# Patient Record
Sex: FEMALE | Race: BLACK OR AFRICAN AMERICAN | NOT HISPANIC OR LATINO
[De-identification: names, ages, dates, MRNs, and addresses within clinical notes are randomized per-mention and may not be internally consistent; named-entity substitution may affect disease eponyms.]

---

## 2017-09-12 ENCOUNTER — RESULT REVIEW (OUTPATIENT)
Age: 57
End: 2017-09-12

## 2018-11-18 ENCOUNTER — INPATIENT (INPATIENT)
Facility: HOSPITAL | Age: 58
LOS: 3 days | Discharge: HOME | End: 2018-11-22
Attending: INTERNAL MEDICINE | Admitting: INTERNAL MEDICINE
Payer: COMMERCIAL

## 2018-11-18 VITALS
RESPIRATION RATE: 16 BRPM | DIASTOLIC BLOOD PRESSURE: 80 MMHG | HEART RATE: 70 BPM | OXYGEN SATURATION: 96 % | SYSTOLIC BLOOD PRESSURE: 154 MMHG | TEMPERATURE: 99 F

## 2018-11-18 DIAGNOSIS — I63.9 CEREBRAL INFARCTION, UNSPECIFIED: ICD-10-CM

## 2018-11-18 DIAGNOSIS — R63.8 OTHER SYMPTOMS AND SIGNS CONCERNING FOOD AND FLUID INTAKE: ICD-10-CM

## 2018-11-18 DIAGNOSIS — I10 ESSENTIAL (PRIMARY) HYPERTENSION: ICD-10-CM

## 2018-11-18 DIAGNOSIS — Z29.9 ENCOUNTER FOR PROPHYLACTIC MEASURES, UNSPECIFIED: ICD-10-CM

## 2018-11-18 DIAGNOSIS — I50.9 HEART FAILURE, UNSPECIFIED: ICD-10-CM

## 2018-11-18 DIAGNOSIS — R07.9 CHEST PAIN, UNSPECIFIED: ICD-10-CM

## 2018-11-18 DIAGNOSIS — J45.909 UNSPECIFIED ASTHMA, UNCOMPLICATED: ICD-10-CM

## 2018-11-18 LAB
ALBUMIN SERPL ELPH-MCNC: 4.1 G/DL — SIGNIFICANT CHANGE UP (ref 3.3–5)
ALP SERPL-CCNC: 84 U/L — SIGNIFICANT CHANGE UP (ref 40–120)
ALT FLD-CCNC: 15 U/L — SIGNIFICANT CHANGE UP (ref 4–33)
APTT BLD: 32.5 SEC — SIGNIFICANT CHANGE UP (ref 27.5–36.3)
AST SERPL-CCNC: 23 U/L — SIGNIFICANT CHANGE UP (ref 4–32)
BASOPHILS # BLD AUTO: 0.09 K/UL — SIGNIFICANT CHANGE UP (ref 0–0.2)
BASOPHILS NFR BLD AUTO: 1 % — SIGNIFICANT CHANGE UP (ref 0–2)
BILIRUB SERPL-MCNC: 0.6 MG/DL — SIGNIFICANT CHANGE UP (ref 0.2–1.2)
BUN SERPL-MCNC: 16 MG/DL — SIGNIFICANT CHANGE UP (ref 7–23)
CALCIUM SERPL-MCNC: 9.4 MG/DL — SIGNIFICANT CHANGE UP (ref 8.4–10.5)
CHLORIDE SERPL-SCNC: 105 MMOL/L — SIGNIFICANT CHANGE UP (ref 98–107)
CO2 SERPL-SCNC: 25 MMOL/L — SIGNIFICANT CHANGE UP (ref 22–31)
CREAT SERPL-MCNC: 0.99 MG/DL — SIGNIFICANT CHANGE UP (ref 0.5–1.3)
EOSINOPHIL # BLD AUTO: 0.15 K/UL — SIGNIFICANT CHANGE UP (ref 0–0.5)
EOSINOPHIL NFR BLD AUTO: 1.7 % — SIGNIFICANT CHANGE UP (ref 0–6)
GLUCOSE SERPL-MCNC: 106 MG/DL — HIGH (ref 70–99)
HCT VFR BLD CALC: 39.8 % — SIGNIFICANT CHANGE UP (ref 34.5–45)
HGB BLD-MCNC: 12.7 G/DL — SIGNIFICANT CHANGE UP (ref 11.5–15.5)
IMM GRANULOCYTES # BLD AUTO: 0.03 # — SIGNIFICANT CHANGE UP
IMM GRANULOCYTES NFR BLD AUTO: 0.3 % — SIGNIFICANT CHANGE UP (ref 0–1.5)
INR BLD: 1.11 — SIGNIFICANT CHANGE UP (ref 0.88–1.17)
LYMPHOCYTES # BLD AUTO: 3.83 K/UL — HIGH (ref 1–3.3)
LYMPHOCYTES # BLD AUTO: 44.4 % — HIGH (ref 13–44)
MCHC RBC-ENTMCNC: 28 PG — SIGNIFICANT CHANGE UP (ref 27–34)
MCHC RBC-ENTMCNC: 31.9 % — LOW (ref 32–36)
MCV RBC AUTO: 87.7 FL — SIGNIFICANT CHANGE UP (ref 80–100)
MONOCYTES # BLD AUTO: 0.66 K/UL — SIGNIFICANT CHANGE UP (ref 0–0.9)
MONOCYTES NFR BLD AUTO: 7.7 % — SIGNIFICANT CHANGE UP (ref 2–14)
NEUTROPHILS # BLD AUTO: 3.86 K/UL — SIGNIFICANT CHANGE UP (ref 1.8–7.4)
NEUTROPHILS NFR BLD AUTO: 44.9 % — SIGNIFICANT CHANGE UP (ref 43–77)
NRBC # FLD: 0 — SIGNIFICANT CHANGE UP
PLATELET # BLD AUTO: 291 K/UL — SIGNIFICANT CHANGE UP (ref 150–400)
PMV BLD: 9.3 FL — SIGNIFICANT CHANGE UP (ref 7–13)
POTASSIUM SERPL-MCNC: 4 MMOL/L — SIGNIFICANT CHANGE UP (ref 3.5–5.3)
POTASSIUM SERPL-SCNC: 4 MMOL/L — SIGNIFICANT CHANGE UP (ref 3.5–5.3)
PROT SERPL-MCNC: 7.3 G/DL — SIGNIFICANT CHANGE UP (ref 6–8.3)
PROTHROM AB SERPL-ACNC: 12.4 SEC — SIGNIFICANT CHANGE UP (ref 9.8–13.1)
RBC # BLD: 4.54 M/UL — SIGNIFICANT CHANGE UP (ref 3.8–5.2)
RBC # FLD: 13.2 % — SIGNIFICANT CHANGE UP (ref 10.3–14.5)
SODIUM SERPL-SCNC: 141 MMOL/L — SIGNIFICANT CHANGE UP (ref 135–145)
TROPONIN T, HIGH SENSITIVITY: < 6 NG/L — SIGNIFICANT CHANGE UP (ref ?–14)
WBC # BLD: 8.62 K/UL — SIGNIFICANT CHANGE UP (ref 3.8–10.5)
WBC # FLD AUTO: 8.62 K/UL — SIGNIFICANT CHANGE UP (ref 3.8–10.5)

## 2018-11-18 PROCEDURE — 99223 1ST HOSP IP/OBS HIGH 75: CPT | Mod: GC

## 2018-11-18 PROCEDURE — 70450 CT HEAD/BRAIN W/O DYE: CPT | Mod: 26

## 2018-11-18 PROCEDURE — 71046 X-RAY EXAM CHEST 2 VIEWS: CPT | Mod: 26

## 2018-11-18 RX ORDER — ATORVASTATIN CALCIUM 80 MG/1
80 TABLET, FILM COATED ORAL AT BEDTIME
Qty: 0 | Refills: 0 | Status: DISCONTINUED | OUTPATIENT
Start: 2018-11-18 | End: 2018-11-22

## 2018-11-18 RX ORDER — ENOXAPARIN SODIUM 100 MG/ML
40 INJECTION SUBCUTANEOUS DAILY
Qty: 0 | Refills: 0 | Status: DISCONTINUED | OUTPATIENT
Start: 2018-11-18 | End: 2018-11-22

## 2018-11-18 RX ORDER — ASPIRIN/CALCIUM CARB/MAGNESIUM 324 MG
243 TABLET ORAL DAILY
Qty: 0 | Refills: 0 | Status: DISCONTINUED | OUTPATIENT
Start: 2018-11-18 | End: 2018-11-19

## 2018-11-18 RX ADMIN — Medication 243 MILLIGRAM(S): at 20:54

## 2018-11-18 NOTE — ED PROVIDER NOTE - PROGRESS NOTE DETAILS
pt evaluated by neurology, offered pt and sister declined TPA. Will admit to medicine for CVA and further workup. AK: Admit for stroke workup per neuro. Discussed with hospiltalist. Paged Tele PA. Patient agrees with plan.

## 2018-11-18 NOTE — H&P ADULT - NSHPREVIEWOFSYSTEMS_GEN_ALL_CORE
REVIEW OF SYSTEMS:    CONSTITUTIONAL: No weakness, fevers or chills  EYES/ENT: No visual changes;  No vertigo or throat pain   NECK: No pain or stiffness  RESPIRATORY: No cough, wheezing, hemoptysis; No shortness of breath  CARDIOVASCULAR: No chest pain or palpitations  GASTROINTESTINAL: No abdominal or epigastric pain. No nausea, vomiting, or hematemesis; No diarrhea or constipation. No melena or hematochezia.  GENITOURINARY: No dysuria, frequency or hematuria  NEUROLOGICAL: No numbness or weakness  SKIN: No itching, burning, rashes, or lesions   All other review of systems is negative unless indicated above. REVIEW OF SYSTEMS:    CONSTITUTIONAL: No weakness, fevers or chills  EYES/ENT: No visual changes;  No vertigo or throat pain ; + HA  NECK: No pain or stiffness  RESPIRATORY: No cough, wheezing, hemoptysis; No shortness of breath  CARDIOVASCULAR: No chest pain or palpitations  GASTROINTESTINAL: No abdominal or epigastric pain. No nausea, vomiting, or hematemesis; No diarrhea or constipation. No melena or hematochezia.  GENITOURINARY: No dysuria, frequency or hematuria  NEUROLOGICAL: No numbness or weakness; + expressive aphasia  SKIN: No itching, burning, rashes, or lesions   All other review of systems is negative unless indicated above. REVIEW OF SYSTEMS:    CONSTITUTIONAL: No weakness, fevers or chills  EYES/ENT: No visual changes;  No vertigo or throat pain ; + HA  NECK: No pain or stiffness  RESPIRATORY: No cough, wheezing, hemoptysis; No shortness of breath  CARDIOVASCULAR:  (+) chest pain, no palpitations  GASTROINTESTINAL: No abdominal or epigastric pain. No nausea, vomiting, or hematemesis; No diarrhea or constipation. No melena or hematochezia.  GENITOURINARY: No dysuria, frequency or hematuria  NEUROLOGICAL: No numbness or weakness; stumbling over her words  SKIN: No itching, burning, rashes, or lesions   All other review of systems is negative unless indicated above.

## 2018-11-18 NOTE — ED ADULT TRIAGE NOTE - CHIEF COMPLAINT QUOTE
Pt. c/o headaches, dizziness and CP starting today. CP lasting approx. 5 min. Also states she was stumbling over her words a few times. All symptoms now resolved. PMhx: ELISA, MI

## 2018-11-18 NOTE — H&P ADULT - ASSESSMENT
58yo F hx of MI, CHF, recent pericardial effusion(observation only, f/u every 6months), known LBBB p/w CP and lightheadedness, found to have Broca's aphasia 2/2 CVA 56yo Female hx of MI, CHF, Asthma, recent pericardial effusion (observation only, f/u every 6months), known LBBB a/w CP and lightheadedness, found to have new Broca's aphasia, CVA;

## 2018-11-18 NOTE — CONSULT NOTE ADULT - SUBJECTIVE AND OBJECTIVE BOX
Neurology Consult    Reason for consult: Aphasia    HPI:  58yo F hx of MI, CHF, recent pericardial effusion(observation only, f/u 6months), known LBBB presenting with chest pain and lightheadedness starting at 12:30pm on day of admission. Neurology consulted for speech difficulty. LKW 1600. Patient was face timing with sister when her sister noticed her speech was broken. Her other sister visited her and decided to call EMS. Stroke code called, patient with loss of speech fluency and repetition deficits, intact understanding. Denies numbness/weakness/headache/dizziness.     REVIEW OF SYSTEMS:  As above    MEDICATIONS    PMH: Asthma    PSH: No significant past surgical history    FAMILY HISTORY:  No history of dementia, strokes, or seizures     SOCIAL HISTORY:  No history of tobacco or alcohol use     Allergies  No Known Allergies    Intolerances    Vital Signs Last 24 Hrs  T(C): 37.3 (18 Nov 2018 18:06), Max: 37.3 (18 Nov 2018 18:06)  T(F): 99.2 (18 Nov 2018 18:06), Max: 99.2 (18 Nov 2018 18:06)  HR: 74 (18 Nov 2018 19:51) (70 - 74)  BP: 152/66 (18 Nov 2018 19:51) (152/66 - 154/80)  BP(mean): --  RR: 16 (18 Nov 2018 19:51) (16 - 16)  SpO2: 99% (18 Nov 2018 19:51) (96% - 99%)    Neurological Examination:    Mental Status: Patient is alert, awake, oriented X3. Intact understanding, loss of fluency, loss of repetition. Follows commands well and able to answer questions appropriately. Mood and affect normal.    Cranial Nerves: PERRL, EOMI, visual field intact, V1-V3 intact, no gross facial asymmetry, tongue/uvula midline    Motor Exam: No drift  Right upper extremity: 5/5  Left upper extremity: 5/5  Right lower extremity: 5/5  Left lower extremity: 5/5    Normal bulk/tone    Sensory: Intact to light touch and pinprick bilaterally. No extinction    Coordination: Finger to nose intact bilaterally     Gait: Normal      Reflexes: Bilateral 2+ Biceps, Brachial, Patellar, Ankle  Plantar: Down bilateral    GENERAL: No acute distress  HEENT:  Normocephalic, atraumatic  EXTREMITIES: No edema, clubbing, cyanosis  MUSCULOSKELETAL: Normal range of motion  SKIN: No rashes    LABS:  CBC Full  -  ( 18 Nov 2018 19:17 )  WBC Count : 8.62 K/uL  Hemoglobin : 12.7 g/dL  Hematocrit : 39.8 %  Platelet Count - Automated : 291 K/uL

## 2018-11-18 NOTE — ED PROVIDER NOTE - ATTENDING CONTRIBUTION TO CARE
Attending Statement: I have personally seen and examined this patient. I have fully participated in the care of this patient. I have reviewed all pertinent clinical information, including history physical exam, plan and the Resident's note and agree except as noted  57yoF hx of CAD, HTN, asthma, CHF, pw chest pain and difficulty speaking/finding words. States that earlier today she went to [employer] had 5 minute non exertional, non radiating midsternal chest pain. No associated sob/nausea or diaphoresis. Was alone, facetime sister at approximately 415pm-430pm noted that she was slurring her words and was having difficulty speaking. Her sister then contacted her family and they came to the hospital. Pt endorsing difficulty  "getting words out and speaking" Sister states "speech is off, slurred off.  No focal weakness no numbness, "little headache" no change in vision. no chest pain now. no sob. no trauma.   Vital signs noted. tearful, upset, anxious with sister at bedside. EOMI. PERRLA. no facial asymmetry. normal S1-S2 No resp distress. able to speak in full and clear sentences. no wheeze, rales or stridor. soft nontender abdomen. no  rebound. no guarding. no sign of trauma. no CVAT AOx3, no focal deficits. CN 2-12 grossly intact. nl sensation face/arms . no meningeal signs.   plan code stroke called, ekg, cxr, tele monitoring, neurology and admit

## 2018-11-18 NOTE — H&P ADULT - PROBLEM/PLAN-7
[phone] (home)       Called Josue to set her up for a [employer] account. Left message for pt to return call.       Franny Jara      Patient Care Team:  Felipe Mcfarland MD as PCP - General (Family Medicine)  Felipe Mcfarland MD (Family Medicine)
DISPLAY PLAN FREE TEXT

## 2018-11-18 NOTE — H&P ADULT - HISTORY OF PRESENT ILLNESS
58yo F hx of MI, CHF, recent pericardial effusion(observation only, f/u every 6months), known LBBB presenting with chest pain and lightheadedness starting at 12:30pm on day of admission.     In the ED, Neurology was consulted for sudden change in speech. LKW 1600. Patient was face timing with sister when her sister noticed her speech was broken. Her other sister visited her and decided to call EMS.     In the ED, Code Stroke code, patient with loss of speech fluency and repetition deficits, yet retained understanding.     Denies numbness/weakness/headache/dizziness.     In the ED:  Vital Signs Last 24 Hrs  T(C): 37.3 (11-18-18 @ 18:06), Max: 37.3 (11-18-18 @ 18:06)  T(F): 99.2 (11-18-18 @ 18:06), Max: 99.2 (11-18-18 @ 18:06)  HR: 87 (11-18-18 @ 20:54) (70 - 87)  BP: 135/75 (11-18-18 @ 20:54) (135/75 - 154/80)  RR: 16 (11-18-18 @ 20:54) (16 - 16)  SpO2: 100% (11-18-18 @ 20:54) (96% - 100%)    Received: Aspirin 234 58yo F hx of MI, CHF, recent pericardial effusion(observation only, f/u every 6months), known LBBB presenting with chest pain and lightheadedness starting at 12:30pm on day of admission. During discussion with pt, no complaints of CP/SOB; pt also denying recent weight gain, LE edema, or other cardiac-related sx.    Per pt, stated that she was in her [place] when she began to have some chest tightness around 12:30, for which she took a baby aspirin and felt belt.  She was in [employer] today and when she came home, she had a headache around 4 PM, which she also had during the interview; HA is in L occipito-parietal region, 6-7/10 w/o radiation, mild light sensitivity but denies n/v/phonophobia; pt does not have a hx of HA.  Pt was then face-timing with her sister when she began having difficulty getting words out; her sister was very concerned given her cardiac hx and suggested she come into the ED.      In the ED, Neurology was consulted for sudden change in speech. Last known well 1600. . In the ED, Code Stroke code, patient with loss of speech fluency and repetition deficits, yet retained understanding. Pt refused tPA after risk/benefit discussion.      Per discussion with pt, she has no toxic habits and only drinks socially; no cigarettes ever smoked or any hx of illicit drugs.  Pt denies any pertinent family hx of diabetes, stroke, or cardiac disease.  Pt is quite socially active attending concerts, plays and other social functions and works full-time as a manager in a senior citizens facility.      Denies numbness/weakness/headache/dizziness; no complaints of f/c/n/v/CP/SOB/LOC/abd pain/dysuria.      In the ED:  Vital Signs Last 24 Hrs  T(C): 37.3 (11-18-18 @ 18:06), Max: 37.3 (11-18-18 @ 18:06)  T(F): 99.2 (11-18-18 @ 18:06), Max: 99.2 (11-18-18 @ 18:06)  HR: 87 (11-18-18 @ 20:54) (70 - 87)  BP: 135/75 (11-18-18 @ 20:54) (135/75 - 154/80)  RR: 16 (11-18-18 @ 20:54) (16 - 16)  SpO2: 100% (11-18-18 @ 20:54) (96% - 100%)    Received: Aspirin 243 56yo F hx of MI, CHF, recent pericardial effusion(observation only, f/u every 6months), known LBBB presenting with chest pain and lightheadedness starting at 12:30pm on day of admission who later developed expressive aphasia. During discussion with pt, no complaints of CP/SOB; pt also denying recent weight gain, LE edema, or other cardiac-related sx.    Per pt, stated that she was in her [place] when she began to have some chest tightness around 12:30, for which she took a baby aspirin and felt belt.  She was in [employer] today and when she came home, she had a headache around 4 PM, which she also had during the interview; HA is in L occipito-parietal region, 6-7/10 w/o radiation, mild light sensitivity but denies n/v/phonophobia; pt does not have a hx of HA.  Pt was then face-timing with her sister when she began having difficulty getting words out; her sister was very concerned given her cardiac hx and suggested she come into the ED.      In the ED, Neurology was consulted for sudden change in speech. Last known well 1600. . In the ED, Code Stroke code, patient with loss of speech fluency and repetition deficits, yet retained understanding. Pt refused tPA after risk/benefit discussion.      Per discussion with pt, she has no toxic habits and only drinks socially; no cigarettes ever smoked or any hx of illicit drugs.  Pt denies any pertinent family hx of diabetes, stroke, or cardiac disease.  Pt is quite socially active attending concerts, plays and other social functions and works full-time as a manager in a senior citizens facility.      Denies numbness/weakness/headache/dizziness; no complaints of f/c/n/v/CP/SOB/LOC/abd pain/dysuria.      In the ED:  Vital Signs Last 24 Hrs  T(C): 37.3 (11-18-18 @ 18:06), Max: 37.3 (11-18-18 @ 18:06)  T(F): 99.2 (11-18-18 @ 18:06), Max: 99.2 (11-18-18 @ 18:06)  HR: 87 (11-18-18 @ 20:54) (70 - 87)  BP: 135/75 (11-18-18 @ 20:54) (135/75 - 154/80)  RR: 16 (11-18-18 @ 20:54) (16 - 16)  SpO2: 100% (11-18-18 @ 20:54) (96% - 100%)    Received: Aspirin 243 56yo Female hx of MI, CHF, Asthma, recent pericardial effusion (observation only, f/u every 6months), known LBBB presenting with chest pain and lightheadedness starting at 12:30pm on day of admission who later developed expressive aphasia. During discussion with pt, no complaints of CP/SOB; pt also denying recent weight gain, LE edema, or other cardiac-related sx. Per pt, stated that she was in her [employer] when she began to have some chest tightness around 12:30, for which she took a baby aspirin and felt belt.  She was in [employer] today and when she came home, she had a headache around 4 PM, which she also had during the interview; HA is in L occipito-parietal region, 6-7/10 w/o radiation, mild light sensitivity but denies n/v/phonophobia; pt does not have a hx of HA.  Pt was then face-timing with her sister when she began having difficulty getting words out; her sister was very concerned given her cardiac hx and suggested she come into the ED.  In the ED, Neurology was consulted for sudden change in speech. Last known well 1600. . In the ED, Code Stroke code, patient with loss of speech fluency and repetition deficits, yet retained understanding. Pt refused tPA after risk/benefit discussion.    Per discussion with pt, she has no toxic habits and only drinks socially; no cigarettes ever smoked or any hx of illicit drugs. Pt denies any pertinent family hx of diabetes, stroke, or cardiac disease or CAD.  Pt is quite socially active attending concerts, plays and other social functions and works full-time as a manager in a senior citizens facility.  Denies numbness/weakness/headache/dizziness; no complaints of f/c/n/v/CP/SOB/LOC/abd pain/dysuria.  Of note, sates that takes Aspirin occasionally only.      In the ED:  Vital Signs Last 24 Hrs  T(C): 37.3 (11-18-18 @ 18:06), Max: 37.3 (11-18-18 @ 18:06)  T(F): 99.2 (11-18-18 @ 18:06), Max: 99.2 (11-18-18 @ 18:06)  HR: 87 (11-18-18 @ 20:54) (70 - 87)  BP: 135/75 (11-18-18 @ 20:54) (135/75 - 154/80)  RR: 16 (11-18-18 @ 20:54) (16 - 16)  SpO2: 100% (11-18-18 @ 20:54) (96% - 100%)    Received: Aspirin 243mg x 1 dose

## 2018-11-18 NOTE — H&P ADULT - NSHPPHYSICALEXAM_GEN_ALL_CORE
Vital Signs Last 24 Hrs  T(C): 37.3 (11-18-18 @ 18:06), Max: 37.3 (11-18-18 @ 18:06)  T(F): 99.2 (11-18-18 @ 18:06), Max: 99.2 (11-18-18 @ 18:06)  HR: 87 (11-18-18 @ 20:54) (70 - 87)  BP: 135/75 (11-18-18 @ 20:54) (135/75 - 154/80)  BP(mean): --  RR: 16 (11-18-18 @ 20:54) (16 - 16)  SpO2: 100% (11-18-18 @ 20:54) (96% - 100%)    PHYSICAL EXAM:  GENERAL: NAD, well-groomed, well-developed  HEAD:  Atraumatic, Normocephalic  EYES: EOMI, PERRLA, conjunctiva and sclera clear  ENMT: No tonsillar erythema, exudates, or enlargement; Moist mucous membranes, Good dentition, No lesions  NECK: Supple, No JVD, Normal thyroid  CHEST/LUNG: Clear to percussion bilaterally; No rales, rhonchi, wheezing, or rubs  HEART: Regular rate and rhythm; No murmurs, rubs, or gallops  ABDOMEN: Soft, Nontender, Nondistended; Bowel sounds present  EXTREMITIES:  2+ Peripheral Pulses, No clubbing, cyanosis, or edema  LYMPH: No lymphadenopathy noted  SKIN: No rashes or lesions  NERVOUS SYSTEM:  Alert & Oriented X3, Good concentration; Motor Strength 5/5 B/L upper and lower extremities; DTRs 2+ intact and symmetric Vital Signs Last 24 Hrs  T(C): 37.3 (11-18-18 @ 18:06), Max: 37.3 (11-18-18 @ 18:06)  T(F): 99.2 (11-18-18 @ 18:06), Max: 99.2 (11-18-18 @ 18:06)  HR: 87 (11-18-18 @ 20:54) (70 - 87)  BP: 135/75 (11-18-18 @ 20:54) (135/75 - 154/80)  RR: 16 (11-18-18 @ 20:54) (16 - 16)  SpO2: 100% (11-18-18 @ 20:54) (96% - 100%)    PHYSICAL EXAM:  GENERAL: NAD, conversant, pleasant  HEAD:  Atraumatic, Normocephalic  EYES: EOMI, PERRLA, conjunctiva and sclera clear  ENMT: mmm, tongue midline w/o fasciculation  NECK: Supple, No JVD, Normal thyroid  CHEST/LUNG: Clear to percussion bilaterally; No rales, rhonchi, wheezing, or rubs  HEART: Regular rate and rhythm; No murmurs, rubs, or gallops  ABDOMEN: Soft, Nontender, Nondistended; Bowel sounds present  EXTREMITIES:  2+ Peripheral Pulses, No clubbing, cyanosis, or edema  LYMPH: No lymphadenopathy noted  SKIN: No rashes or lesions  NERVOUS SYSTEM:  Alert & Oriented X3, Good concentration; Motor Strength 5/5 B/L upper and lower extremities; DTRs 2+ intact and symmetric; no dysmetria, sensation in tact throughout , expressive aphasia Vital Signs Last 24 Hrs  T(C): 37.3 (11-18-18 @ 18:06), Max: 37.3 (11-18-18 @ 18:06)  T(F): 99.2 (11-18-18 @ 18:06), Max: 99.2 (11-18-18 @ 18:06)  HR: 87 (11-18-18 @ 20:54) (70 - 87)  BP: 135/75 (11-18-18 @ 20:54) (135/75 - 154/80)  RR: 16 (11-18-18 @ 20:54) (16 - 16)  SpO2: 100% (11-18-18 @ 20:54) (96% - 100%)    PHYSICAL EXAM:  GENERAL: NAD, conversant, pleasant  HEAD:  Atraumatic, Normocephalic  EYES: EOMI, PERRLA, conjunctiva and sclera clear  ENMT: mmm, tongue midline w/o fasciculation  NECK: Supple, No JVD, Normal thyroid  CHEST/LUNG: Clear to percussion bilaterally; No rales, rhonchi, wheezing, or rubs  HEART: Regular rate and rhythm; No murmurs, rubs, or gallops  ABDOMEN: Soft, Nontender, Nondistended; Bowel sounds present  EXTREMITIES:  2+ Peripheral Pulses, No clubbing, cyanosis, or edema  LYMPH: No lymphadenopathy noted  SKIN: No rashes or lesions  NERVOUS SYSTEM:  Alert & Oriented X3, Good concentration; Motor Strength 5/5 B/L upper and lower extremities; DTRs 2+ intact and symmetric; no dysmetria, sensation in tact throughout , periodic expressive aphasia, cognition and comprehension in tact Vital Signs Last 24 Hrs  T(C): 37.3 (11-18-18 @ 18:06), Max: 37.3 (11-18-18 @ 18:06)  T(F): 99.2 (11-18-18 @ 18:06), Max: 99.2 (11-18-18 @ 18:06)  HR: 87 (11-18-18 @ 20:54) (70 - 87)  BP: 135/75 (11-18-18 @ 20:54) (135/75 - 154/80)  RR: 16 (11-18-18 @ 20:54) (16 - 16)  SpO2: 100% (11-18-18 @ 20:54) (96% - 100%)    PHYSICAL EXAM:  GENERAL: NAD, conversant, pleasant  EYES: EOMI, PERRLA, conjunctiva and sclera clear  ENMT: mmm, tongue midline w/o fasciculation  NECK: Supple, No JVD, Normal thyroid  CHEST/LUNG: Clear to percussion bilaterally; No rales, rhonchi, wheezing, or rubs  HEART: Regular rate and rhythm; No murmurs, rubs, or gallops  ABDOMEN: Soft, Nontender, Nondistended; Bowel sounds present  EXTREMITIES:  2+ Peripheral Pulses, No clubbing, cyanosis, or edema  LYMPH: No lymphadenopathy noted  SKIN: No rashes or lesions  NERVOUS SYSTEM:  Alert & Oriented X3, Good concentration; Motor Strength 5/5 B/L upper and lower extremities; DTRs 2+ intact and symmetric; no dysmetria, sensation in tact throughout , notable expressive aphasia, cognition and comprehension in tact

## 2018-11-18 NOTE — H&P ADULT - PROBLEM SELECTOR PLAN 4
- DVT:  - Dispo: tele  - Consult: neurology, PT/OT/PMR, speech/swallow  - Code: full Pt does not endorse a hx of HTN but pressures are elevated  - monitor BPs; permissive HTN w/ SBP up to 220  - will give IV Labetalol if pressure approaches 200  - q4 VS + neurocheck

## 2018-11-18 NOTE — H&P ADULT - PROBLEM SELECTOR PLAN 1
Pt has significant cardiac hx w/ MI, CHF, and LBBB w/ pericardial effusion that is followed  - f/u MRI brain  - f/u MRA head neck  - f/u carotid duplex  - f/u TTE w/ Bubble study  - monitor on telemetry  - PT/OT/PMR c/s; speech and swallow evaluation and speech language eval given Broca's aphasia  - Aspirin 234   - Atorvastatin 80  - f/u A1c and lipid profile in the AM Pt has expressive aphasia likely 2/2 ischemic stroke as no hemorrhage on CTH; Pt has significant cardiac hx w/ MI, CHF, and LBBB w/ pericardial effusion that is followed; no family hx of disease and no irregular HR on exam  - f/u MRI brain  - f/u MRA head neck  - f/u carotid duplex  - f/u TTE w/ Bubble study  - permissive HTN for 24 hours w/ SBP up to 220  - f/u neuro recs  - vitals q4 + q4 neurochecks  - monitor on telemetry  - PT/OT/PMR c/s; speech and swallow evaluation and speech language eval given Broca's aphasia  - c/w ASA 81   - Atorvastatin 80  - f/u A1c and lipid profile in the AM

## 2018-11-18 NOTE — H&P ADULT - PROBLEM SELECTOR PLAN 7
- DVT:  - Dispo: tele  - Consult: neurology, PT/OT/PMR, speech/swallow  - Code: full - DVT: Lovenox  - Dispo: tele  - Consult: neurology, PT/OT/PMR, speech/swallow  - Code: full

## 2018-11-18 NOTE — H&P ADULT - NSHPSOCIALHISTORY_GEN_ALL_CORE
Social History:    Marital Status:  (   )    (   ) Single    (   )    (  )   Occupation:   Lives with: (  ) alone  (  ) children   (  ) spouse   (  ) parents  (  ) other    Substance Use (street drugs): (  ) never used  (  ) other:  Tobacco Usage:  (   ) never smoked   (   ) former smoker   (   ) current smoker  (     ) pack year  (        ) last cigarette date  Alcohol Usage:  Sexual History: Occupation: manager  Substance Use (street drugs): ( x ) never used  (  ) other:  Tobacco Usage:  ( x  ) never smoked   (   ) former smoker   (   ) current smoker  (     ) pack year  (        ) last cigarette date  Alcohol Usage: socially Occupation:   Substance Use (street drugs): ( x ) never used  (  ) other:  Tobacco Usage:  ( x  ) never smoked   (   ) former smoker   (   ) current smoker  (     ) pack year  (        ) last cigarette date  Alcohol Usage: socially

## 2018-11-18 NOTE — ED PROVIDER NOTE - OBJECTIVE STATEMENT
58yo F hx of MI, CHF, recent pericardial effusion(observation only, f/u 6months), known LBBB presenting 58yo F hx of MI, CHF, recent pericardial effusion(observation only, f/u 6months), known LBBB presenting with chest pain and lightheadedness starting at 12:30pm today. Chest pain lasted 5 minutes, resolved, no pain since. Lightheadedness slowly improving over the course of the day. At 4pm/4:30pm patient was face-timing her sister and she was told she was slurring her words and repeating some words. Her other sister came over, they called EMS and EMS also noted word slurring and repetition. Denies any numbness/weakness.   She took 81mg asa earlier today.

## 2018-11-18 NOTE — H&P ADULT - PROBLEM SELECTOR PLAN 2
- monitor O2 sats  - can give PRN Duoneb as needed Pt earlier complained of chest tightness and lightheadedness, which resolved on exam; no relation to exertion, no SLG NTG used; does not meet criteria for typical or atypical CP  - monitor on telemetry  - ASA 81   - f/u TTE w/ Bubble for EF and structural abnormalities  - no need for urgent cardiology c/s at this time Pt is high risk, earlier complained of chest tightness and lightheadedness, which resolved on exam; no relation to exertion, no SLG NTG used; does not meet criteria for typical  CP;  - monitor on telemetry  - ASA 81   - f/u TTE w/ Bubble for EF and structural abnormalities  - Cardiology c/s in am

## 2018-11-18 NOTE — ED PROVIDER NOTE - MEDICAL DECISION MAKING DETAILS
58yo F hx of MI, CHF, recent pericardial effusion(observation only, f/u 6months), known LBBB presenting with chest pain and lightheadedness starting at 12:30pm today and noted by family member to have slurred speech at 4 or 4:30pm. ACS workup. Code stroke called after patient noted word slurring. Neuro in the room.

## 2018-11-18 NOTE — H&P ADULT - NSHPLABSRESULTS_GEN_ALL_CORE
11-18    141  |  105  |  16  ----------------------------<  106<H>  4.0   |  25  |  0.99    Ca    9.4      18 Nov 2018 19:17    TPro  7.3  /  Alb  4.1  /  TBili  0.6  /  DBili  x   /  AST  23  /  ALT  15  /  AlkPhos  84  11-18      PT/INR - ( 18 Nov 2018 19:17 )   PT: 12.4 SEC;   INR: 1.11          PTT - ( 18 Nov 2018 19:17 )  PTT:32.5 SEC                                        12.7   8.62  )-----------( 291      ( 18 Nov 2018 19:17 )             39.8     CAPILLARY BLOOD GLUCOSE EKG, 11/18, 71bpm, qtc 423, LBBB, no acute Tw or ST changes - my reading         CT BRAIN STROKE PROTOCOL   PROCEDURE DATE: Nov 18 2018   FINDINGS:   There is no acute intracranial hemorrhage, mass effect, midline shift,   extra-axial collection, hydrocephalus, or evidence of acute vascular   territorial infarction.   The visualized paranasal sinuses and mastoid air cells are clear. Visualized   osseous structures are intact.   IMPRESSION:   No acute intracranial hemorrhage, mass effect, or evidence of acute vascular   territorial infarction.    CXR - clear lungs, no pleural effusions - my reading

## 2018-11-18 NOTE — H&P ADULT - PROBLEM SELECTOR PLAN 6
- NPO until formal speech/swallow evaluation  - will give very gentle IV hydration @ 50 cc/hr w/ D5 since pt is NPO except meds; pt should be on at least a DASH-TLC diet;

## 2018-11-18 NOTE — H&P ADULT - PROBLEM SELECTOR PLAN 3
- NPO until formal speech/swallow evaluation Unclear if systolic or diastolic; likely diastolic  - daily weight, strict I/O  - will hold home lisinopril and metoprolol succinate at this time Unclear if systolic or diastolic; likely diastolic  - daily weight, strict I/O  - will hold home lisinopril and metoprolol succinate at this time  - f/u TTE

## 2018-11-18 NOTE — CONSULT NOTE ADULT - ATTENDING COMMENTS
I have seen and examined this patient with the stroke neurology team.     History was reviewed with the patient and/or available family members.   ROS: All negative except documented in the HPI.   Neurological exam was performed and agree with exam as documented above.   Laboratory results and imaging studies were reviewed by me.   I agree with the neurology resident note as documented above.    57 years old woman with multiple vascular risk factors including age, CAD and CHF is evaluated at [facility] for language disturbance described as expressive aphasia, noticed on 11/18; prompting her presentation to the hospital. Today she reports complete resolution of her neurological symptoms and reports being back to normal. Neurological examination today is grossly nonfocal. MRI brain did not show any evidence of acute infarct. MRA head is grossly unremarkable. MRA neck was severely limited by motion artifact.    Impression:  Cerebral embolism without cerebral infarction. Transient ischemic attack involving left MCA distribution presenting as transient left hemispheric dysfunction - likely etiology being embolism from a proximal source like cardiac/paradoxical source of embolism, but possibility of large vessel disease (extracranial) needs to be ruled out     Plan:  - Aspirin and clopidogrel for aggressive secondary stroke prevention for 3 weeks followed by aspirin 81 mg once a day (clopidogrel to be started once able to pass swallowing evaluation.)  - Agree to continue with pharmacological DVT prophylaxis   - Continue with home statin medication if applicable considering likely non-atheroembolic etiology of her TIA or to be considered based on medical indications/ASCVD risk profile  - HbA1C 5.4 and LDL 57, continue with aggressive vascular risk factors modifications   - Permissive HTN up to 220/110 mmHg for first 48-72 hours from symptom onset followed by gradual normotension  - CTA head and neck to better characterize extracranial cerebral vasculature and MRA was severely limited by motion artifact  - MARIVEL with bubble study and continue with telemetry to screen for possible cardiac source of embolism  - Prolonged cardiac monitoring with ICM to screen for occult cardiac arrhythmia like atrial fibrillation being the cause of possible cardiac source of embolism to be considered based on results of above mentioned cardiac tests    - PT/OT/Speech and swallow/safety eval  - Stroke education    Above mentioned plan was discussed with patient and available family members at bedside in detail. All the questions were answered and concerns were addressed.

## 2018-11-18 NOTE — H&P ADULT - PMH
Asthma Asthma    CHF (congestive heart failure)    LBBB (left bundle branch block)    Myocardial infarction, unspecified MI type, unspecified artery

## 2018-11-19 DIAGNOSIS — I50.9 HEART FAILURE, UNSPECIFIED: ICD-10-CM

## 2018-11-19 DIAGNOSIS — J45.40 MODERATE PERSISTENT ASTHMA, UNCOMPLICATED: ICD-10-CM

## 2018-11-19 DIAGNOSIS — I63.9 CEREBRAL INFARCTION, UNSPECIFIED: ICD-10-CM

## 2018-11-19 LAB
ALBUMIN SERPL ELPH-MCNC: 3.7 G/DL — SIGNIFICANT CHANGE UP (ref 3.3–5)
ALP SERPL-CCNC: 74 U/L — SIGNIFICANT CHANGE UP (ref 40–120)
ALT FLD-CCNC: 13 U/L — SIGNIFICANT CHANGE UP (ref 4–33)
APTT BLD: 28.8 SEC — SIGNIFICANT CHANGE UP (ref 27.5–36.3)
AST SERPL-CCNC: 20 U/L — SIGNIFICANT CHANGE UP (ref 4–32)
BASOPHILS # BLD AUTO: 0.08 K/UL — SIGNIFICANT CHANGE UP (ref 0–0.2)
BASOPHILS NFR BLD AUTO: 1.3 % — SIGNIFICANT CHANGE UP (ref 0–2)
BILIRUB SERPL-MCNC: 0.8 MG/DL — SIGNIFICANT CHANGE UP (ref 0.2–1.2)
BUN SERPL-MCNC: 10 MG/DL — SIGNIFICANT CHANGE UP (ref 7–23)
CALCIUM SERPL-MCNC: 8.8 MG/DL — SIGNIFICANT CHANGE UP (ref 8.4–10.5)
CHLORIDE SERPL-SCNC: 107 MMOL/L — SIGNIFICANT CHANGE UP (ref 98–107)
CHOLEST SERPL-MCNC: 133 MG/DL — SIGNIFICANT CHANGE UP (ref 120–199)
CK SERPL-CCNC: 223 U/L — HIGH (ref 25–170)
CK SERPL-CCNC: 247 U/L — HIGH (ref 25–170)
CO2 SERPL-SCNC: 21 MMOL/L — LOW (ref 22–31)
CREAT SERPL-MCNC: 0.76 MG/DL — SIGNIFICANT CHANGE UP (ref 0.5–1.3)
EOSINOPHIL # BLD AUTO: 0.13 K/UL — SIGNIFICANT CHANGE UP (ref 0–0.5)
EOSINOPHIL NFR BLD AUTO: 2.1 % — SIGNIFICANT CHANGE UP (ref 0–6)
GLUCOSE SERPL-MCNC: 113 MG/DL — HIGH (ref 70–99)
HBA1C BLD-MCNC: 5.4 % — SIGNIFICANT CHANGE UP (ref 4–5.6)
HCT VFR BLD CALC: 38 % — SIGNIFICANT CHANGE UP (ref 34.5–45)
HDLC SERPL-MCNC: 75 MG/DL — HIGH (ref 45–65)
HGB BLD-MCNC: 12.5 G/DL — SIGNIFICANT CHANGE UP (ref 11.5–15.5)
IMM GRANULOCYTES # BLD AUTO: 0.01 # — SIGNIFICANT CHANGE UP
IMM GRANULOCYTES NFR BLD AUTO: 0.2 % — SIGNIFICANT CHANGE UP (ref 0–1.5)
INR BLD: 1.12 — SIGNIFICANT CHANGE UP (ref 0.88–1.17)
LIPID PNL WITH DIRECT LDL SERPL: 57 MG/DL — SIGNIFICANT CHANGE UP
LYMPHOCYTES # BLD AUTO: 2.94 K/UL — SIGNIFICANT CHANGE UP (ref 1–3.3)
LYMPHOCYTES # BLD AUTO: 47.3 % — HIGH (ref 13–44)
MAGNESIUM SERPL-MCNC: 2.1 MG/DL — SIGNIFICANT CHANGE UP (ref 1.6–2.6)
MCHC RBC-ENTMCNC: 28.7 PG — SIGNIFICANT CHANGE UP (ref 27–34)
MCHC RBC-ENTMCNC: 32.9 % — SIGNIFICANT CHANGE UP (ref 32–36)
MCV RBC AUTO: 87.4 FL — SIGNIFICANT CHANGE UP (ref 80–100)
MONOCYTES # BLD AUTO: 0.51 K/UL — SIGNIFICANT CHANGE UP (ref 0–0.9)
MONOCYTES NFR BLD AUTO: 8.2 % — SIGNIFICANT CHANGE UP (ref 2–14)
NEUTROPHILS # BLD AUTO: 2.55 K/UL — SIGNIFICANT CHANGE UP (ref 1.8–7.4)
NEUTROPHILS NFR BLD AUTO: 40.9 % — LOW (ref 43–77)
NRBC # FLD: 0 — SIGNIFICANT CHANGE UP
PHOSPHATE SERPL-MCNC: 3.1 MG/DL — SIGNIFICANT CHANGE UP (ref 2.5–4.5)
PLATELET # BLD AUTO: 261 K/UL — SIGNIFICANT CHANGE UP (ref 150–400)
PMV BLD: 9.2 FL — SIGNIFICANT CHANGE UP (ref 7–13)
POTASSIUM SERPL-MCNC: 3.6 MMOL/L — SIGNIFICANT CHANGE UP (ref 3.5–5.3)
POTASSIUM SERPL-SCNC: 3.6 MMOL/L — SIGNIFICANT CHANGE UP (ref 3.5–5.3)
PROT SERPL-MCNC: 6.3 G/DL — SIGNIFICANT CHANGE UP (ref 6–8.3)
PROTHROM AB SERPL-ACNC: 12.8 SEC — SIGNIFICANT CHANGE UP (ref 9.8–13.1)
RBC # BLD: 4.35 M/UL — SIGNIFICANT CHANGE UP (ref 3.8–5.2)
RBC # FLD: 13.2 % — SIGNIFICANT CHANGE UP (ref 10.3–14.5)
SODIUM SERPL-SCNC: 140 MMOL/L — SIGNIFICANT CHANGE UP (ref 135–145)
TRIGL SERPL-MCNC: 39 MG/DL — SIGNIFICANT CHANGE UP (ref 10–149)
TROPONIN T, HIGH SENSITIVITY: < 6 NG/L — SIGNIFICANT CHANGE UP (ref ?–14)
TROPONIN T, HIGH SENSITIVITY: < 6 NG/L — SIGNIFICANT CHANGE UP (ref ?–14)
TSH SERPL-MCNC: 1.61 UIU/ML — SIGNIFICANT CHANGE UP (ref 0.27–4.2)
WBC # BLD: 6.22 K/UL — SIGNIFICANT CHANGE UP (ref 3.8–10.5)
WBC # FLD AUTO: 6.22 K/UL — SIGNIFICANT CHANGE UP (ref 3.8–10.5)

## 2018-11-19 PROCEDURE — 70547 MR ANGIOGRAPHY NECK W/O DYE: CPT | Mod: 26

## 2018-11-19 PROCEDURE — 99233 SBSQ HOSP IP/OBS HIGH 50: CPT

## 2018-11-19 PROCEDURE — 99223 1ST HOSP IP/OBS HIGH 75: CPT | Mod: GC

## 2018-11-19 PROCEDURE — 70551 MRI BRAIN STEM W/O DYE: CPT | Mod: 26

## 2018-11-19 RX ORDER — ACETAMINOPHEN 500 MG
650 TABLET ORAL EVERY 6 HOURS
Qty: 0 | Refills: 0 | Status: DISCONTINUED | OUTPATIENT
Start: 2018-11-19 | End: 2018-11-22

## 2018-11-19 RX ORDER — BUDESONIDE AND FORMOTEROL FUMARATE DIHYDRATE 160; 4.5 UG/1; UG/1
2 AEROSOL RESPIRATORY (INHALATION)
Qty: 0 | Refills: 0 | Status: DISCONTINUED | OUTPATIENT
Start: 2018-11-19 | End: 2018-11-22

## 2018-11-19 RX ORDER — ASPIRIN/CALCIUM CARB/MAGNESIUM 324 MG
81 TABLET ORAL DAILY
Qty: 0 | Refills: 0 | Status: DISCONTINUED | OUTPATIENT
Start: 2018-11-19 | End: 2018-11-22

## 2018-11-19 RX ORDER — ALBUTEROL 90 UG/1
1 AEROSOL, METERED ORAL EVERY 6 HOURS
Qty: 0 | Refills: 0 | Status: DISCONTINUED | OUTPATIENT
Start: 2018-11-19 | End: 2018-11-22

## 2018-11-19 RX ORDER — SODIUM CHLORIDE 9 MG/ML
1000 INJECTION, SOLUTION INTRAVENOUS
Qty: 0 | Refills: 0 | Status: DISCONTINUED | OUTPATIENT
Start: 2018-11-19 | End: 2018-11-22

## 2018-11-19 RX ADMIN — Medication 81 MILLIGRAM(S): at 14:33

## 2018-11-19 RX ADMIN — ENOXAPARIN SODIUM 40 MILLIGRAM(S): 100 INJECTION SUBCUTANEOUS at 14:34

## 2018-11-19 RX ADMIN — BUDESONIDE AND FORMOTEROL FUMARATE DIHYDRATE 2 PUFF(S): 160; 4.5 AEROSOL RESPIRATORY (INHALATION) at 21:36

## 2018-11-19 RX ADMIN — ATORVASTATIN CALCIUM 80 MILLIGRAM(S): 80 TABLET, FILM COATED ORAL at 21:36

## 2018-11-19 RX ADMIN — Medication 650 MILLIGRAM(S): at 22:13

## 2018-11-19 RX ADMIN — SODIUM CHLORIDE 50 MILLILITER(S): 9 INJECTION, SOLUTION INTRAVENOUS at 02:19

## 2018-11-19 RX ADMIN — Medication 650 MILLIGRAM(S): at 21:31

## 2018-11-19 RX ADMIN — BUDESONIDE AND FORMOTEROL FUMARATE DIHYDRATE 2 PUFF(S): 160; 4.5 AEROSOL RESPIRATORY (INHALATION) at 14:25

## 2018-11-19 NOTE — SWALLOW BEDSIDE ASSESSMENT ADULT - SWALLOW EVAL: RECOMMENDED FEEDING/EATING TECHNIQUES
maintain upright posture during/after eating for 30 mins/allow for swallow between intakes/no straws/position upright (90 degrees)/small sips/bites

## 2018-11-19 NOTE — PROGRESS NOTE ADULT - SUBJECTIVE AND OBJECTIVE BOX
Patient is a 57y old  Female who presents with a chief complaint of chest pain (18 Nov 2018 22:22)      SUBJECTIVE / OVERNIGHT EVENTS:    No acute event o/n. this am reports some residual occipital headache and mild word finding difficulty. Denies chest pain, sob, palpitation. Says she is "80% back to normal self"     Review of Systems:   Neuro: +headache, word finding difficulty  Heart: no chest pain, palpitation      MEDICATIONS  (STANDING):  aspirin  chewable 81 milliGRAM(s) Oral daily  atorvastatin 80 milliGRAM(s) Oral at bedtime  buDESOnide  80 MICROgram(s)/formoterol 4.5 MICROgram(s) Inhaler 2 Puff(s) Inhalation two times a day  dextrose 5% + lactated ringers. 1000 milliLiter(s) (50 mL/Hr) IV Continuous <Continuous>  enoxaparin Injectable 40 milliGRAM(s) SubCutaneous daily    MEDICATIONS  (PRN):  ALBUTerol    90 MICROgram(s) HFA Inhaler 1 Puff(s) Inhalation every 6 hours PRN Shortness of Breath and/or Wheezing      PHYSICAL EXAM:  T(C): 36.9 (11-19-18 @ 04:50), Max: 37.3 (11-18-18 @ 18:06)  HR: 68 (11-19-18 @ 10:15) (64 - 87)  BP: 116/70 (11-19-18 @ 10:15) (116/70 - 154/80)  RR: 16 (11-19-18 @ 10:15) (15 - 16)  SpO2: 100% (11-19-18 @ 10:15) (96% - 100%)    I&O's Summary    GENERAL: NAD, well-developed  HEAD:  Atraumatic, Normocephalic  EYES: EOMI, PERRLA, conjunctiva and sclera clear  NECK: Supple, No elevated JVD  CHEST/LUNG: Clear to auscultation bilaterally; No wheeze  HEART: Regular rate and rhythm; No murmurs, rubs, or gallops  ABDOMEN: Soft, Nontender, Nondistended; Bowel sounds present  EXTREMITIES:  2+ Peripheral Pulses, No clubbing, cyanosis, or edema  PSYCH: AAOx3  NEUROLOGY: + mild expressive dysphagia CN II-XII grossly intact, moving all extremities  SKIN: No rashes or lesions    LABS:  CAPILLARY BLOOD GLUCOSE                              12.5   6.22  )-----------( 261      ( 19 Nov 2018 05:00 )             38.0     11-19    140  |  107  |  10  ----------------------------<  113<H>  3.6   |  21<L>  |  0.76    Ca    8.8      19 Nov 2018 05:00  Phos  3.1     11-19  Mg     2.1     11-19    TPro  6.3  /  Alb  3.7  /  TBili  0.8  /  DBili  x   /  AST  20  /  ALT  13  /  AlkPhos  74  11-19    PT/INR - ( 19 Nov 2018 05:00 )   PT: 12.8 SEC;   INR: 1.12          PTT - ( 19 Nov 2018 05:00 )  PTT:28.8 SEC  CARDIAC MARKERS ( 19 Nov 2018 05:00 )  x     / x     / 223 u/L / x     / x      CARDIAC MARKERS ( 19 Nov 2018 01:40 )  x     / x     / 247 u/L / x     / x              RADIOLOGY & ADDITIONAL TESTS:    MR brain: no acute CVA  MRA head/neck: normal

## 2018-11-19 NOTE — PROGRESS NOTE ADULT - ASSESSMENT
56yo Female hx of MI, CHF, Asthma, recent pericardial effusion (observation only, f/u every 6months), known LBBB a/w CP and lightheadedness, found to have new expressive dysphagia, Initial stroke w/u did not reveal acute infarct. Symptoms now resolving- possibly TIA

## 2018-11-19 NOTE — PATIENT PROFILE ADULT - NSPROGENDIFFINTUB_GEN_A_NUR
Problem: Altered Mood, Depressive Behavior  Goal: LTG-Able to verbalize acceptance of life and situations over which he or she has no control  Outcome: Ongoing                                                                      Group Therapy Note    Date: 1/26/2018  Start Time: 1500  End Time:  1530  Number of Participants: 2    Type of Group: Psychotherapy        Patient's Goal:  Process emotions and actions focusing on how to heal and improve relationships with others. Notes:  CSW invited and encouraged pt to attend group, pt refused/declined. CSW gently reminded pt that group is a part of treatment, pt continued to decline, and CSW informed nursing staff of non-compliance.       Discipline Responsible: /Counselor      Signature:  Love Burgos 23 never intubated

## 2018-11-19 NOTE — PROGRESS NOTE ADULT - PROBLEM SELECTOR PLAN 2
no e/o overt fluid overload   -obtain TTE to eval LVEF  -holding b-blocker and arb as per CVA protocol (permissive hypertension)

## 2018-11-19 NOTE — SWALLOW BEDSIDE ASSESSMENT ADULT - SWALLOW EVAL: DIAGNOSIS
Patient demonstrated functional oral and pharyngeal stages of swallow function characterized by functional bolus collection, manipulation and transfer, timely pharyngeal trigger and adequate hyolaryngeal elevation upon digital palpation. No overt, clinical s/s of laryngeal penetration/aspiration noted across trials.

## 2018-11-19 NOTE — PROGRESS NOTE ADULT - PROBLEM SELECTOR PLAN 1
-MRI/MRA brain no acute infarct- ? TIA  -will obtain neurology f/u  -cont asa, statin for now  -f/u TTE with bubble study  -passed swallow eval. will start solid food with thin liquids as recommended  -cont tele monitoring

## 2018-11-19 NOTE — SWALLOW BEDSIDE ASSESSMENT ADULT - COMMENTS
Patient was received awake, alert and cooperative. Mild word finding deficits noted upon informal assessment during conversational discourse. As per RN, patient passed swallow screen earlier today. Recommendations discussed with patient, family and RN.    Of note, orders also received for a speech-language evaluation. Given highly distractible environment (patient presently on stretcher in hallway in [facility] with family members present), comprehensive speech-language evaluation deferred until patient is moved to medical floor or more private area. SLP to follow up as schedule permits.

## 2018-11-20 DIAGNOSIS — G45.9 TRANSIENT CEREBRAL ISCHEMIC ATTACK, UNSPECIFIED: ICD-10-CM

## 2018-11-20 LAB
BUN SERPL-MCNC: 10 MG/DL — SIGNIFICANT CHANGE UP (ref 7–23)
CALCIUM SERPL-MCNC: 9.2 MG/DL — SIGNIFICANT CHANGE UP (ref 8.4–10.5)
CHLORIDE SERPL-SCNC: 106 MMOL/L — SIGNIFICANT CHANGE UP (ref 98–107)
CO2 SERPL-SCNC: 27 MMOL/L — SIGNIFICANT CHANGE UP (ref 22–31)
CREAT SERPL-MCNC: 0.85 MG/DL — SIGNIFICANT CHANGE UP (ref 0.5–1.3)
GLUCOSE SERPL-MCNC: 89 MG/DL — SIGNIFICANT CHANGE UP (ref 70–99)
HCT VFR BLD CALC: 39.7 % — SIGNIFICANT CHANGE UP (ref 34.5–45)
HGB BLD-MCNC: 12.4 G/DL — SIGNIFICANT CHANGE UP (ref 11.5–15.5)
MCHC RBC-ENTMCNC: 27.6 PG — SIGNIFICANT CHANGE UP (ref 27–34)
MCHC RBC-ENTMCNC: 31.2 % — LOW (ref 32–36)
MCV RBC AUTO: 88.2 FL — SIGNIFICANT CHANGE UP (ref 80–100)
NRBC # FLD: 0 — SIGNIFICANT CHANGE UP
PLATELET # BLD AUTO: 291 K/UL — SIGNIFICANT CHANGE UP (ref 150–400)
PMV BLD: 9.2 FL — SIGNIFICANT CHANGE UP (ref 7–13)
POTASSIUM SERPL-MCNC: 4.1 MMOL/L — SIGNIFICANT CHANGE UP (ref 3.5–5.3)
POTASSIUM SERPL-SCNC: 4.1 MMOL/L — SIGNIFICANT CHANGE UP (ref 3.5–5.3)
RBC # BLD: 4.5 M/UL — SIGNIFICANT CHANGE UP (ref 3.8–5.2)
RBC # FLD: 13.2 % — SIGNIFICANT CHANGE UP (ref 10.3–14.5)
SODIUM SERPL-SCNC: 142 MMOL/L — SIGNIFICANT CHANGE UP (ref 135–145)
WBC # BLD: 6.83 K/UL — SIGNIFICANT CHANGE UP (ref 3.8–10.5)
WBC # FLD AUTO: 6.83 K/UL — SIGNIFICANT CHANGE UP (ref 3.8–10.5)

## 2018-11-20 PROCEDURE — 93306 TTE W/DOPPLER COMPLETE: CPT | Mod: 26

## 2018-11-20 PROCEDURE — 99233 SBSQ HOSP IP/OBS HIGH 50: CPT

## 2018-11-20 PROCEDURE — 70498 CT ANGIOGRAPHY NECK: CPT | Mod: 26

## 2018-11-20 PROCEDURE — 70496 CT ANGIOGRAPHY HEAD: CPT | Mod: 26

## 2018-11-20 PROCEDURE — 93880 EXTRACRANIAL BILAT STUDY: CPT | Mod: 26

## 2018-11-20 RX ORDER — CLOPIDOGREL BISULFATE 75 MG/1
75 TABLET, FILM COATED ORAL DAILY
Qty: 0 | Refills: 0 | Status: DISCONTINUED | OUTPATIENT
Start: 2018-11-20 | End: 2018-11-22

## 2018-11-20 RX ADMIN — SODIUM CHLORIDE 50 MILLILITER(S): 9 INJECTION, SOLUTION INTRAVENOUS at 21:33

## 2018-11-20 RX ADMIN — ENOXAPARIN SODIUM 40 MILLIGRAM(S): 100 INJECTION SUBCUTANEOUS at 12:05

## 2018-11-20 RX ADMIN — BUDESONIDE AND FORMOTEROL FUMARATE DIHYDRATE 2 PUFF(S): 160; 4.5 AEROSOL RESPIRATORY (INHALATION) at 12:05

## 2018-11-20 RX ADMIN — Medication 81 MILLIGRAM(S): at 12:05

## 2018-11-20 RX ADMIN — SODIUM CHLORIDE 50 MILLILITER(S): 9 INJECTION, SOLUTION INTRAVENOUS at 05:05

## 2018-11-20 RX ADMIN — ATORVASTATIN CALCIUM 80 MILLIGRAM(S): 80 TABLET, FILM COATED ORAL at 21:33

## 2018-11-20 RX ADMIN — BUDESONIDE AND FORMOTEROL FUMARATE DIHYDRATE 2 PUFF(S): 160; 4.5 AEROSOL RESPIRATORY (INHALATION) at 21:33

## 2018-11-20 NOTE — OCCUPATIONAL THERAPY INITIAL EVALUATION ADULT - PHYSICAL ASSIST/NONPHYSICAL ASSIST: SIT/SUPINE, REHAB EVAL
Have Your Spot(S) Been Treated In The Past?: has not been treated
Hpi Title: Evaluation of Skin Lesions
Family Member: Mother and cousin
Location: Right superior lateral chest, left abdomen, right antecubital fossa, and forehead
Year Removed: 2016, 2015, 2013, 2008
supervision/verbal cues

## 2018-11-20 NOTE — PHYSICAL THERAPY INITIAL EVALUATION ADULT - ADDITIONAL COMMENTS
Patient reports she lives with her son in a private house, 6 steps to enter. Patient reports she was previously independent in all ADLs and did not use an assistive device for ambulation.     Patient was left semi-supine in bed as found, all lines/tubes intact and call mcguire within reach, JEFRY mcgarry

## 2018-11-20 NOTE — SPEECH LANGUAGE PATHOLOGY EVALUATION - COMMENTS
Patient is a 57 year old female with PMHx of CAD MI LBBB and ?? CHF not fully optimised on MEDS only on ACE abd BB but no ASA and no Statins and hx of Asthma non comlant with symbicort presented with C/P, while in ER noted with Acute aphasia Code Stroke was called But Pt refused TPA admitted to TELE. MRI/MRA showed no acute infart. Suspect TIA.    Patient is known to this service from initial clinical evaluation of swallow function on 11/19/18 (see chart for full report) as well as initial attempt at speech-language evaluation this AM at which time patient was off unit. Patient was received awake, alert and cooperative this PM. Patient would benefit from skilled speech-language therapy following discharge to appropriate setting (i.e. rehab vs home care vs outpatient services). [facility] can be reached at [phone]; patient provided with contact information.

## 2018-11-20 NOTE — PHYSICAL THERAPY INITIAL EVALUATION ADULT - GAIT DISTANCE, PT EVAL
Patient with 2 episodes of loss of balance secondary to unilateral LE buckling. Patient denied lightheadedness/dizziness, BP WNL. JEFRY MOROCHO present & aware./150 feet 150 feet/Patient with 2 episodes of loss of balance requiring therapist assistance for balance recovery towards end of gait assessment. Patient denied lightheadedness/dizziness, BP WNL. JEFRY MOROCHO present & aware. 150 feet/Patient with 2 episodes of loss of balance towards end of gait assessment, requiring therapist assistance for balance recovery. Patient denied lightheadedness/dizziness, BP WNL. JEFRY MOROCHO present & aware.

## 2018-11-20 NOTE — OCCUPATIONAL THERAPY INITIAL EVALUATION ADULT - PLANNED THERAPY INTERVENTIONS, OT EVAL
ADL retraining/balance training/transfer training/bed mobility training/strengthening/neuromuscular re-education

## 2018-11-20 NOTE — PHYSICAL THERAPY INITIAL EVALUATION ADULT - PERTINENT HX OF CURRENT PROBLEM, REHAB EVAL
Patient is a 57 year old female admitted to [facility] on 11/18 with chest pain and lightheadedness, with later developed expressive aphasia. PMH includes: MI, CHF, Asthma, recent pericardial effusion. MR Head: No evidence of acute cerebral ischemia.

## 2018-11-20 NOTE — SPEECH LANGUAGE PATHOLOGY EVALUATION - SLP DIAGNOSIS
1) Expressive Language skills found to be WFLs and characterized by adequate automatic sequence production, confrontational naming, responsive naming, picture description and verbal expression of basic wants/needs; 2) Receptive Language skills found to be WFLs and characterized by adequate auditory comprehension of multistep directives, basic wh- questions, simple paragraphs and simple conversational discourse; 3) Mild-Moderate Motor Speech Impairment marked by inconsistent dysfluency on the initial phoneme of words (i.e. b-b-b-ball, c-c-c-clock) during structured tasks (i.e. naming, repetition) and spontaneous conversational discourse, suggesting acquired dysfluency with possible apraxia component; 4) Simple cognitive-linguistic abilities including orientation, short-term memory, verbal reasoning and problem solving found to be WFLs.

## 2018-11-20 NOTE — PROGRESS NOTE ADULT - SUBJECTIVE AND OBJECTIVE BOX
Patient is a 57y old  Female who presents with a chief complaint of chest pain (19 Nov 2018 15:33)      SUBJECTIVE / OVERNIGHT EVENTS:    No acute event o/n. states headache has improved. denies chest pain and palpitations     Review of Systems:   Neuro: +diffuculty finding words, improved  Heart: no chest pain  Lungs: no SOB    MEDICATIONS  (STANDING):  aspirin  chewable 81 milliGRAM(s) Oral daily  atorvastatin 80 milliGRAM(s) Oral at bedtime  buDESOnide  80 MICROgram(s)/formoterol 4.5 MICROgram(s) Inhaler 2 Puff(s) Inhalation two times a day  clopidogrel Tablet 75 milliGRAM(s) Oral daily  dextrose 5% + lactated ringers. 1000 milliLiter(s) (50 mL/Hr) IV Continuous <Continuous>  enoxaparin Injectable 40 milliGRAM(s) SubCutaneous daily    MEDICATIONS  (PRN):  acetaminophen   Tablet .. 650 milliGRAM(s) Oral every 6 hours PRN Mild Pain (1 - 3), Moderate Pain (4 - 6), Severe Pain (7 - 10)  ALBUTerol    90 MICROgram(s) HFA Inhaler 1 Puff(s) Inhalation every 6 hours PRN Shortness of Breath and/or Wheezing      PHYSICAL EXAM:  T(C): 36.9 (11-20-18 @ 12:36), Max: 36.9 (11-20-18 @ 12:36)  HR: 83 (11-20-18 @ 12:36) (61 - 83)  BP: 112/67 (11-20-18 @ 12:36) (101/60 - 135/64)  RR: 18 (11-20-18 @ 12:36) (16 - 18)  SpO2: 96% (11-20-18 @ 12:36) (96% - 100%)  I&O's Summary    20 Nov 2018 07:01  -  20 Nov 2018 13:56  --------------------------------------------------------  IN: 440 mL / OUT: 0 mL / NET: 440 mL      GENERAL: NAD  HEAD:  Atraumatic, Normocephalic  EYES: EOMI, PERRLA, conjunctiva and sclera clear  NECK: Supple, No elevated JVD  CHEST/LUNG: Clear to auscultation bilaterally; No wheeze  HEART: Regular rate and rhythm; No murmurs, rubs, or gallops  ABDOMEN: Soft, Nontender, Nondistended; Bowel sounds present  EXTREMITIES:  2+ Peripheral Pulses, No clubbing, cyanosis, or edema  PSYCH: AAOx3  NEUROLOGY: + expressive aphasia, improved. CN II-XII grossly intact, moving all extremities  SKIN: No rashes or lesions    LABS:  CAPILLARY BLOOD GLUCOSE                              12.4   6.83  )-----------( 291      ( 20 Nov 2018 05:00 )             39.7     11-20    142  |  106  |  10  ----------------------------<  89  4.1   |  27  |  0.85    Ca    9.2      20 Nov 2018 05:00  Phos  3.1     11-19  Mg     2.1     11-19    TPro  6.3  /  Alb  3.7  /  TBili  0.8  /  DBili  x   /  AST  20  /  ALT  13  /  AlkPhos  74  11-19    PT/INR - ( 19 Nov 2018 05:00 )   PT: 12.8 SEC;   INR: 1.12          PTT - ( 19 Nov 2018 05:00 )  PTT:28.8 SEC  CARDIAC MARKERS ( 19 Nov 2018 05:00 )  x     / x     / 223 u/L / x     / x      CARDIAC MARKERS ( 19 Nov 2018 01:40 )  x     / x     / 247 u/L / x     / x              RADIOLOGY & ADDITIONAL TESTS:    Imaging Personally Reviewed:    Carotid Duplex: no significant stenosis     Consultant(s) Notes Reviewed:  neurology

## 2018-11-20 NOTE — PROGRESS NOTE ADULT - ASSESSMENT
58yo Female hx of MI, CHF, Asthma, recent pericardial effusion (observation only, f/u every 6months), known LBBB a/w CP and lightheadedness, found to have new expressive dysphagia, Initial stroke w/u did not reveal acute infarct. Symptoms now resolving- possibly TIA

## 2018-11-20 NOTE — SPEECH LANGUAGE PATHOLOGY EVALUATION - COMMENTS
SLP f/u for comprehensive speech-language evaluation as per plan. Patient currently off unit for testing. SLP to follow up as schedule permits. Primary RN (Betzaida) aware.

## 2018-11-20 NOTE — PHYSICAL THERAPY INITIAL EVALUATION ADULT - PATIENT PROFILE REVIEW, REHAB EVAL
PT orders received-->no formal activity order. Consult with JEFRY MOROCHO-->pt OK to participate in PT evaluation and ambulate with PT./yes

## 2018-11-20 NOTE — OCCUPATIONAL THERAPY INITIAL EVALUATION ADULT - PERTINENT HX OF CURRENT PROBLEM, REHAB EVAL
57 y.o. F hx of MI, CHF, Asthma, recent pericardial effusion (observation only, f/u every 6months), known LBBB presented to [facility] with chest pain and lightheadedness starting. Pt later developed expressive aphasia. MRI Head: Normal noncontrast MRI of the brain. No evidence of acute cerebral ischemia.

## 2018-11-20 NOTE — PHYSICAL THERAPY INITIAL EVALUATION ADULT - LEVEL OF INDEPENDENCE: STAIR NEGOTIATION, REHAB EVAL
unable to assess at this time secondary to pt with 2 episodes of near loss of balance. Will assess when safe and appropriate

## 2018-11-20 NOTE — OCCUPATIONAL THERAPY INITIAL EVALUATION ADULT - STANDING BALANCE: DYNAMIC, REHAB EVAL
During standing balance assessment, pt noted to have two episodes of loss of balance requiring therapist assistance for balance recovery. Vitals stable. Pt denied dizziness./fair balance

## 2018-11-20 NOTE — PROGRESS NOTE ADULT - PROBLEM SELECTOR PLAN 1
neurologically improving, now close to baseline   -MRI/MRA brain no acute infarct  -tele reviewed, no sig arrythmia in the past 24hr, will cont to monitor   -awaiting CT head/neck and TTE with bubble study to further characterize vascular/ embolic risk   -cont asa, plavix, statin   -permissive /110  -passed swallow eval. cont solid food with thin liquids as recommended neurologically improving, but not yet back to baseline   -MRI/MRA brain no acute infarct  -tele reviewed, no sig arrythmia in the past 24hr, will cont to monitor   -awaiting CT head/neck and TTE with bubble study to further characterize vascular/ embolic risk   -cont asa, plavix, statin   -permissive /110  -cont close neuro monitoring  -f/u neurology consult   -passed swallow eval. cont solid food with thin liquids as recommended

## 2018-11-21 LAB
BUN SERPL-MCNC: 8 MG/DL — SIGNIFICANT CHANGE UP (ref 7–23)
CALCIUM SERPL-MCNC: 8.8 MG/DL — SIGNIFICANT CHANGE UP (ref 8.4–10.5)
CHLORIDE SERPL-SCNC: 106 MMOL/L — SIGNIFICANT CHANGE UP (ref 98–107)
CO2 SERPL-SCNC: 26 MMOL/L — SIGNIFICANT CHANGE UP (ref 22–31)
CREAT SERPL-MCNC: 0.78 MG/DL — SIGNIFICANT CHANGE UP (ref 0.5–1.3)
GLUCOSE SERPL-MCNC: 105 MG/DL — HIGH (ref 70–99)
HCT VFR BLD CALC: 39.3 % — SIGNIFICANT CHANGE UP (ref 34.5–45)
HGB BLD-MCNC: 12.7 G/DL — SIGNIFICANT CHANGE UP (ref 11.5–15.5)
MAGNESIUM SERPL-MCNC: 1.9 MG/DL — SIGNIFICANT CHANGE UP (ref 1.6–2.6)
MCHC RBC-ENTMCNC: 28.3 PG — SIGNIFICANT CHANGE UP (ref 27–34)
MCHC RBC-ENTMCNC: 32.3 % — SIGNIFICANT CHANGE UP (ref 32–36)
MCV RBC AUTO: 87.7 FL — SIGNIFICANT CHANGE UP (ref 80–100)
NRBC # FLD: 0 — SIGNIFICANT CHANGE UP
PLATELET # BLD AUTO: 268 K/UL — SIGNIFICANT CHANGE UP (ref 150–400)
PMV BLD: 9 FL — SIGNIFICANT CHANGE UP (ref 7–13)
POTASSIUM SERPL-MCNC: 3.7 MMOL/L — SIGNIFICANT CHANGE UP (ref 3.5–5.3)
POTASSIUM SERPL-SCNC: 3.7 MMOL/L — SIGNIFICANT CHANGE UP (ref 3.5–5.3)
RBC # BLD: 4.48 M/UL — SIGNIFICANT CHANGE UP (ref 3.8–5.2)
RBC # FLD: 13 % — SIGNIFICANT CHANGE UP (ref 10.3–14.5)
SODIUM SERPL-SCNC: 141 MMOL/L — SIGNIFICANT CHANGE UP (ref 135–145)
WBC # BLD: 6.39 K/UL — SIGNIFICANT CHANGE UP (ref 3.8–10.5)
WBC # FLD AUTO: 6.39 K/UL — SIGNIFICANT CHANGE UP (ref 3.8–10.5)

## 2018-11-21 PROCEDURE — 33282: CPT

## 2018-11-21 PROCEDURE — 99223 1ST HOSP IP/OBS HIGH 75: CPT | Mod: 57

## 2018-11-21 PROCEDURE — 99233 SBSQ HOSP IP/OBS HIGH 50: CPT

## 2018-11-21 PROCEDURE — 99222 1ST HOSP IP/OBS MODERATE 55: CPT | Mod: GC

## 2018-11-21 RX ADMIN — Medication 81 MILLIGRAM(S): at 11:46

## 2018-11-21 RX ADMIN — SODIUM CHLORIDE 50 MILLILITER(S): 9 INJECTION, SOLUTION INTRAVENOUS at 10:07

## 2018-11-21 RX ADMIN — BUDESONIDE AND FORMOTEROL FUMARATE DIHYDRATE 2 PUFF(S): 160; 4.5 AEROSOL RESPIRATORY (INHALATION) at 22:24

## 2018-11-21 RX ADMIN — SODIUM CHLORIDE 50 MILLILITER(S): 9 INJECTION, SOLUTION INTRAVENOUS at 22:26

## 2018-11-21 RX ADMIN — CLOPIDOGREL BISULFATE 75 MILLIGRAM(S): 75 TABLET, FILM COATED ORAL at 11:46

## 2018-11-21 RX ADMIN — ATORVASTATIN CALCIUM 80 MILLIGRAM(S): 80 TABLET, FILM COATED ORAL at 22:24

## 2018-11-21 RX ADMIN — BUDESONIDE AND FORMOTEROL FUMARATE DIHYDRATE 2 PUFF(S): 160; 4.5 AEROSOL RESPIRATORY (INHALATION) at 10:07

## 2018-11-21 RX ADMIN — ENOXAPARIN SODIUM 40 MILLIGRAM(S): 100 INJECTION SUBCUTANEOUS at 11:45

## 2018-11-21 NOTE — CONSULT NOTE ADULT - SUBJECTIVE AND OBJECTIVE BOX
HPI:  58yo Female hx of MI, CHF, Asthma, recent pericardial effusion (observation only, f/u every 6months), known LBBB presenting with chest pain and lightheadedness starting at 12:30pm on day of admission who later developed expressive aphasia. During discussion with pt, no complaints of CP/SOB; pt also denying recent weight gain, LE edema, or other cardiac-related sx. Per pt, stated that she was in her [employer] when she began to have some chest tightness around 12:30, for which she took a baby aspirin and felt belt.  She was in [employer] today and when she came home, she had a headache around 4 PM, which she also had during the interview; HA is in L occipito-parietal region, 6-7/10 w/o radiation, mild light sensitivity but denies n/v/phonophobia; pt does not have a hx of HA.  Pt was then face-timing with her sister when she began having difficulty getting words out; her sister was very concerned given her cardiac hx and suggested she come into the ED.  In the ED, Neurology was consulted for sudden change in speech. Last known well 1600. . In the ED, Code Stroke code, patient with loss of speech fluency and repetition deficits, yet retained understanding. Pt refused tPA after risk/benefit discussion.      MRI/MRA brain no acute infarct  CT head/neck and TTE with bubble study-, no significant findings   -case discussed with stroke team- recommended loop recorder placement        REVIEW OF SYSTEMS: No chest pain, shortness of breath, nausea, vomiting or diarhea.      PAST MEDICAL & SURGICAL HISTORY  Myocardial infarction, unspecified MI type, unspecified artery  CHF (congestive heart failure)  LBBB (left bundle branch block)  Asthma  No significant past surgical history      SOCIAL HISTORY  Smoking - Denied, EtOH - Denied, Drugs - Denied    FUNCTIONAL HISTORY:   Lives with family 6 steps  Independent PTA    CURRENT FUNCTIONAL STATUS: independent      FAMILY HISTORY   No pertinent family history in first degree relatives      RECENT LABS/IMAGING  CBC Full  -  ( 21 Nov 2018 05:10 )  WBC Count : 6.39 K/uL  Hemoglobin : 12.7 g/dL  Hematocrit : 39.3 %  Platelet Count - Automated : 268 K/uL  Mean Cell Volume : 87.7 fL  Mean Cell Hemoglobin : 28.3 pg  Mean Cell Hemoglobin Concentration : 32.3 %  Auto Neutrophil # : x  Auto Lymphocyte # : x  Auto Monocyte # : x  Auto Eosinophil # : x  Auto Basophil # : x  Auto Neutrophil % : x  Auto Lymphocyte % : x  Auto Monocyte % : x  Auto Eosinophil % : x  Auto Basophil % : x    11-21    141  |  106  |  8   ----------------------------<  105<H>  3.7   |  26  |  0.78    Ca    8.8      21 Nov 2018 05:10  Mg     1.9     11-21          VITALS  T(C): 36.6 (11-21-18 @ 12:34), Max: 37 (11-21-18 @ 09:30)  HR: 61 (11-21-18 @ 12:34) (61 - 72)  BP: 110/69 (11-21-18 @ 12:34) (105/63 - 126/70)  RR: 18 (11-21-18 @ 12:34) (17 - 18)  SpO2: 99% (11-21-18 @ 12:34) (98% - 100%)  Wt(kg): --    ALLERGIES  No Known Allergies      MEDICATIONS   acetaminophen   Tablet .. 650 milliGRAM(s) Oral every 6 hours PRN  ALBUTerol    90 MICROgram(s) HFA Inhaler 1 Puff(s) Inhalation every 6 hours PRN  aspirin  chewable 81 milliGRAM(s) Oral daily  atorvastatin 80 milliGRAM(s) Oral at bedtime  buDESOnide  80 MICROgram(s)/formoterol 4.5 MICROgram(s) Inhaler 2 Puff(s) Inhalation two times a day  clopidogrel Tablet 75 milliGRAM(s) Oral daily  dextrose 5% + lactated ringers. 1000 milliLiter(s) IV Continuous <Continuous>  enoxaparin Injectable 40 milliGRAM(s) SubCutaneous daily      ----------------------------------------------------------------------------------------  PHYSICAL EXAM  Constitutional - NAD, Comfortable  HEENT - NCAT, EOMI  Neck - Supple, No limited ROM  Chest - CTA bilaterally, No wheeze, No rhonchi, No crackles  Cardiovascular - RRR, S1S2, No murmurs  Abdomen - BS+, Soft, NTND  Extremities - No C/C/E, No calf tenderness   Neurologic Exam -                    Cognitive - Awake, Alert, AAO to self, place, date, year, situation     Communication - Fluent, No dysarthria, no aphasia     Cranial Nerves - CN 2-12 intact     Motor - No focal deficits                       Sensory - Intact to LT     Reflexes - DTR Intact, No primitive reflexive     Balance - WNL Static  Psychiatric - Mood stable, Affect WNL

## 2018-11-21 NOTE — PROGRESS NOTE ADULT - PROBLEM SELECTOR PLAN 2
no e/o overt fluid overload   -TTE reviewed, LVEF wnl   -holding b-blocker and arb as per CVA protocol (permissive hypertension) chronic diastolic CHF, no e/o overt fluid overload   -TTE reviewed, LVEF wnl   -holding b-blocker and arb as per CVA protocol (permissive hypertension)

## 2018-11-21 NOTE — CONSULT NOTE ADULT - ASSESSMENT
56yo F hx of MI, CHF, recent pericardial effusion(observation only, f/u 6months), known LBBB presenting with chest pain and lightheadedness starting at 12:30pm on day of admission. Neurology consulted for speech difficulty. NIHSS 0, MRS 0. CT head negative. Patient with isolated Broca's aphasia. Patient within TPA window, risks and benefits of TPA discussed with patient at family at bedside; patient and family demonstrating understanding that TPA could lead to improvement of deficits as well as risk of bleeding, disability, death. At this time, patient/family refusing TPA administration.     Impression: Broca's aphasia 2/2 unknown mechanism.      Recs.   Would get MRI brain without contrast to look at the extent and distribution of the stroke and MRA H/N to look at the cerebral vasculature.   Aspirin for secondary stroke prevention at this time. Atorvastatin 80, titrate the dose according to LDL.   TTE with bubble study and telemetry to look for a cardiac source of embolism.   DVT prophylaxis, Neurochecks  Permissive HTN up to 220/120 mmHg for first 24 hours after the symptom onset followed by gradual normotension.   HbA1C and LDL.   Speech and swallow/safety eval.  Will defer decision to start CHANCE/POINT protocol to attending in AM.
This a 58 yo female with past medical history  of CAD s/p MI 2010, congestive heart failure, recent pericardial effusion, known LBBB and asthma who presented with chest discomfort, headache, lightheadedness, right hand tingling, right facial droop and expressive aphasia. Head CT and MRI brain negaitve for acute CVA. Symptoms completely resolved within 24 hours. No EKG or telemetry evidence of atrial fibrillation/PAF.  She would benefit from prolonged monitoring for detection of PAF/AFib as cause of cardioembolic source of her TIA.  After discussing the risks, benefits and alternatives to the device and how it applies to her, she has agreed and consented and is scheduled for this afternoon.  She will be called by our  on Friday to schedule an outpatient follow-up appointment.
aphasia, ? TIA  Symptoms appear to have resolved, may benefot from outpatient language eval at [facility]  No PT/OT needs

## 2018-11-21 NOTE — PROGRESS NOTE ADULT - PROBLEM SELECTOR PLAN 1
neurologically improving, but not yet back to baseline   -MRI/MRA brain no acute infarct  -tele reviewed, no sig arrythmia in the past 48hr  -CT head/neck and TTE with bubble study reviewed, no significant findings   -case discussed with stroke team- recommended loop recorder placement given concerns of cardiac embolic event need to r/o pAfib  -EP consulted for loop recorder- will f/u recs  -cont asa, plavix, statin   -permissive /110  -cont close neuro monitoring  -passed swallow eval. cont solid food with thin liquids as recommended  -cont PT/OT

## 2018-11-21 NOTE — PROGRESS NOTE ADULT - SUBJECTIVE AND OBJECTIVE BOX
SUBJECTIVE / OVERNIGHT EVENTS:    Reports mild occipital headache persistent since yesterday. No dizziness. No N/V.  No weakness/loss sensation Ambulating without assistance. Expressive aphagia improved      Review of Systems:    CONSTITUTIONAL: No fever, weight loss, or fatigue  EYES: No eye pain, visual disturbances, or discharge  ENMT:  No difficulty hearing, tinnitus, vertigo; No sinus or throat pain  NECK: No pain or stiffness  RESPIRATORY: No cough, wheezing, chills or hemoptysis; No shortness of breath  CARDIOVASCULAR: No chest pain, palpitations, dizziness, or leg swelling  GASTROINTESTINAL: No abdominal or epigastric pain. No nausea, vomiting, or hematemesis; No diarrhea or constipation. No melena or hematochezia.  GENITOURINARY: No dysuria, frequency, hematuria, or incontinence  SKIN: No itching, burning, rashes, or lesions   MUSCULOSKELETAL: No joint pain or swelling; No muscle, back, or extremity pain  PSYCHIATRIC: No depression, anxiety, mood swings, or difficulty sleeping      MEDICATIONS  (STANDING):  aspirin  chewable 81 milliGRAM(s) Oral daily  atorvastatin 80 milliGRAM(s) Oral at bedtime  buDESOnide  80 MICROgram(s)/formoterol 4.5 MICROgram(s) Inhaler 2 Puff(s) Inhalation two times a day  clopidogrel Tablet 75 milliGRAM(s) Oral daily  dextrose 5% + lactated ringers. 1000 milliLiter(s) (50 mL/Hr) IV Continuous <Continuous>  enoxaparin Injectable 40 milliGRAM(s) SubCutaneous daily    MEDICATIONS  (PRN):  acetaminophen   Tablet .. 650 milliGRAM(s) Oral every 6 hours PRN Mild Pain (1 - 3), Moderate Pain (4 - 6), Severe Pain (7 - 10)  ALBUTerol    90 MICROgram(s) HFA Inhaler 1 Puff(s) Inhalation every 6 hours PRN Shortness of Breath and/or Wheezing      PHYSICAL EXAM:  T(C): 36.6 (11-21-18 @ 12:34), Max: 37 (11-21-18 @ 09:30)  HR: 61 (11-21-18 @ 12:34) (61 - 72)  BP: 110/69 (11-21-18 @ 12:34) (105/63 - 126/70)  RR: 18 (11-21-18 @ 12:34) (17 - 18)  SpO2: 99% (11-21-18 @ 12:34) (98% - 100%)  I&O's Summary    20 Nov 2018 07:01  -  21 Nov 2018 07:00  --------------------------------------------------------  IN: 520 mL / OUT: 0 mL / NET: 520 mL      GENERAL: appearing  female lying in bed in NAD   MENTAL STATUS/PSYCH:  AAO x3   HEAD:  Atraumatic, Normocephalic  EYES: EOMI, PERRLA, conjunctiva and sclera clear  NECK: Supple, No elevated JVD  CHEST/LUNG: Clear to auscultation bilaterally; No wheeze  HEART: Regular rate and rhythm; No murmurs, rubs, or gallops  ABDOMEN: Soft, Nontender, Nondistended; Bowel sounds present  EXTREMITIES:  2+ Peripheral Pulses, No clubbing, cyanosis, or edema  NEUROLOGY: CN II-XII grossly intact, moving all extremities  SKIN: No rashes or lesions    LABS:  CAPILLARY BLOOD GLUCOSE                              12.7   6.39  )-----------( 268      ( 21 Nov 2018 05:10 )             39.3     11-21    141  |  106  |  8   ----------------------------<  105<H>  3.7   |  26  |  0.78    Ca    8.8      21 Nov 2018 05:10  Mg     1.9     11-21        RADIOLOGY & ADDITIONAL TESTS:    Imaging Personally Reviewed:    CTA head/neck- no significant stenosis     Consultant(s) Notes Reviewed:  neurology     Care Discussed with Consultants/Other Providers: neurology

## 2018-11-21 NOTE — CONSULT NOTE ADULT - SUBJECTIVE AND OBJECTIVE BOX
This a 58 yo female with past medical hisotry of CAD s/p MI 2010, congestive heart failure, recent pericardial effusion, known LBBB and asthma who presented with chest discomfort, headache, lightheadedness, right hand tingling, right facial droop and expressive aphasia.  Her symptoms have completely resolved and she is back to baseline.   Head CT negative. MRI brain did not show any evidence of acute infarct. Neurology impression is a cerebral embolism without cerebral infarction involving left MCA distribution presenting as transient left hemispheric dysfunction with the likely etiology being embolism from a proximal source, possibly cardiac.  There is no telemetry or EKG evidence of atrial fibrillation.  She denies history of palpitations or dizziness.  No shortness of breath. We are asked to evaluate her for an ILR.         PAST MEDICAL & SURGICAL HISTORY:  Myocardial infarction, unspecified MI type, unspecified artery  CHF (congestive heart failure)  LBBB (left bundle branch block)  Asthma  No significant past surgical history    MEDICATIONS  (STANDING):  aspirin  chewable 81 milliGRAM(s) Oral daily  atorvastatin 80 milliGRAM(s) Oral at bedtime  buDESOnide  80 MICROgram(s)/formoterol 4.5 MICROgram(s) Inhaler 2 Puff(s) Inhalation two times a day  clopidogrel Tablet 75 milliGRAM(s) Oral daily  dextrose 5% + lactated ringers. 1000 milliLiter(s) (50 mL/Hr) IV Continuous <Continuous>  enoxaparin Injectable 40 milliGRAM(s) SubCutaneous daily    MEDICATIONS  (PRN):  acetaminophen   Tablet .. 650 milliGRAM(s) Oral every 6 hours PRN Mild Pain (1 - 3), Moderate Pain (4 - 6), Severe Pain (7 - 10)  ALBUTerol    90 MICROgram(s) HFA Inhaler 1 Puff(s) Inhalation every 6 hours PRN Shortness of Breath and/or Wheezing      FAMILY HISTORY:  No pertinent family history in first degree relatives      SOCIAL HISTORY:    CIGARETTES: no  DRUGS:  no  ALCOHOL:  occasional    REVIEW OF SYSTEMS:    CONSTITUTIONAL: No fever, weight loss, chills, shakes, or fatigue  EYES: No eye pain, visual disturbances, or discharge  ENMT:  No difficulty hearing, tinnitus, vertigo; No sinus or throat pain  NECK: No pain or stiffness  BREASTS: No pain, masses, or nipple discharge  RESPIRATORY: No cough, wheezing, hemoptysis, or shortness of breath  CARDIOVASCULAR:+ chest pain without dyspnea, palpitations, dizziness, syncope, paroxysmal nocturnal dyspnea, orthopnea, or arm or leg swelling  GASTROINTESTINAL: No abdominal  or epigastric pain, nausea, vomiting, hematemesis, diarrhea, constipation, melena or bright red blood.  GENITOURINARY: No dysuria, nocturia, hematuria, or urinary incontinence  NEUROLOGICAL: + headaches with slurred speech, right facial droop, expressive aphasia  Numbness involving right fingers.  SKIN: No itching, burning, rashes, or lesions   LYMPH NODES: No enlarged glands  ENDOCRINE: No heat or cold intolerance, or hair loss  MUSCULOSKELETAL: No joint pain or swelling, muscle, back, or extremity pain  PSYCHIATRIC: No depression, anxiety, or difficulty sleeping  HEME/LYMPH: No easy bruising or bleeding gums  ALLERGY AND IMMUNOLOGIC: No hives or rash.      Vital Signs Last 24 Hrs  T(C): 36.6 (21 Nov 2018 12:34), Max: 37 (21 Nov 2018 09:30)  T(F): 97.9 (21 Nov 2018 12:34), Max: 98.6 (21 Nov 2018 09:30)  HR: 61 (21 Nov 2018 12:34) (61 - 72)  BP: 110/69 (21 Nov 2018 12:34) (105/63 - 126/70)  BP(mean): --  RR: 18 (21 Nov 2018 12:34) (17 - 18)  SpO2: 99% (21 Nov 2018 12:34) (98% - 100%)    PHYSICAL EXAM:    GENERAL: In no apparent distress, well nourished, and hydrated.  HEAD:  Atraumatic, Normocephalic  EYES: EOMI, PERRLA, conjunctiva and sclera clear  ENMT: No tonsillar erythema, exudates, or enlargement; Moist mucous membranes, Good dentition, No lesions  NECK: Supple and normal thyroid.  No JVD or carotid bruit.  Carotid pulse is 2+ bilaterally.  HEART: Regular rate and rhythm; No murmurs, rubs, or gallops.  PULMONARY: Clear to auscultation and perfusion.  No rales, wheezing, or rhonchi bilaterally.  ABDOMEN: Soft, Nontender, Nondistended; Bowel sounds present  EXTREMITIES:  2+ Peripheral Pulses, No clubbing, cyanosis, or edema  LYMPH: No lymphadenopathy noted  NEUROLOGICAL: Grossly nonfocal          INTERPRETATION OF TELEMETRY:  NSR  NO atrial arrhythmias    ECG: Normal sinus rhythm 71 bpm.  LBBB (old)        20 Nov 2018 07:01  -  21 Nov 2018 07:00  LABS:                        12.7   6.39  )-----------( 268      ( 21 Nov 2018 05:10 )             39.3     11-21    141  |  106  |  8   ----------------------------<  105<H>  3.7   |  26  |  0.78    Ca    8.8      21 Nov 2018 05:10  Mg     1.9     11-21      RADIOLOGY & ADDITIONAL STUDIES:  PREVIOUS DIAGNOSTIC TESTING:      ECHO  FINDINGS:  < from: Transthoracic Echocardiogram (11.20.18 @ 22:16) >  Ejection Fraction (Modified Wright Rule): 51 %  ------------------------------------------------------------------------  OBSERVATIONS:  Mitral Valve: Normal mitral valve. Minimal mitral  regurgitation.  Aortic Root: Normal aortic root.  Aortic Valve: Normal trileaflet aortic valve.  Left Atrium: Normal left atrium.  LA volume index = 30  cc/m2.  Left Ventricle: Low normal left ventricular systolic  function. No segmental wall motion abnormalities. Septal  motion is consistent with LBBB. Moderate concentric left  ventricular hypertrophy.  Right Heart: Normal right atrium. Normal right ventricular  size and function. Normal tricuspid valve. Normal pulmonic  valve.  Pericardium/PleuraNormal pericardium with trace pericardial  effusion.  Hemodynamic: Agitated saline injection demonstrates no  obcious evidence of a patent foramen ovale.  ------------------------------------------------------------------------  CONCLUSIONS:  1. Normaltrileaflet aortic valve.  2. Moderate concentric left ventricular hypertrophy.  3. Low normal left ventricular systolic function. No  segmental wall motion abnormalities. Septal motion is  consistent with LBBB.  4. Normal right ventricular size and function.  5. Agitated saline injection demonstrates no obcious  < from: Transthoracic Echocardiogram (11.20.18 @ 22:16) >  evidence of a patent foramen ovale.  6. Normal pericardium with trace pericardial effusion.  ------------------------------------------------------------------------  Confirmed on  11/20/2018 - 14:31:27 by Rhianna Fuentes M.D. [facility]PV[facility]  ------------------------------------------------------------------------

## 2018-11-21 NOTE — PROGRESS NOTE ADULT - ASSESSMENT
58yo Female hx of MI, CHF, Asthma, recent pericardial effusion (observation only, f/u every 6months), known LBBB p/w CP and lightheadedness, found to have new expressive dysphagia likely d/t  TIA

## 2018-11-22 ENCOUNTER — TRANSCRIPTION ENCOUNTER (OUTPATIENT)
Age: 58
End: 2018-11-22

## 2018-11-22 VITALS — WEIGHT: 176.37 LBS

## 2018-11-22 LAB
BUN SERPL-MCNC: 9 MG/DL — SIGNIFICANT CHANGE UP (ref 7–23)
CALCIUM SERPL-MCNC: 9 MG/DL — SIGNIFICANT CHANGE UP (ref 8.4–10.5)
CHLORIDE SERPL-SCNC: 107 MMOL/L — SIGNIFICANT CHANGE UP (ref 98–107)
CO2 SERPL-SCNC: 25 MMOL/L — SIGNIFICANT CHANGE UP (ref 22–31)
CREAT SERPL-MCNC: 0.83 MG/DL — SIGNIFICANT CHANGE UP (ref 0.5–1.3)
GLUCOSE SERPL-MCNC: 98 MG/DL — SIGNIFICANT CHANGE UP (ref 70–99)
HCT VFR BLD CALC: 38.1 % — SIGNIFICANT CHANGE UP (ref 34.5–45)
HGB BLD-MCNC: 12.1 G/DL — SIGNIFICANT CHANGE UP (ref 11.5–15.5)
MAGNESIUM SERPL-MCNC: 1.9 MG/DL — SIGNIFICANT CHANGE UP (ref 1.6–2.6)
MCHC RBC-ENTMCNC: 27.6 PG — SIGNIFICANT CHANGE UP (ref 27–34)
MCHC RBC-ENTMCNC: 31.8 % — LOW (ref 32–36)
MCV RBC AUTO: 86.8 FL — SIGNIFICANT CHANGE UP (ref 80–100)
NRBC # FLD: 0 — SIGNIFICANT CHANGE UP
PLATELET # BLD AUTO: 291 K/UL — SIGNIFICANT CHANGE UP (ref 150–400)
PMV BLD: 9.3 FL — SIGNIFICANT CHANGE UP (ref 7–13)
POTASSIUM SERPL-MCNC: 3.9 MMOL/L — SIGNIFICANT CHANGE UP (ref 3.5–5.3)
POTASSIUM SERPL-SCNC: 3.9 MMOL/L — SIGNIFICANT CHANGE UP (ref 3.5–5.3)
RBC # BLD: 4.39 M/UL — SIGNIFICANT CHANGE UP (ref 3.8–5.2)
RBC # FLD: 13 % — SIGNIFICANT CHANGE UP (ref 10.3–14.5)
SODIUM SERPL-SCNC: 142 MMOL/L — SIGNIFICANT CHANGE UP (ref 135–145)
WBC # BLD: 6.69 K/UL — SIGNIFICANT CHANGE UP (ref 3.8–10.5)
WBC # FLD AUTO: 6.69 K/UL — SIGNIFICANT CHANGE UP (ref 3.8–10.5)

## 2018-11-22 PROCEDURE — 99239 HOSP IP/OBS DSCHRG MGMT >30: CPT

## 2018-11-22 PROCEDURE — 99232 SBSQ HOSP IP/OBS MODERATE 35: CPT

## 2018-11-22 PROCEDURE — 70547 MR ANGIOGRAPHY NECK W/O DYE: CPT | Mod: 26

## 2018-11-22 RX ORDER — ASPIRIN/CALCIUM CARB/MAGNESIUM 324 MG
1 TABLET ORAL
Qty: 0 | Refills: 0 | COMMUNITY

## 2018-11-22 RX ORDER — ALBUTEROL 90 UG/1
1 AEROSOL, METERED ORAL
Qty: 0 | Refills: 0 | DISCHARGE
Start: 2018-11-22

## 2018-11-22 RX ORDER — CLOPIDOGREL BISULFATE 75 MG/1
1 TABLET, FILM COATED ORAL
Qty: 30 | Refills: 0
Start: 2018-11-22

## 2018-11-22 RX ORDER — BUDESONIDE AND FORMOTEROL FUMARATE DIHYDRATE 160; 4.5 UG/1; UG/1
2 AEROSOL RESPIRATORY (INHALATION)
Qty: 0 | Refills: 0 | COMMUNITY

## 2018-11-22 RX ORDER — BUDESONIDE AND FORMOTEROL FUMARATE DIHYDRATE 160; 4.5 UG/1; UG/1
2 AEROSOL RESPIRATORY (INHALATION)
Qty: 1 | Refills: 0
Start: 2018-11-22

## 2018-11-22 RX ORDER — ASPIRIN/CALCIUM CARB/MAGNESIUM 324 MG
1 TABLET ORAL
Qty: 30 | Refills: 0
Start: 2018-11-22

## 2018-11-22 RX ORDER — ALBUTEROL 90 UG/1
2 AEROSOL, METERED ORAL
Qty: 0 | Refills: 0 | COMMUNITY

## 2018-11-22 RX ORDER — ATORVASTATIN CALCIUM 80 MG/1
1 TABLET, FILM COATED ORAL
Qty: 30 | Refills: 0
Start: 2018-11-22

## 2018-11-22 RX ORDER — LISINOPRIL 2.5 MG/1
1 TABLET ORAL
Qty: 0 | Refills: 0 | COMMUNITY

## 2018-11-22 RX ADMIN — Medication 81 MILLIGRAM(S): at 12:42

## 2018-11-22 RX ADMIN — CLOPIDOGREL BISULFATE 75 MILLIGRAM(S): 75 TABLET, FILM COATED ORAL at 12:42

## 2018-11-22 RX ADMIN — ENOXAPARIN SODIUM 40 MILLIGRAM(S): 100 INJECTION SUBCUTANEOUS at 12:41

## 2018-11-22 NOTE — PROGRESS NOTE ADULT - ASSESSMENT
This a 58 yo female with past medical history  of CAD s/p MI 2010, congestive heart failure, recent pericardial effusion, known LBBB and asthma who presented with chest discomfort, headache, lightheadedness, right hand tingling, right facial droop and expressive aphasia. Head CT and MRI brain negaitve for acute CVA. Symptoms completely resolved within 24 hours. No EKG or telemetry evidence of atrial fibrillation/PAF.  She would benefit from prolonged monitoring for detection of PAF/AFib as cause of cardioembolic source of her TIA. She is now s/p ILR implantation and doing well.  She will be called by our  on Friday to schedule an outpatient follow-up appointment.    John Barfield  Cardiology Fellow

## 2018-11-22 NOTE — DISCHARGE NOTE ADULT - CARE PLAN
Principal Discharge DX:	TIA (transient ischemic attack)  Goal:	stroke free  Assessment and plan of treatment:	take meds as prescribed  monitor Blood pressure  follow up with PCP / cardiology

## 2018-11-22 NOTE — DISCHARGE NOTE ADULT - PATIENT PORTAL LINK FT
You can access the [facility] Patient Portal, offered by [facility], by registering with the following website: [email or web address]

## 2018-11-22 NOTE — PROGRESS NOTE ADULT - SUBJECTIVE AND OBJECTIVE BOX
Interval Events:    S/P ILR implantation. Feels well, minor discomfort o/n but resolved at current. No swelling at procedure site.    ROS: Denies HA/dizziness/CP/SOB/palpitations/LE edema/abd pain    MEDICATIONS:  aspirin  chewable 81 milliGRAM(s) Oral daily  clopidogrel Tablet 75 milliGRAM(s) Oral daily  enoxaparin Injectable 40 milliGRAM(s) SubCutaneous daily  ALBUTerol    90 MICROgram(s) HFA Inhaler 1 Puff(s) Inhalation every 6 hours PRN  buDESOnide  80 MICROgram(s)/formoterol 4.5 MICROgram(s) Inhaler 2 Puff(s) Inhalation two times a day  acetaminophen   Tablet .. 650 milliGRAM(s) Oral every 6 hours PRN  atorvastatin 80 milliGRAM(s) Oral at bedtime  dextrose 5% + lactated ringers. 1000 milliLiter(s) IV Continuous <Continuous>      PHYSICAL EXAM:  T(C): 37.1 (11-22-18 @ 04:30), Max: 37.1 (11-22-18 @ 04:30)  HR: 65 (11-22-18 @ 04:30) (61 - 69)  BP: 113/64 (11-22-18 @ 04:30) (105/63 - 132/81)  RR: 18 (11-22-18 @ 04:30) (17 - 18)  SpO2: 99% (11-22-18 @ 04:30) (99% - 100%)  Wt(kg): --  I&O's Summary    21 Nov 2018 07:01  -  22 Nov 2018 07:00  --------------------------------------------------------  IN: 650 mL / OUT: 500 mL / NET: 150 mL        Appearance: No Acute Distress  Cardiovascular: Normal S1 S2, RRR, chest wall dressing C/D/I, no tenderness  Respiratory: Clear to auscultation  Gastrointestinal: Soft, Non-tender	  Neurologic: Non-focal  Extremities: No edema  Psychiatry: A & O x 3, Mood & affect appropriate    LABS:	 	    CBC Full  -  ( 22 Nov 2018 05:52 )  WBC Count : 6.69 K/uL  Hemoglobin : 12.1 g/dL  Hematocrit : 38.1 %  Platelet Count - Automated : 291 K/uL  Mean Cell Volume : 86.8 fL  Mean Cell Hemoglobin : 27.6 pg  Mean Cell Hemoglobin Concentration : 31.8 %  Auto Neutrophil # : x  Auto Lymphocyte # : x  Auto Monocyte # : x  Auto Eosinophil # : x  Auto Basophil # : x  Auto Neutrophil % : x  Auto Lymphocyte % : x  Auto Monocyte % : x  Auto Eosinophil % : x  Auto Basophil % : x    11-22    142  |  107  |  9   ----------------------------<  98  3.9   |  25  |  0.83  11-21    141  |  106  |  8   ----------------------------<  105<H>  3.7   |  26  |  0.78    Ca    9.0      22 Nov 2018 05:52  Ca    8.8      21 Nov 2018 05:10  Mg     1.9     11-22  Mg     1.9     11-21        TELEMETRY: NSR

## 2018-11-22 NOTE — PROGRESS NOTE ADULT - PROBLEM SELECTOR PLAN 2
chronic diastolic CHF, no e/o overt fluid overload   -TTE reviewed, LVEF wnl   -holding b-blocker and arb as per CVA protocol (permissive hypertension)

## 2018-11-22 NOTE — PROGRESS NOTE ADULT - ASSESSMENT
56yo Female hx of MI, CHF, Asthma, recent pericardial effusion (observation only, f/u every 6months), known LBBB p/w CP and lightheadedness, found to have new expressive dysphagia likely d/t  TIA

## 2018-11-22 NOTE — DISCHARGE NOTE ADULT - MEDICATION SUMMARY - MEDICATIONS TO CHANGE
I will SWITCH the dose or number of times a day I take the medications listed below when I get home from the hospital:    lisinopril 10 mg oral tablet  -- 1 tab(s) by mouth 2 times a day

## 2018-11-22 NOTE — DISCHARGE NOTE ADULT - HOSPITAL COURSE
56yo Female hx of MI, CHF, Asthma, recent pericardial effusion (observation only, f/u every 6months), known LBBB presenting with chest pain and lightheadedness starting at 12:30pm on day of admission who later developed expressive aphasia. During discussion with pt, no complaints of CP/SOB; pt also denying recent weight gain, LE edema, or other cardiac-related sx. Per pt, stated that she was in her [employer] when she began to have some chest tightness around 12:30, for which she took a baby aspirin and felt belt.  She was in [employer] today and when she came home, she had a headache around 4 PM, which she also had during the interview; HA is in L occipito-parietal region, 6-7/10 w/o radiation, mild light sensitivity but denies n/v/phonophobia; pt does not have a hx of HA.  Pt was then face-timing with her sister when she began having difficulty getting words out; her sister was very concerned given her cardiac hx and suggested she come into the ED.  In the ED, Neurology was consulted for sudden change in speech. Last known well 1600. . In the ED, Code Stroke code, patient with loss of speech fluency and repetition deficits, yet retained understanding. Pt refused tPA after risk/benefit discussion.    Per discussion with pt, she has no toxic habits and only drinks socially; no cigarettes ever smoked or any hx of illicit drugs. Pt denies any pertinent family hx of diabetes, stroke, or cardiac disease or CAD.  Pt is quite socially active attending concerts, plays and other social functions and works full-time as a manager in a senior citizens facility.  Denies numbness/weakness/headache/dizziness; no complaints of f/c/n/v/CP/SOB/LOC/abd pain/dysuria.  Of note, sates that takes Aspirin occasionally only.      In the ED:  Vital Signs Last 24 Hrs  T(C): 37.3 (11-18-18 @ 18:06), Max: 37.3 (11-18-18 @ 18:06)  T(F): 99.2 (11-18-18 @ 18:06), Max: 99.2 (11-18-18 @ 18:06)  HR: 87 (11-18-18 @ 20:54) (70 - 87)  BP: 135/75 (11-18-18 @ 20:54) (135/75 - 154/80)  RR: 16 (11-18-18 @ 20:54) (16 - 16)  SpO2: 100% (11-18-18 @ 20:54) (96% - 100%)    Received: Aspirin  x 1 dose     CE x 3 NEG ECG SINUS with LBBB OLD  CT HEAD no acute disease   11/19: MRA Neck w/o con: Study is degraded by lack of contrast and motion artifact. No evidence of significant carotid or vertebral stenosis or vascular occlusion.  MRA Brain: normal  MRI Brain: Normal noncontrast MRI of the brain. No evidence of acute cerebral ischemia.  CD 11/20 Summary/Impressions:  Mild heterogenous plaque noted within the proximal right  and left internal carotid arteries, consistent with 16-49%  stenoses in both proximal vessels.  No hemodynamically  significant stenoses noted.  11/20 EF 51% Echo CONCLUSIONS: 1. Normal trileaflet aortic valve. 2. Moderate concentric left ventricular hypertrophy. 3. Low normal left ventricular systolic function. No  segmental wall motion abnormalities. Septal motion is consistent with LBBB. 4. Normal right ventricular size and function. 5. Agitated saline injection demonstrates no obcious  evidence of a patent foramen ovale. 6. Normal pericardium with trace pericardial effusion.  11/20 Neck CTA: The visualized aorta and great vessels are unremarkable. The common carotid arteries are normal in appearance. The internal carotid arteries and external carotid arteries are patent. The vertebral arteries are patent.  Brain CTA: The distal internal carotid arteries are patent. The Lummi of Matias is normal in appearance. The visualized cerebral arteries are unremarkable. The vertebrobasilar junction and basilar artery are normal.  11/21 EP: s/p Loop implant  PT eval done, no PT needs  Pt was dc home per PMD  follow up with PCP Dr Denia Sethi  follow up with cardiology Dr Alexei Galloway  follow up with EP team s/p Loop recorder

## 2018-11-22 NOTE — DISCHARGE NOTE ADULT - NS AS DC STROKE ED MATERIALS
Need for Followup After Discharge/Stroke Education Booklet/Prescribed Medications/Stroke Warning Signs and Symptoms/Call 911 for Stroke/Risk Factors for Stroke

## 2018-11-22 NOTE — PROGRESS NOTE ADULT - SUBJECTIVE AND OBJECTIVE BOX
patient seen and examined at bed side   no acute issue   MR neck negative       MEDICATIONS  (STANDING):  aspirin  chewable 81 milliGRAM(s) Oral daily  atorvastatin 80 milliGRAM(s) Oral at bedtime  buDESOnide  80 MICROgram(s)/formoterol 4.5 MICROgram(s) Inhaler 2 Puff(s) Inhalation two times a day  clopidogrel Tablet 75 milliGRAM(s) Oral daily  dextrose 5% + lactated ringers. 1000 milliLiter(s) (50 mL/Hr) IV Continuous <Continuous>  enoxaparin Injectable 40 milliGRAM(s) SubCutaneous daily    MEDICATIONS  (PRN):  acetaminophen   Tablet .. 650 milliGRAM(s) Oral every 6 hours PRN Mild Pain (1 - 3), Moderate Pain (4 - 6), Severe Pain (7 - 10)  ALBUTerol    90 MICROgram(s) HFA Inhaler 1 Puff(s) Inhalation every 6 hours PRN Shortness of Breath and/or Wheezing      Vital Signs Last 24 Hrs  T(C): 36.8 (22 Nov 2018 12:55), Max: 37.1 (22 Nov 2018 04:30)  T(F): 98.2 (22 Nov 2018 12:55), Max: 98.7 (22 Nov 2018 04:30)  HR: 64 (22 Nov 2018 12:55) (63 - 69)  BP: 125/66 (22 Nov 2018 12:55) (113/64 - 132/81)  BP(mean): --  RR: 18 (22 Nov 2018 12:55) (17 - 18)  SpO2: 100% (22 Nov 2018 12:55) (99% - 100%)        GENERAL: appearing  female lying in bed in NAD   MENTAL STATUS/PSYCH:  AAO x3   HEAD:  Atraumatic, Normocephalic  EYES: EOMI, PERRLA, conjunctiva and sclera clear  NECK: Supple, No elevated JVD  CHEST/LUNG: Clear to auscultation bilaterally; No wheeze  HEART: Regular rate and rhythm; No murmurs, rubs, or gallops  ABDOMEN: Soft, Nontender, Nondistended; Bowel sounds present  EXTREMITIES:  2+ Peripheral Pulses, No clubbing, cyanosis, or edema  NEUROLOGY: CN II-XII grossly intact, moving all extremities  SKIN: No rashes or lesions    LABS:  CAPILLARY BLOOD GLUCOSE                                           12.1   6.69  )-----------( 291      ( 22 Nov 2018 05:52 )             38.1       11-22    142  |  107  |  9   ----------------------------<  98  3.9   |  25  |  0.83    Ca    9.0      22 Nov 2018 05:52  Mg     1.9     11-22          RADIOLOGY & ADDITIONAL TESTS:    Imaging Personally Reviewed:    CTA head/neck- no significant stenosis     Consultant(s) Notes Reviewed:  neurology     Care Discussed with Consultants/Other Providers: neurology

## 2018-11-22 NOTE — DISCHARGE NOTE ADULT - MEDICATION SUMMARY - MEDICATIONS TO TAKE
I will START or STAY ON the medications listed below when I get home from the hospital:    Aspirin Enteric Coated 81 mg oral delayed release tablet  -- 1 tab(s) by mouth once a day  -- Indication: For Heart / brain    lisinopril 10 mg oral tablet  -- 1 tab(s) by mouth once a day  -- Indication: For blood pressure     atorvastatin 80 mg oral tablet  -- 1 tab(s) by mouth once a day (at bedtime)  -- Indication: For Heart / brain    clopidogrel 75 mg oral tablet  -- 1 tab(s) by mouth once a day  -- Indication: For Heart    Metoprolol Succinate ER  -- 25 milligram(s) by mouth once a day  -- Indication: For HTN    albuterol 90 mcg/inh inhalation aerosol  -- 1 puff(s) inhaled every 6 hours, As needed, Shortness of Breath and/or Wheezing  -- Indication: For inhaler for lungs    Symbicort 80 mcg-4.5 mcg/inh inhalation aerosol  -- 2 puff(s) inhaled 2 times a day  -- Indication: For Lungs

## 2018-11-22 NOTE — DISCHARGE NOTE ADULT - CARE PROVIDER_API CALL
Alexei Galloway), Cardiovascular Disease; Internal Medicine  [address] [place] [address]  Phone: [phone]  Fax: [phone]    Denia Sethi), Internal Medicine  [address] [place] [address]  Phone: [phone]  Fax: [phone]

## 2018-11-22 NOTE — DISCHARGE NOTE ADULT - ADDITIONAL INSTRUCTIONS
follow up with PCP Dr Denia Sethi  follow up with cardiology Dr Alexei Galloway  follow up with EP team s/p Loop recorder

## 2018-11-22 NOTE — DISCHARGE NOTE ADULT - CARE PROVIDERS DIRECT ADDRESSES
[email or web address]

## 2018-11-26 PROBLEM — Z00.00 ENCOUNTER FOR PREVENTIVE HEALTH EXAMINATION: Status: ACTIVE | Noted: 2018-11-26

## 2018-11-26 PROBLEM — I44.7 LEFT BUNDLE-BRANCH BLOCK, UNSPECIFIED: Chronic | Status: ACTIVE | Noted: 2018-11-19

## 2018-12-07 ENCOUNTER — APPOINTMENT (OUTPATIENT)
Dept: ELECTROPHYSIOLOGY | Facility: CLINIC | Age: 58
End: 2018-12-07
Payer: COMMERCIAL

## 2018-12-07 VITALS — SYSTOLIC BLOOD PRESSURE: 107 MMHG | DIASTOLIC BLOOD PRESSURE: 71 MMHG | HEART RATE: 74 BPM | RESPIRATION RATE: 14 BRPM

## 2018-12-07 DIAGNOSIS — I50.9 HEART FAILURE, UNSPECIFIED: ICD-10-CM

## 2018-12-07 DIAGNOSIS — J45.909 UNSPECIFIED ASTHMA, UNCOMPLICATED: ICD-10-CM

## 2018-12-07 DIAGNOSIS — I25.2 OLD MYOCARDIAL INFARCTION: ICD-10-CM

## 2018-12-07 DIAGNOSIS — I44.7 LEFT BUNDLE-BRANCH BLOCK, UNSPECIFIED: ICD-10-CM

## 2018-12-07 PROCEDURE — 99024 POSTOP FOLLOW-UP VISIT: CPT

## 2018-12-07 RX ORDER — BUDESONIDE AND FORMOTEROL FUMARATE DIHYDRATE 80; 4.5 UG/1; UG/1
80-4.5 AEROSOL RESPIRATORY (INHALATION) TWICE DAILY
Refills: 0 | Status: ACTIVE | COMMUNITY

## 2018-12-07 RX ORDER — ALBUTEROL 90 MCG
90 AEROSOL (GRAM) INHALATION
Refills: 0 | Status: ACTIVE | COMMUNITY

## 2018-12-11 ENCOUNTER — APPOINTMENT (OUTPATIENT)
Dept: NEUROLOGY | Facility: CLINIC | Age: 58
End: 2018-12-11
Payer: COMMERCIAL

## 2018-12-11 VITALS
DIASTOLIC BLOOD PRESSURE: 71 MMHG | HEART RATE: 70 BPM | SYSTOLIC BLOOD PRESSURE: 118 MMHG | BODY MASS INDEX: 25.27 KG/M2 | HEIGHT: 67 IN | WEIGHT: 161 LBS

## 2018-12-11 PROCEDURE — 99215 OFFICE O/P EST HI 40 MIN: CPT

## 2018-12-14 ENCOUNTER — MESSAGE (OUTPATIENT)
Age: 58
End: 2018-12-14

## 2019-01-08 ENCOUNTER — APPOINTMENT (OUTPATIENT)
Dept: ELECTROPHYSIOLOGY | Facility: CLINIC | Age: 59
End: 2019-01-08
Payer: COMMERCIAL

## 2019-01-08 PROCEDURE — 93299: CPT

## 2019-01-08 PROCEDURE — 93298 REM INTERROG DEV EVAL SCRMS: CPT

## 2019-01-11 ENCOUNTER — MESSAGE (OUTPATIENT)
Age: 59
End: 2019-01-11

## 2019-02-20 ENCOUNTER — APPOINTMENT (OUTPATIENT)
Dept: NEUROLOGY | Facility: CLINIC | Age: 59
End: 2019-02-20

## 2019-02-20 ENCOUNTER — APPOINTMENT (OUTPATIENT)
Dept: ELECTROPHYSIOLOGY | Facility: CLINIC | Age: 59
End: 2019-02-20
Payer: COMMERCIAL

## 2019-02-20 PROCEDURE — 93299: CPT

## 2019-02-20 PROCEDURE — 93298 REM INTERROG DEV EVAL SCRMS: CPT

## 2019-03-19 ENCOUNTER — NON-APPOINTMENT (OUTPATIENT)
Age: 59
End: 2019-03-19

## 2019-03-19 ENCOUNTER — APPOINTMENT (OUTPATIENT)
Dept: ELECTROPHYSIOLOGY | Facility: CLINIC | Age: 59
End: 2019-03-19
Payer: COMMERCIAL

## 2019-03-19 VITALS
HEART RATE: 65 BPM | DIASTOLIC BLOOD PRESSURE: 74 MMHG | HEIGHT: 67 IN | SYSTOLIC BLOOD PRESSURE: 119 MMHG | OXYGEN SATURATION: 99 % | WEIGHT: 168 LBS | BODY MASS INDEX: 26.37 KG/M2

## 2019-03-19 DIAGNOSIS — R00.0 TACHYCARDIA, UNSPECIFIED: ICD-10-CM

## 2019-03-19 PROCEDURE — 93000 ELECTROCARDIOGRAM COMPLETE: CPT

## 2019-03-19 PROCEDURE — 99213 OFFICE O/P EST LOW 20 MIN: CPT

## 2019-03-19 RX ORDER — CLOPIDOGREL 75 MG/1
75 TABLET, FILM COATED ORAL
Refills: 0 | Status: DISCONTINUED | COMMUNITY
End: 2019-03-19

## 2019-03-19 RX ORDER — LISINOPRIL 10 MG/1
10 TABLET ORAL
Qty: 90 | Refills: 1 | Status: ACTIVE | COMMUNITY

## 2019-03-19 RX ORDER — METOPROLOL SUCCINATE 50 MG/1
50 TABLET, EXTENDED RELEASE ORAL DAILY
Refills: 0 | Status: ACTIVE | COMMUNITY

## 2019-03-19 NOTE — HISTORY OF PRESENT ILLNESS
[FreeTextEntry1] : Alexei Galloway MD\par \par I saw Coco Mendoza on March 19, 2019. As you know, she is a 59y/o woman with Hx of HTN, CAD s/p MI (2010), asthma, and TIA s/p ILR placement (2018) who presents today for initial evaluation. Admits episode of feeling dizzy last week and sent remote of ILR which revealed no evidence of tachy or bradyarrhythmias associated with symptoms. Also notes occasional episodes of feeling fluttering. Denies any sustained symptoms. Denies chest pain, palpitations, SOB, syncope or near syncope. \par

## 2019-03-19 NOTE — PHYSICAL EXAM
[General Appearance - Well Developed] : well developed [Normal Appearance] : normal appearance [Well Groomed] : well groomed [General Appearance - Well Nourished] : well nourished [No Deformities] : no deformities [General Appearance - In No Acute Distress] : no acute distress [Normal Conjunctiva] : the conjunctiva exhibited no abnormalities [Eyelids - No Xanthelasma] : the eyelids demonstrated no xanthelasmas [Normal Oral Mucosa] : normal oral mucosa [No Oral Pallor] : no oral pallor [No Oral Cyanosis] : no oral cyanosis [Normal Jugular Venous A Waves Present] : normal jugular venous A waves present [Normal Jugular Venous V Waves Present] : normal jugular venous V waves present [No Jugular Venous Schuler A Waves] : no jugular venous schuler A waves [Heart Rate And Rhythm] : heart rate and rhythm were normal [Heart Sounds] : normal S1 and S2 [Murmurs] : no murmurs present [Respiration, Rhythm And Depth] : normal respiratory rhythm and effort [Exaggerated Use Of Accessory Muscles For Inspiration] : no accessory muscle use [Auscultation Breath Sounds / Voice Sounds] : lungs were clear to auscultation bilaterally [Abdomen Soft] : soft [Abdomen Tenderness] : non-tender [Abdomen Mass (___ Cm)] : no abdominal mass palpated [Abnormal Walk] : normal gait [Gait - Sufficient For Exercise Testing] : the gait was sufficient for exercise testing [Nail Clubbing] : no clubbing of the fingernails [Cyanosis, Localized] : no localized cyanosis [Petechial Hemorrhages (___cm)] : no petechial hemorrhages [Skin Color & Pigmentation] : normal skin color and pigmentation [] : no rash [No Venous Stasis] : no venous stasis [Skin Lesions] : no skin lesions [No Skin Ulcers] : no skin ulcer [No Xanthoma] : no  xanthoma was observed [Oriented To Time, Place, And Person] : oriented to person, place, and time [Affect] : the affect was normal [Mood] : the mood was normal [No Anxiety] : not feeling anxious

## 2019-03-19 NOTE — DISCUSSION/SUMMARY
[FreeTextEntry1] : In summary,  Coco Mendoza is a 59y/o woman with Hx of HTN, CAD s/p MI (2010), asthma, and TIA s/p ILR placement (2018) who presents today for initial evaluation. Admits episode of feeling dizzy last week and sent remote of ILR which revealed no evidence of tachy or bradyarrhythmias associated with symptoms. Also notes occasional episodes of feeling fluttering. Denies any sustained symptoms. Denies chest pain, palpitations, SOB, syncope or near syncope. EKG today NSR. Reviewing all ILR interrogations, brief 2-6 min episodes of "atrial fibrillation" are very regular and appear to be sinus tachycardia to possible AT. Was exercising on a bike at that time which she has not done recently. No recent episodes of tachycardia noted. Could consider increasing metoprolol to 100mg daily but concerned about side effects such as fatigue and asthma. Will resume 75mg daily at this time and continue routine monitoring of ILR. No evidence of afib and no need for AC at this time. \par \par Sincerely,\par \par Elias Johnson MD

## 2019-03-20 NOTE — SWALLOW BEDSIDE ASSESSMENT ADULT - ASR SWALLOW ASPIRATION MONITOR
change of breathing pattern/cough/position upright (90Y)/throat clearing/oral hygiene/fever/pneumonia/gurgly voice/upper respiratory infection no dyspnea/no cough/no wheezing

## 2019-03-25 ENCOUNTER — APPOINTMENT (OUTPATIENT)
Dept: ELECTROPHYSIOLOGY | Facility: CLINIC | Age: 59
End: 2019-03-25
Payer: COMMERCIAL

## 2019-03-25 PROCEDURE — 93298 REM INTERROG DEV EVAL SCRMS: CPT

## 2019-03-25 PROCEDURE — 93299: CPT

## 2019-04-25 ENCOUNTER — APPOINTMENT (OUTPATIENT)
Dept: ELECTROPHYSIOLOGY | Facility: CLINIC | Age: 59
End: 2019-04-25
Payer: COMMERCIAL

## 2019-04-25 PROCEDURE — 93299: CPT

## 2019-04-25 PROCEDURE — 93298 REM INTERROG DEV EVAL SCRMS: CPT

## 2019-04-28 ENCOUNTER — TRANSCRIPTION ENCOUNTER (OUTPATIENT)
Age: 59
End: 2019-04-28

## 2019-04-28 ENCOUNTER — EMERGENCY (EMERGENCY)
Facility: HOSPITAL | Age: 59
LOS: 1 days | Discharge: HOME | End: 2019-04-28
Attending: EMERGENCY MEDICINE | Admitting: EMERGENCY MEDICINE
Payer: COMMERCIAL

## 2019-04-28 VITALS
HEART RATE: 65 BPM | OXYGEN SATURATION: 100 % | TEMPERATURE: 99 F | DIASTOLIC BLOOD PRESSURE: 65 MMHG | SYSTOLIC BLOOD PRESSURE: 142 MMHG | RESPIRATION RATE: 18 BRPM

## 2019-04-28 LAB
ALBUMIN SERPL ELPH-MCNC: 4 G/DL — SIGNIFICANT CHANGE UP (ref 3.3–5)
ALP SERPL-CCNC: 69 U/L — SIGNIFICANT CHANGE UP (ref 40–120)
ALT FLD-CCNC: 24 U/L — SIGNIFICANT CHANGE UP (ref 4–33)
ANION GAP SERPL CALC-SCNC: 12 MMO/L — SIGNIFICANT CHANGE UP (ref 7–14)
AST SERPL-CCNC: 23 U/L — SIGNIFICANT CHANGE UP (ref 4–32)
BASOPHILS # BLD AUTO: 0.07 K/UL — SIGNIFICANT CHANGE UP (ref 0–0.2)
BASOPHILS NFR BLD AUTO: 1.1 % — SIGNIFICANT CHANGE UP (ref 0–2)
BILIRUB SERPL-MCNC: 1.1 MG/DL — SIGNIFICANT CHANGE UP (ref 0.2–1.2)
BUN SERPL-MCNC: 16 MG/DL — SIGNIFICANT CHANGE UP (ref 7–23)
CALCIUM SERPL-MCNC: 9.4 MG/DL — SIGNIFICANT CHANGE UP (ref 8.4–10.5)
CHLORIDE SERPL-SCNC: 107 MMOL/L — SIGNIFICANT CHANGE UP (ref 98–107)
CO2 SERPL-SCNC: 22 MMOL/L — SIGNIFICANT CHANGE UP (ref 22–31)
CREAT SERPL-MCNC: 0.89 MG/DL — SIGNIFICANT CHANGE UP (ref 0.5–1.3)
EOSINOPHIL # BLD AUTO: 0.05 K/UL — SIGNIFICANT CHANGE UP (ref 0–0.5)
EOSINOPHIL NFR BLD AUTO: 0.8 % — SIGNIFICANT CHANGE UP (ref 0–6)
GLUCOSE SERPL-MCNC: 106 MG/DL — HIGH (ref 70–99)
HCT VFR BLD CALC: 40.7 % — SIGNIFICANT CHANGE UP (ref 34.5–45)
HGB BLD-MCNC: 12.8 G/DL — SIGNIFICANT CHANGE UP (ref 11.5–15.5)
IMM GRANULOCYTES NFR BLD AUTO: 0.2 % — SIGNIFICANT CHANGE UP (ref 0–1.5)
LYMPHOCYTES # BLD AUTO: 2.82 K/UL — SIGNIFICANT CHANGE UP (ref 1–3.3)
LYMPHOCYTES # BLD AUTO: 45 % — HIGH (ref 13–44)
MCHC RBC-ENTMCNC: 28.5 PG — SIGNIFICANT CHANGE UP (ref 27–34)
MCHC RBC-ENTMCNC: 31.4 % — LOW (ref 32–36)
MCV RBC AUTO: 90.6 FL — SIGNIFICANT CHANGE UP (ref 80–100)
MONOCYTES # BLD AUTO: 0.44 K/UL — SIGNIFICANT CHANGE UP (ref 0–0.9)
MONOCYTES NFR BLD AUTO: 7 % — SIGNIFICANT CHANGE UP (ref 2–14)
NEUTROPHILS # BLD AUTO: 2.88 K/UL — SIGNIFICANT CHANGE UP (ref 1.8–7.4)
NEUTROPHILS NFR BLD AUTO: 45.9 % — SIGNIFICANT CHANGE UP (ref 43–77)
NRBC # FLD: 0 K/UL — SIGNIFICANT CHANGE UP (ref 0–0)
PLATELET # BLD AUTO: 243 K/UL — SIGNIFICANT CHANGE UP (ref 150–400)
PMV BLD: 9.3 FL — SIGNIFICANT CHANGE UP (ref 7–13)
POTASSIUM SERPL-MCNC: 4.1 MMOL/L — SIGNIFICANT CHANGE UP (ref 3.5–5.3)
POTASSIUM SERPL-SCNC: 4.1 MMOL/L — SIGNIFICANT CHANGE UP (ref 3.5–5.3)
PROT SERPL-MCNC: 6.9 G/DL — SIGNIFICANT CHANGE UP (ref 6–8.3)
RBC # BLD: 4.49 M/UL — SIGNIFICANT CHANGE UP (ref 3.8–5.2)
RBC # FLD: 13.2 % — SIGNIFICANT CHANGE UP (ref 10.3–14.5)
SODIUM SERPL-SCNC: 141 MMOL/L — SIGNIFICANT CHANGE UP (ref 135–145)
TROPONIN T, HIGH SENSITIVITY: < 6 NG/L — SIGNIFICANT CHANGE UP (ref ?–14)
WBC # BLD: 6.27 K/UL — SIGNIFICANT CHANGE UP (ref 3.8–10.5)
WBC # FLD AUTO: 6.27 K/UL — SIGNIFICANT CHANGE UP (ref 3.8–10.5)

## 2019-04-28 PROCEDURE — 70450 CT HEAD/BRAIN W/O DYE: CPT | Mod: 26

## 2019-04-28 PROCEDURE — 99284 EMERGENCY DEPT VISIT MOD MDM: CPT

## 2019-04-28 RX ORDER — SODIUM CHLORIDE 9 MG/ML
1000 INJECTION INTRAMUSCULAR; INTRAVENOUS; SUBCUTANEOUS ONCE
Qty: 0 | Refills: 0 | Status: COMPLETED | OUTPATIENT
Start: 2019-04-28 | End: 2019-04-28

## 2019-04-28 NOTE — ED PROVIDER NOTE - OBJECTIVE STATEMENT
57 y/o female with a PMHx of CHF, LBBB, CHF unknown EF, TIA, Presents to the ED complaining of having a episode of dizziness along with headache earlier today. patient states that the headache and dizziness occurred earlier today around 10:15am. States that she noticed it when she stood up at [employer]. Pt states that she felt the lightheadedness which lasted a few minutes and then resolved. Pt states that however her headache persisted. Pt states that she follows up with her cardiologist whom did blood work and was normal. Pt denies having any nausea, vomiting, diarrhea, CP, SOB, CARDENAS, leg swelling, abdominal pain.

## 2019-04-28 NOTE — ED PROVIDER NOTE - PMH
Asthma    CHF (congestive heart failure)    LBBB (left bundle branch block)    Myocardial infarction, unspecified MI type, unspecified artery

## 2019-04-28 NOTE — ED PROVIDER NOTE - NSFOLLOWUPINSTRUCTIONS_ED_ALL_ED_FT
Rest, drink plenty of fluids.  Advance activity as tolerated.  Continue all previously prescribed medications as directed.  Follow up with your primary care physician in 48-72 hours- bring copies of your results.  Return to the ER for worsening or persistent symptoms, and/or ANY NEW OR CONCERNING SYMPTOMS. If you have issues obtaining follow up, please call: [phone] to obtain a doctor or specialist who takes your insurance in your area.  You may call [phone] to make an appointment with the internal medicine clinic.

## 2019-04-28 NOTE — ED PROVIDER NOTE - CRANIAL NERVE AND PUPILLARY EXAM
central vision intact/peripheral vision intact/cranial nerves 2-12 intact/central and peripheral vision intact/extra-ocular movements intact/gag reflex intact/tongue is midline

## 2019-04-28 NOTE — ED PROVIDER NOTE - PROGRESS NOTE DETAILS
Spoke with cardiology whom looked at cardiac monitor and did not see any cardiac changes at the time of lightheadedness. Patient currently states that she is feeling well, and is going to follow up with cardiologist on thursday.

## 2019-04-28 NOTE — ED PROVIDER NOTE - ATTENDING CONTRIBUTION TO CARE
DR. CONNELL, ATTENDING MD-  I performed a face to face bedside interview with patient regarding history of present illness, review of symptoms and past medical history. I completed an independent physical exam.  I have discussed patient's plan of care with PA.   Documentation as above in the note.    57 y/o female h/o chf, asthma, tia, known lbbb, mi here with episode of lightheadedness at [employer] while going up the stairs.  Has implanted holter.  Discussed with pt's cardiologist who states nsr during episode at 11am today.  Denies f/c, ha, neck stiffness, cp, sob, cough, abd pain, n/v/d, dysuria, rash.  Afebrile, vs wnl, nad, dry mm, ctabil, s1s2 rrr no m/r/g, abd soft non ttp no r/g, no cva tenderness b/l, no leg swelling b/l, no rash.  Dehydration vs anemia vs atypical acs vs less likely tia.  Obtain cbc, bmp, trop, ct h, ekg, give ivf bolus, reassess.  Offered obs for echo, but prefers to f/u with cards for previously scheduled echo for Thursday, spoke with cards, he agrees with this plan if negative ed w/u.

## 2019-04-28 NOTE — ED ADULT TRIAGE NOTE - CHIEF COMPLAINT QUOTE
PT C/O Dizziness and headache starting this morning increased with standing. Denies N/V, vision changes. on 81 mg ASA daily. PMH: CHF, Asthma, TIA. Pt appears comfortable and in NAD.

## 2019-05-28 ENCOUNTER — APPOINTMENT (OUTPATIENT)
Dept: ELECTROPHYSIOLOGY | Facility: CLINIC | Age: 59
End: 2019-05-28
Payer: COMMERCIAL

## 2019-05-28 PROCEDURE — 93299: CPT

## 2019-05-28 PROCEDURE — 93298 REM INTERROG DEV EVAL SCRMS: CPT

## 2019-06-07 ENCOUNTER — APPOINTMENT (OUTPATIENT)
Dept: ELECTROPHYSIOLOGY | Facility: CLINIC | Age: 59
End: 2019-06-07
Payer: COMMERCIAL

## 2019-06-07 PROCEDURE — 93285 PRGRMG DEV EVAL SCRMS IP: CPT

## 2019-06-07 RX ORDER — ATORVASTATIN CALCIUM 20 MG/1
20 TABLET, FILM COATED ORAL
Refills: 0 | Status: ACTIVE | COMMUNITY

## 2019-07-11 ENCOUNTER — APPOINTMENT (OUTPATIENT)
Dept: ELECTROPHYSIOLOGY | Facility: CLINIC | Age: 59
End: 2019-07-11
Payer: COMMERCIAL

## 2019-07-11 PROCEDURE — 93298 REM INTERROG DEV EVAL SCRMS: CPT

## 2019-07-11 PROCEDURE — 93299: CPT

## 2019-08-13 ENCOUNTER — APPOINTMENT (OUTPATIENT)
Dept: ELECTROPHYSIOLOGY | Facility: CLINIC | Age: 59
End: 2019-08-13
Payer: COMMERCIAL

## 2019-08-13 PROCEDURE — 93298 REM INTERROG DEV EVAL SCRMS: CPT

## 2019-08-13 PROCEDURE — 93299: CPT

## 2019-09-18 ENCOUNTER — APPOINTMENT (OUTPATIENT)
Dept: ELECTROPHYSIOLOGY | Facility: CLINIC | Age: 59
End: 2019-09-18
Payer: COMMERCIAL

## 2019-09-18 PROCEDURE — 93298 REM INTERROG DEV EVAL SCRMS: CPT

## 2019-09-18 PROCEDURE — 93299: CPT

## 2019-10-21 ENCOUNTER — APPOINTMENT (OUTPATIENT)
Dept: ELECTROPHYSIOLOGY | Facility: CLINIC | Age: 59
End: 2019-10-21
Payer: COMMERCIAL

## 2019-10-21 PROCEDURE — 93299: CPT

## 2019-10-21 PROCEDURE — 93298 REM INTERROG DEV EVAL SCRMS: CPT

## 2019-11-21 ENCOUNTER — APPOINTMENT (OUTPATIENT)
Dept: ELECTROPHYSIOLOGY | Facility: CLINIC | Age: 59
End: 2019-11-21
Payer: COMMERCIAL

## 2019-11-21 PROCEDURE — 93299: CPT

## 2019-11-21 PROCEDURE — 93298 REM INTERROG DEV EVAL SCRMS: CPT

## 2019-12-10 ENCOUNTER — APPOINTMENT (OUTPATIENT)
Dept: ELECTROPHYSIOLOGY | Facility: CLINIC | Age: 59
End: 2019-12-10
Payer: COMMERCIAL

## 2019-12-10 DIAGNOSIS — G45.9 TRANSIENT CEREBRAL ISCHEMIC ATTACK, UNSPECIFIED: ICD-10-CM

## 2019-12-10 PROCEDURE — 93285 PRGRMG DEV EVAL SCRMS IP: CPT

## 2020-01-10 ENCOUNTER — APPOINTMENT (OUTPATIENT)
Dept: ELECTROPHYSIOLOGY | Facility: CLINIC | Age: 60
End: 2020-01-10
Payer: COMMERCIAL

## 2020-01-10 PROCEDURE — 93298 REM INTERROG DEV EVAL SCRMS: CPT

## 2020-01-10 PROCEDURE — G2066: CPT

## 2020-02-11 ENCOUNTER — APPOINTMENT (OUTPATIENT)
Dept: ELECTROPHYSIOLOGY | Facility: CLINIC | Age: 60
End: 2020-02-11
Payer: COMMERCIAL

## 2020-02-11 PROCEDURE — G2066: CPT

## 2020-02-11 PROCEDURE — 93298 REM INTERROG DEV EVAL SCRMS: CPT

## 2020-03-11 ENCOUNTER — RESULT REVIEW (OUTPATIENT)
Age: 60
End: 2020-03-11

## 2020-03-13 ENCOUNTER — APPOINTMENT (OUTPATIENT)
Dept: ELECTROPHYSIOLOGY | Facility: CLINIC | Age: 60
End: 2020-03-13
Payer: COMMERCIAL

## 2020-03-13 PROCEDURE — 93298 REM INTERROG DEV EVAL SCRMS: CPT

## 2020-03-13 PROCEDURE — G2066: CPT

## 2020-04-13 ENCOUNTER — APPOINTMENT (OUTPATIENT)
Dept: ELECTROPHYSIOLOGY | Facility: CLINIC | Age: 60
End: 2020-04-13
Payer: COMMERCIAL

## 2020-04-13 PROCEDURE — G2066: CPT

## 2020-04-13 PROCEDURE — 93298 REM INTERROG DEV EVAL SCRMS: CPT

## 2020-05-14 ENCOUNTER — APPOINTMENT (OUTPATIENT)
Dept: ELECTROPHYSIOLOGY | Facility: CLINIC | Age: 60
End: 2020-05-14
Payer: COMMERCIAL

## 2020-05-14 PROCEDURE — G2066: CPT

## 2020-05-14 PROCEDURE — 93298 REM INTERROG DEV EVAL SCRMS: CPT

## 2020-06-09 ENCOUNTER — APPOINTMENT (OUTPATIENT)
Dept: ELECTROPHYSIOLOGY | Facility: CLINIC | Age: 60
End: 2020-06-09

## 2020-06-17 ENCOUNTER — APPOINTMENT (OUTPATIENT)
Dept: ELECTROPHYSIOLOGY | Facility: CLINIC | Age: 60
End: 2020-06-17
Payer: COMMERCIAL

## 2020-06-17 PROCEDURE — 93298 REM INTERROG DEV EVAL SCRMS: CPT

## 2020-06-17 PROCEDURE — G2066: CPT

## 2020-07-22 ENCOUNTER — APPOINTMENT (OUTPATIENT)
Dept: ELECTROPHYSIOLOGY | Facility: CLINIC | Age: 60
End: 2020-07-22
Payer: COMMERCIAL

## 2020-07-22 PROCEDURE — G2066: CPT

## 2020-07-22 PROCEDURE — 93298 REM INTERROG DEV EVAL SCRMS: CPT

## 2020-08-26 ENCOUNTER — APPOINTMENT (OUTPATIENT)
Dept: ELECTROPHYSIOLOGY | Facility: CLINIC | Age: 60
End: 2020-08-26
Payer: COMMERCIAL

## 2020-08-26 PROCEDURE — 93298 REM INTERROG DEV EVAL SCRMS: CPT

## 2020-08-26 PROCEDURE — G2066: CPT

## 2020-09-30 ENCOUNTER — APPOINTMENT (OUTPATIENT)
Dept: ELECTROPHYSIOLOGY | Facility: CLINIC | Age: 60
End: 2020-09-30
Payer: COMMERCIAL

## 2020-09-30 PROCEDURE — 93298 REM INTERROG DEV EVAL SCRMS: CPT

## 2020-09-30 PROCEDURE — G2066: CPT

## 2020-11-04 ENCOUNTER — APPOINTMENT (OUTPATIENT)
Dept: ELECTROPHYSIOLOGY | Facility: CLINIC | Age: 60
End: 2020-11-04
Payer: COMMERCIAL

## 2020-11-04 PROCEDURE — 93298 REM INTERROG DEV EVAL SCRMS: CPT

## 2020-11-04 PROCEDURE — G2066: CPT

## 2020-12-09 ENCOUNTER — APPOINTMENT (OUTPATIENT)
Dept: ELECTROPHYSIOLOGY | Facility: CLINIC | Age: 60
End: 2020-12-09
Payer: SELF-PAY

## 2020-12-09 PROCEDURE — 93298 REM INTERROG DEV EVAL SCRMS: CPT

## 2020-12-09 PROCEDURE — G2066: CPT

## 2021-01-13 ENCOUNTER — APPOINTMENT (OUTPATIENT)
Dept: ELECTROPHYSIOLOGY | Facility: CLINIC | Age: 61
End: 2021-01-13
Payer: COMMERCIAL

## 2021-01-13 PROCEDURE — G2066: CPT

## 2021-01-13 PROCEDURE — 93298 REM INTERROG DEV EVAL SCRMS: CPT

## 2021-02-17 ENCOUNTER — NON-APPOINTMENT (OUTPATIENT)
Age: 61
End: 2021-02-17

## 2021-02-17 ENCOUNTER — APPOINTMENT (OUTPATIENT)
Dept: ELECTROPHYSIOLOGY | Facility: CLINIC | Age: 61
End: 2021-02-17
Payer: COMMERCIAL

## 2021-02-17 PROCEDURE — 93298 REM INTERROG DEV EVAL SCRMS: CPT

## 2021-02-17 PROCEDURE — G2066: CPT

## 2021-03-24 ENCOUNTER — APPOINTMENT (OUTPATIENT)
Dept: ELECTROPHYSIOLOGY | Facility: CLINIC | Age: 61
End: 2021-03-24
Payer: COMMERCIAL

## 2021-03-24 ENCOUNTER — NON-APPOINTMENT (OUTPATIENT)
Age: 61
End: 2021-03-24

## 2021-03-24 PROCEDURE — G2066: CPT

## 2021-03-24 PROCEDURE — 93298 REM INTERROG DEV EVAL SCRMS: CPT

## 2021-04-28 ENCOUNTER — APPOINTMENT (OUTPATIENT)
Dept: ELECTROPHYSIOLOGY | Facility: CLINIC | Age: 61
End: 2021-04-28
Payer: COMMERCIAL

## 2021-04-28 ENCOUNTER — NON-APPOINTMENT (OUTPATIENT)
Age: 61
End: 2021-04-28

## 2021-04-28 PROCEDURE — 93298 REM INTERROG DEV EVAL SCRMS: CPT

## 2021-04-28 PROCEDURE — G2066: CPT

## 2021-05-03 ENCOUNTER — APPOINTMENT (OUTPATIENT)
Dept: SURGERY | Facility: CLINIC | Age: 61
End: 2021-05-03
Payer: COMMERCIAL

## 2021-05-03 VITALS
DIASTOLIC BLOOD PRESSURE: 80 MMHG | HEIGHT: 67 IN | WEIGHT: 163.5 LBS | BODY MASS INDEX: 25.66 KG/M2 | SYSTOLIC BLOOD PRESSURE: 135 MMHG | HEART RATE: 61 BPM

## 2021-05-03 DIAGNOSIS — I10 ESSENTIAL (PRIMARY) HYPERTENSION: ICD-10-CM

## 2021-05-03 DIAGNOSIS — I50.9 HEART FAILURE, UNSPECIFIED: ICD-10-CM

## 2021-05-03 DIAGNOSIS — Z78.9 OTHER SPECIFIED HEALTH STATUS: ICD-10-CM

## 2021-05-03 DIAGNOSIS — I21.9 ACUTE MYOCARDIAL INFARCTION, UNSPECIFIED: ICD-10-CM

## 2021-05-03 DIAGNOSIS — N64.4 MASTODYNIA: ICD-10-CM

## 2021-05-03 DIAGNOSIS — I63.9 CEREBRAL INFARCTION, UNSPECIFIED: ICD-10-CM

## 2021-05-03 PROCEDURE — 99072 ADDL SUPL MATRL&STAF TM PHE: CPT

## 2021-05-03 PROCEDURE — 99204 OFFICE O/P NEW MOD 45 MIN: CPT

## 2021-05-03 NOTE — DATA REVIEWED
[FreeTextEntry1] : Date of Exam: 2021\par  \par EXAM: DIGITAL BILATERAL SCREENING MAMMOGRAM WITH CAD AND TOMOSYNTHESIS AND BREAST ULTRASOUND\par \par HISTORY: The patient is 60 years old and is seen for a screening mammogram. Family history of breast cancer: None. \par \par CLINICAL BREAST EXAMINATION: The patient reports that her last clinical breast exam was within the past year. \par \par COMPARISON: The present examination has been compared to prior breast imaging studies dating back to 2012\par \par MAMMOGRAM:\par \par TECHNIQUE: The following views were obtained digitally: bilateral craniocaudal, bilateral mediolateral oblique. Low-dose full-field digital breast tomosynthesis examination was performed with tomosynthesis acquisitions and synthesized 2D reconstructed mammogram. Computer-aided detection (CAD) was utilized.  \par \par FINDINGS: \par BREAST COMPOSITION: The breasts are heterogeneously dense, which may obscure small masses.\par \par A pacemaker is seen in the left medial superior mid breast which obscures evaluation of this area. No suspicious masses, architectural distortion, or significant calcifications are detected.\par \par BREAST ULTRASOUND:\par \par HISTORY: Supplemental screening evaluation of dense breasts.\par \par TECHNIQUE: A bilateral breast ultrasound was performed with complete evaluation of the four quadrants, retroareolar region, and axilla. \par \par FINDINGS: \par The right breast was unremarkable. The left 7 o'clock position, 2 cm the nipple, demonstrated 2 x 2 x 2 mm intraductal mass. \par \par There is no lymphadenopathy in the axillae.\par \par IMPRESSION: Left 7 o'clock position 2 mm intraductal mass for which an ultrasound guided core biopsy is recommended. Results were relayed to the critical results team who will notify the referring physician.\par FOLLOW-UP: Ultrasound guided biopsy.\par ASSESSMENT: BI-RADS Category 4:  Suspicious. \par \par \par \par \par Date of Exam: 2021\par  \par Addendum 2021\par  \par BREAST SURGICAL PATHOLOGY ADDENDUM:\par \par Left breast 7 o'clock 2 cm from the nipple: Intraductal papilloma.\par \par Pathology results indicate that the specimen is high risk .\par \par The pathology results are concordant with the imaging. \par \par Surgical consultation and appropriate measurement recommended.\par \par \par Original Report\par EXAM: ULTRASOUND-GUIDED CORE BIOPSY 1 SITE\par \par HISTORY: The patient is 60 years old and is referred for an ultrasound-guided needle biopsy of hypoechoic nodule with internal solid mass possibility intraductal at the left 7 o'clock axis.\par \par COMPARISON: Prior breast imaging studies dated 3/12/2021. \par \par PROCEDURE: Targeted ultrasound performed prior to the procedure reconfirms the suspicious findin.1 cm hypoechoic nodule at 0.2 cm intraductal solid nodule noted at the left 7 o'clock location, 2 cm from the nipple. \par \par The risks and benefits of the procedure were conveyed to the patient, and the patient consented to the procedure. Universal timeout was performed.\par \par Using sterile technique, 5 mL of 1% lidocaine was administered. A skin incision was made prior to insertion of the biopsy needle. Under sonographic visualization, a 14-gauge Novatek Maxcore spring-loaded core biopsy device was used to sample the target. 3 samples were obtained. A S shaped biopsy clip was deployed at the biopsy site. A sonographically visualized residual lesion was not present post core biopsy. \par \par A postprocedure mammogram demonstrates appropriate placement of the clip. \par \par The patient tolerated the procedure well without complications. The patient was given postbiopsy care instructions. The specimen was subsequently sent to the pathology lab. \par \par IMPRESSION: 1 site ultrasound-guided core biopsy was performed

## 2021-05-03 NOTE — PHYSICAL EXAM
[Normal Breath Sounds] : Normal breath sounds [Normal Rate and Rhythm] : normal rate and rhythm [No Rash or Lesion] : No rash or lesion [Alert] : alert [Oriented to Person] : oriented to person [Oriented to Place] : oriented to place [Oriented to Time] : oriented to time [Calm] : calm [JVD] : no jugular venous distention  [de-identified] : A/Ox3; NAD. appears comfortable [de-identified] : EOMI; sclera anicteric. Nasal mucosa pink, septum midline. Oral mucosa pink. Tongue midline, Pharynx without exudates. [de-identified] : No chest deformity, No asymmetry, Normal contours,No nodules, No masses, No axillary adenopathy, No nipple discharge,No tenderness; has a keloid to the left side of chest \par  [de-identified] : abd is soft, NT/ND\par  [de-identified] : +ROM, no joint swelling; ambulates with cane

## 2021-05-03 NOTE — HISTORY OF PRESENT ILLNESS
[de-identified] : MARLEN MCFARLAND is a 60 year old F who is referred to the office for consultation visit, she recently had US guided core biopsy of suspicious finding of left breast, found to have Intraductal Papilloma. Patient w PMHX, TIA/Stroke, Hx of Neuropathy, Asthma, HTN, MI, with a cardiac loop recorder. \par Patient denies any personal or family history of breast CA. Denies nipple discharge. No masses or lumps felt to either breast. She has not had previous breast biopsies in the past.

## 2021-05-03 NOTE — PLAN
[FreeTextEntry1] : Ms. MARLEN MCFARLAND was informed of significance of findings. All the options, risks and benefits were explained. Informed consent for excision of Intraductal Papilloma of L. breast, with pre op spot localization and potential risks, benefits and alternatives (surgical options were discussed including non-surgical options or the option of no surgery) to the planned surgery were discussed in depth. All surgical options were discussed including non-surgical treatments. She wishes to proceed with surgery. We will plan for surgery on at the next available date, pending any required insurance pre-certification or pre-approval. She agrees to obtain any necessary pre-operative evaluations and testing prior to surgery.\par Patient advised to seek immediate medical attention with any acute change in symptoms or with the development of any new or worsening symptoms. Patient's questions and concerns addressed to patient's satisfaction, and patient verbalized an understanding of the information discussed.\par \par \par \par

## 2021-05-03 NOTE — CONSULT LETTER
[Dear  ___] : Dear  [unfilled], [Consult Letter:] : I had the pleasure of evaluating your patient, [unfilled]. [Consult Closing:] : Thank you very much for allowing me to participate in the care of this patient.  If you have any questions, please do not hesitate to contact me. [Sincerely,] : Sincerely, [FreeTextEntry3] : Duke Saavedra MD\par

## 2021-05-03 NOTE — ASSESSMENT
[FreeTextEntry1] : Patient is a 60 y.o F, found to have Intraductal Papilloma of the L. breast at 7:00.

## 2021-05-03 NOTE — REVIEW OF SYSTEMS
[Arthralgias] : arthralgias [Fever] : no fever [Chills] : no chills [Feeling Poorly] : not feeling poorly [Chest Pain] : no chest pain [Shortness Of Breath] : no shortness of breath [Abdominal Pain] : no abdominal pain [Pelvic Pain] : no pelvic pain [Skin Lesions] : no skin lesions [Skin Wound] : no skin wound [Breast Pain] : no breast pain [Dizziness] : no dizziness [Anxiety] : no anxiety [Muscle Weakness] : no muscle weakness [Swollen Glands] : no swollen glands [FreeTextEntry5] : hx of MI; has a loop recorder  [FreeTextEntry6] : hx asthma  [FreeTextEntry9] : neuropathic pains  [de-identified] : S/P L. breast biopsy

## 2021-06-02 ENCOUNTER — OUTPATIENT (OUTPATIENT)
Dept: OUTPATIENT SERVICES | Facility: HOSPITAL | Age: 61
LOS: 1 days | End: 2021-06-02
Payer: COMMERCIAL

## 2021-06-02 ENCOUNTER — APPOINTMENT (OUTPATIENT)
Dept: ELECTROPHYSIOLOGY | Facility: CLINIC | Age: 61
End: 2021-06-02
Payer: COMMERCIAL

## 2021-06-02 ENCOUNTER — NON-APPOINTMENT (OUTPATIENT)
Age: 61
End: 2021-06-02

## 2021-06-02 VITALS
TEMPERATURE: 98 F | SYSTOLIC BLOOD PRESSURE: 136 MMHG | HEART RATE: 65 BPM | RESPIRATION RATE: 17 BRPM | OXYGEN SATURATION: 98 % | WEIGHT: 162.04 LBS | HEIGHT: 67 IN | DIASTOLIC BLOOD PRESSURE: 80 MMHG

## 2021-06-02 DIAGNOSIS — Z01.818 ENCOUNTER FOR OTHER PREPROCEDURAL EXAMINATION: ICD-10-CM

## 2021-06-02 DIAGNOSIS — D24.2 BENIGN NEOPLASM OF LEFT BREAST: ICD-10-CM

## 2021-06-02 DIAGNOSIS — Z98.51 TUBAL LIGATION STATUS: Chronic | ICD-10-CM

## 2021-06-02 DIAGNOSIS — Z98.891 HISTORY OF UTERINE SCAR FROM PREVIOUS SURGERY: Chronic | ICD-10-CM

## 2021-06-02 DIAGNOSIS — I10 ESSENTIAL (PRIMARY) HYPERTENSION: ICD-10-CM

## 2021-06-02 PROCEDURE — G0463: CPT

## 2021-06-02 PROCEDURE — 93298 REM INTERROG DEV EVAL SCRMS: CPT

## 2021-06-02 PROCEDURE — G2066: CPT

## 2021-06-02 RX ORDER — SODIUM CHLORIDE 9 MG/ML
3 INJECTION INTRAMUSCULAR; INTRAVENOUS; SUBCUTANEOUS EVERY 8 HOURS
Refills: 0 | Status: DISCONTINUED | OUTPATIENT
Start: 2021-06-09 | End: 2021-06-16

## 2021-06-02 RX ORDER — METOPROLOL TARTRATE 50 MG
25 TABLET ORAL
Qty: 0 | Refills: 0 | DISCHARGE

## 2021-06-02 NOTE — H&P PST ADULT - HISTORY OF PRESENT ILLNESS
60year old female with pmhx of asthma, hypertension, hyperlipidemia, CHF, MI, Idiopathic neuropathy, seasonal allergies, TIA, headaches and multiparity presents with c/o abnormal mammogram revealing benign intraductal papilloma of left breast confirmed by biopsy. Patient is here today for presurgical testing for scheduled excision of left breast intraductal papilloma with preoperative needle localization on 6/9/2021

## 2021-06-02 NOTE — H&P PST ADULT - NSICDXFAMILYHX_GEN_ALL_CORE_FT
FAMILY HISTORY:  Father  Still living? Unknown  Family history of diabetes mellitus, Age at diagnosis: Age Unknown    Mother  Still living? Unknown  Family history of hypertension, Age at diagnosis: Age Unknown

## 2021-06-02 NOTE — H&P PST ADULT - NSICDXPROBLEM_GEN_ALL_CORE_FT
PROBLEM DIAGNOSES  Problem: Benign neoplasm of left breast  Assessment and Plan: Patient is scheduled for excision of left breast, intraductal papilloma with preoperative needle localizatiom on 6/9/2021. Written and oral preoperative instructions given to patient with understanding verbalized. Instructions given to include using 4% chlorhexidine wash as directed day of surgery, maintaining NPO status post midnight day before surgery and expecting a phone call day before surgery to give arrival time for surgery.     Problem: Hypertension  Assessment and Plan: Patient instructed with understanding verbalized to take lisinopril and metoprolol with a sip of water the morning of surgery.

## 2021-06-02 NOTE — H&P PST ADULT - NSICDXPASTMEDICALHX_GEN_ALL_CORE_FT
PAST MEDICAL HISTORY:  Asthma     CHF (congestive heart failure)     Hyperlipidemia     Hypertension     Idiopathic neuropathy     LBBB (left bundle branch block)     Migraines     Myocardial infarction, unspecified MI type, unspecified artery

## 2021-06-05 DIAGNOSIS — Z01.818 ENCOUNTER FOR OTHER PREPROCEDURAL EXAMINATION: ICD-10-CM

## 2021-06-06 ENCOUNTER — APPOINTMENT (OUTPATIENT)
Dept: DISASTER EMERGENCY | Facility: CLINIC | Age: 61
End: 2021-06-06

## 2021-06-08 ENCOUNTER — TRANSCRIPTION ENCOUNTER (OUTPATIENT)
Age: 61
End: 2021-06-08

## 2021-06-09 ENCOUNTER — RESULT REVIEW (OUTPATIENT)
Age: 61
End: 2021-06-09

## 2021-06-09 ENCOUNTER — APPOINTMENT (OUTPATIENT)
Dept: SURGERY | Facility: HOSPITAL | Age: 61
End: 2021-06-09

## 2021-06-09 ENCOUNTER — OUTPATIENT (OUTPATIENT)
Dept: OUTPATIENT SERVICES | Facility: HOSPITAL | Age: 61
LOS: 1 days | End: 2021-06-09
Payer: COMMERCIAL

## 2021-06-09 VITALS
DIASTOLIC BLOOD PRESSURE: 62 MMHG | OXYGEN SATURATION: 100 % | HEART RATE: 75 BPM | RESPIRATION RATE: 16 BRPM | TEMPERATURE: 98 F | SYSTOLIC BLOOD PRESSURE: 113 MMHG | HEIGHT: 67 IN | WEIGHT: 162.04 LBS

## 2021-06-09 VITALS
HEART RATE: 58 BPM | RESPIRATION RATE: 14 BRPM | DIASTOLIC BLOOD PRESSURE: 61 MMHG | OXYGEN SATURATION: 100 % | SYSTOLIC BLOOD PRESSURE: 111 MMHG | TEMPERATURE: 98 F

## 2021-06-09 DIAGNOSIS — Z98.891 HISTORY OF UTERINE SCAR FROM PREVIOUS SURGERY: Chronic | ICD-10-CM

## 2021-06-09 DIAGNOSIS — D24.2 BENIGN NEOPLASM OF LEFT BREAST: ICD-10-CM

## 2021-06-09 DIAGNOSIS — Z98.51 TUBAL LIGATION STATUS: Chronic | ICD-10-CM

## 2021-06-09 PROCEDURE — 88307 TISSUE EXAM BY PATHOLOGIST: CPT

## 2021-06-09 PROCEDURE — 19125 EXCISION BREAST LESION: CPT

## 2021-06-09 PROCEDURE — 88305 TISSUE EXAM BY PATHOLOGIST: CPT | Mod: 26

## 2021-06-09 PROCEDURE — 19281 PERQ DEVICE BREAST 1ST IMAG: CPT

## 2021-06-09 PROCEDURE — 19281 PERQ DEVICE BREAST 1ST IMAG: CPT | Mod: LT

## 2021-06-09 PROCEDURE — 76098 X-RAY EXAM SURGICAL SPECIMEN: CPT | Mod: 26

## 2021-06-09 PROCEDURE — 76098 X-RAY EXAM SURGICAL SPECIMEN: CPT

## 2021-06-09 PROCEDURE — 19125 EXCISION BREAST LESION: CPT | Mod: LT

## 2021-06-09 RX ORDER — OXYCODONE AND ACETAMINOPHEN 5; 325 MG/1; MG/1
1 TABLET ORAL ONCE
Refills: 0 | Status: DISCONTINUED | OUTPATIENT
Start: 2021-06-09 | End: 2021-06-09

## 2021-06-09 RX ORDER — ONDANSETRON 8 MG/1
4 TABLET, FILM COATED ORAL ONCE
Refills: 0 | Status: DISCONTINUED | OUTPATIENT
Start: 2021-06-09 | End: 2021-06-09

## 2021-06-09 RX ORDER — ACETAMINOPHEN 500 MG
1000 TABLET ORAL ONCE
Refills: 0 | Status: DISCONTINUED | OUTPATIENT
Start: 2021-06-09 | End: 2021-06-09

## 2021-06-09 RX ORDER — FENTANYL CITRATE 50 UG/ML
25 INJECTION INTRAVENOUS
Refills: 0 | Status: DISCONTINUED | OUTPATIENT
Start: 2021-06-09 | End: 2021-06-09

## 2021-06-09 RX ADMIN — SODIUM CHLORIDE 3 MILLILITER(S): 9 INJECTION INTRAMUSCULAR; INTRAVENOUS; SUBCUTANEOUS at 08:23

## 2021-06-09 NOTE — ASU DISCHARGE PLAN (ADULT/PEDIATRIC) - ASU DC SPECIAL INSTRUCTIONSFT
You had a left breast lumpectomy performed. There is a dressing in place over your surgical site, dressing can be removed in 48 hours. Underneath the dressing will be steri-strips, do not remove as they are to fall off on their own. You may resume normal diet. You may shower. For pain take over the counter Tylenol, if pain is not controlled by Tylenol take percocet as prescribed.

## 2021-06-09 NOTE — ASU DISCHARGE PLAN (ADULT/PEDIATRIC) - CARE PROVIDER_API CALL
Duke Saavedra (MD)  Surgery  [id number] [place], [place] [id number]  Phone: [phone]  Fax: [phone]  Follow Up Time: 1 week

## 2021-06-09 NOTE — BRIEF OPERATIVE NOTE - NSICDXBRIEFPROCEDURE_GEN_ALL_CORE_FT
PROCEDURES:  Lumpectomy of left breast with needle localization 09-Jun-2021 12:19:47  Miguel Hughes

## 2021-06-10 PROBLEM — G60.9 HEREDITARY AND IDIOPATHIC NEUROPATHY, UNSPECIFIED: Chronic | Status: ACTIVE | Noted: 2021-06-02

## 2021-06-10 PROBLEM — E78.5 HYPERLIPIDEMIA, UNSPECIFIED: Chronic | Status: ACTIVE | Noted: 2021-06-02

## 2021-06-10 PROBLEM — I10 ESSENTIAL (PRIMARY) HYPERTENSION: Chronic | Status: ACTIVE | Noted: 2021-06-02

## 2021-06-10 PROBLEM — G43.909 MIGRAINE, UNSPECIFIED, NOT INTRACTABLE, WITHOUT STATUS MIGRAINOSUS: Chronic | Status: ACTIVE | Noted: 2021-06-02

## 2021-06-11 LAB — SURGICAL PATHOLOGY STUDY: SIGNIFICANT CHANGE UP

## 2021-06-17 ENCOUNTER — APPOINTMENT (OUTPATIENT)
Dept: SURGERY | Facility: CLINIC | Age: 61
End: 2021-06-17
Payer: COMMERCIAL

## 2021-06-17 VITALS — TEMPERATURE: 96.7 F

## 2021-06-17 DIAGNOSIS — D24.2 BENIGN NEOPLASM OF LEFT BREAST: ICD-10-CM

## 2021-06-17 PROCEDURE — 99024 POSTOP FOLLOW-UP VISIT: CPT

## 2021-06-17 NOTE — PHYSICAL EXAM
[Normal Breath Sounds] : Normal breath sounds [Normal Rate and Rhythm] : normal rate and rhythm [No Rash or Lesion] : No rash or lesion [Alert] : alert [Oriented to Person] : oriented to person [Oriented to Place] : oriented to place [Oriented to Time] : oriented to time [Calm] : calm [de-identified] : A/Ox3; NAD. appears comfortable [de-identified] : wound healing well with no seroma, drainage or erythema. No infection noted. \par

## 2021-06-17 NOTE — ASSESSMENT
[FreeTextEntry1] : MARLEN MCFARLAND is a 60 y.o F, she is S/P preoperative needle localization and excision of left breast papilloma 06/09/21. Path results c/w intraductal papilloma. Patient is without reported complaints, stating she is feeling well. No pain. \par \par Incision site is healing well and as expected. There is no evidence of infection/complication, and patient is progressing as expected. Post-operative wound care, activities, restrictions and precautions were reinforced. Pathology results were discussed in detail. Patient's questions and concerns addressed to patient's satisfaction.\par

## 2021-06-17 NOTE — HISTORY OF PRESENT ILLNESS
[de-identified] : MARLEN MCFARLAND presents to the office for postoperative visit today, she is S/P preoperative needle localization and excision of left breast papilloma 06/09/21. Path results c/w intraductal papilloma. Patient is without reported complaints, stating she is feeling well. No pain.

## 2021-06-17 NOTE — PLAN
[FreeTextEntry1] : patient will follow up if needed. Warning signs, follow up, and restrictions were discussed with the patient.\par

## 2021-06-17 NOTE — REASON FOR VISIT
[Post Op: _________] : a [unfilled] post op visit [FreeTextEntry1] : S/P preoperative needle localization and excision of left breast papilloma 06/09/21

## 2021-06-29 ENCOUNTER — NON-APPOINTMENT (OUTPATIENT)
Age: 61
End: 2021-06-29

## 2021-06-29 ENCOUNTER — APPOINTMENT (OUTPATIENT)
Dept: ORTHOPEDIC SURGERY | Facility: CLINIC | Age: 61
End: 2021-06-29
Payer: COMMERCIAL

## 2021-06-29 VITALS
DIASTOLIC BLOOD PRESSURE: 72 MMHG | BODY MASS INDEX: 25.58 KG/M2 | SYSTOLIC BLOOD PRESSURE: 156 MMHG | OXYGEN SATURATION: 98 % | HEART RATE: 65 BPM | WEIGHT: 163 LBS | HEIGHT: 67 IN

## 2021-06-29 PROCEDURE — 99204 OFFICE O/P NEW MOD 45 MIN: CPT | Mod: 25

## 2021-06-29 PROCEDURE — 73502 X-RAY EXAM HIP UNI 2-3 VIEWS: CPT | Mod: LT

## 2021-06-29 PROCEDURE — 20611 DRAIN/INJ JOINT/BURSA W/US: CPT | Mod: LT

## 2021-06-29 RX ORDER — DICLOFENAC SODIUM 50 MG/1
50 TABLET, DELAYED RELEASE ORAL
Qty: 60 | Refills: 1 | Status: ACTIVE | COMMUNITY
Start: 2021-06-29 | End: 1900-01-01

## 2021-06-29 NOTE — HISTORY OF PRESENT ILLNESS
[de-identified] : CC L hip\par \par HPI 59 yo female presents with acute onset of 8 months of acute related and constant pain in the lateral greater than groin left hip after a fall on the sidewalk. The pain is worse, and rated a 8 out of 10, described as sharp stabbing burning and throbbing, [without radiation]. Rest makes the pain better and walking makes the pain worse. The patient reports associated symptoms of left lower extremity weakness and difficulty walking. The patient - similar pain previously .\par \par \par Previous treatments include:\par Activity Modification	+\par Ice/Compression 	+\par NSAIDs  		+\par Physical Therapy 	-\par Cortisone Injection	-\par Surgery  		-\par \par Review of Systems is positive for the above musculoskeletal symptoms and is otherwise non-contributory for general, constitutional, psychiatric, neurologic, HEENT, cardiac, respiratory, gastrointestinal, reproductive, lymphatic, and dermatologic complaints.\par \par Consult By Dr Loza

## 2021-06-29 NOTE — DISCUSSION/SUMMARY
[de-identified] : Left greater trochanter bursitis traumatic\par Left lower extremity suspect possible spine involvement\par \par \par I discussed the findings and history exam and radiology\par \par Physical therapy\par \par The patient was prescribed Diclofenac PO non-steroidal anti-inflammatory medication. 50mg tablets twice daily to be taken for at least 1-2 weeks in a row and then PRN afterwards. Risks and benefits were discussed and include but not limited to renal damage and GI ulceration and bleeding.  They were advised to take with food to limit stomach upset as well as warned to stop the medication if worsening gastric pain or dizziness or other side effects. Also to immediately stop the medication and seek appropriate medical attention if any severe stomach ache, gastritis, black/red vomit, black/red stools or any other medical concern.\par \par Procedure Note:\par \par Verbal consent was obtained for bursal injection of the LEFT greater trochanteric bursa, after the risks and benefits were discussed with the patient. Potential adverse effects were discussed including but not limited to bleeding, skin/ joint infection, local skin reactions including bleaching, bruising, stiffness, soreness, vasovagal episodes, transient hyperglycemia, avascular necrosis, pseudo-septic type reactions, post injection joint pain, allergic reaction to product or anesthetic and other rare but potential adverse effects along with benefits including decreased pain and improved stability prior to obtaining verbal informed consent. It was also discussed that for some patients the treatment is ineffective and there are no guarantees that the patient will experience improvement as the result of the injection. In rare occasions the injection can cause worsening of pain.\par \par The use of a Sonosite 5-2 MHz curvilinear transducer with live ultrasound guidance of the hip injection was necessary given the patient's BMI and local body habitus overlying and obscuring the identification of normal body bony anatomy used to identify the injection site and the depth of soft tissue envelope necessitating a longer than normal needle to reach the joint space, and to confirm the location of the needle tip and intra-articular delivery of the medication. Without the use of live ultrasound guidance the injection would have been more difficult and place the patient's neurovascular structures at risk from the longer needle needed to traverse the soft tissue envelope.\par \par The patient was placed in the contralateral lateral position. The lateral injection site was identified using the ultrasound probe over the area of greatest tenderness at the greater trochanter with overlying fascia christy. The injection site was marked and prepped with a ChloraPrep swab and anesthetized with ethylchloride skin anesthesia. Using sterile technique a 20g 3 1/2 in spinal needle with 3 cc total of 1cc 1% lidocaine without epinephrine, 1cc 0.25% Marcaine without epinephrine and 1cc of 40mg/mL Kenalog was passed through the injection site towards the visualized bursal fluid under the fascia lateral to the greater trochanter After penetrating the fascia christy and placing the needle adjacent to the trochanter, the medication was injected without resistance under live ultrasound visualization and noted to distend the joint bursa. The injection site was sterilely dressed, there was minimal blood loss. The patient tolerated this procedure without any complications done by myself. Images were recorded and saved.\par \par The patient has been advised that if they notice any worsening of symptoms or any problems to contact me and seek care from a qualified medical professional. The patient was instructed to ice the hip and take NSAID medication on an as needed basis if the patient feels discomfort.\par \par Recommend followup spine\par \par Followup physical therapy if unimproved for consideration of intra-articular hip injection to rule out intra-articular hip pathology

## 2021-06-29 NOTE — PHYSICAL EXAM
[de-identified] : Physical Examination\par General: well nourished, in no acute distress, alert and oriented to person, place and time\par Psychiatric: normal mood and affect, no abnormal movements or speech patterns\par Eyes: vision intact - glasses\par Throat: no thyromegaly\par Lymph: no enlarged nodes, no lymphedema in extremity\par Respiratory: no wheezing, no shortness of breath with ambulation\par Cardiac: no cardiac leg swelling, 2+ peripheral pulses\par Neurology: normal gross sensation in extremities to light touch\par Abdomen: soft, non-tender, tympanic, no masses\par \par Musculoskeletal Examination\par Ambulation	+ antalgic gait, + cane assistive devices\par \par Hip			Right			Left\par General\par      Swelling/Deformity	normal			normal	\par      Skin			normal			normal\par      Erythema		-			-\par      Standing Alignment	neutral			neutral\par Range of Motion\par      Flexion		90			90\par      Abduction		30			30\par      Flex ER		45			45\par      Flex IR		15			15\par      Knee        		0 - 130			0 - 130\par Provocative Exam\par      Log Roll		-			-\par      Heel Strike		-			-\par      Fig 4			-			-\par      SLR			-			-\par Palpation\par      Public Symphysis	-			-\par      Groin   	 	-			-\par      Greater Trochanter	-			+\par      Piriformis		-			-\par      SI Joint		-			-\par Strength Examination/Atrophy\par      Hip Flexor		5+			4+\par      Quadriceps		5+			4+\par      Hamstring		5+			4+\par LLE plantar 4+ dorsiflex 5+\par Sensation\par      Deep Peroneal        	normal			normal\par      Superficial Peroneal 	normal			normal\par      Sural  	 	normal			normal\par      Posterior Tibial        	normal			normal\par      Saphenous		normal			normal\par Pulse\par      DP			2+			2+\par  [de-identified] : 2 views of the affected L hip (AP and Frog Lateral )\par were ordered, taken and evaluated by myself today and \par demonstrate:\par [normal] acetabular morphology\par decreased anterior headneck offset femoral head neck morphology\par no acetabular osteophytes \par no asymmetric joint space loss\par Spherical femoral head

## 2021-07-07 ENCOUNTER — NON-APPOINTMENT (OUTPATIENT)
Age: 61
End: 2021-07-07

## 2021-07-07 ENCOUNTER — APPOINTMENT (OUTPATIENT)
Dept: ELECTROPHYSIOLOGY | Facility: CLINIC | Age: 61
End: 2021-07-07
Payer: COMMERCIAL

## 2021-07-07 ENCOUNTER — APPOINTMENT (OUTPATIENT)
Dept: ORTHOPEDIC SURGERY | Facility: CLINIC | Age: 61
End: 2021-07-07
Payer: COMMERCIAL

## 2021-07-07 VITALS
HEIGHT: 67 IN | DIASTOLIC BLOOD PRESSURE: 72 MMHG | SYSTOLIC BLOOD PRESSURE: 110 MMHG | BODY MASS INDEX: 25.58 KG/M2 | WEIGHT: 163 LBS | OXYGEN SATURATION: 98 % | HEART RATE: 80 BPM

## 2021-07-07 PROCEDURE — 72110 X-RAY EXAM L-2 SPINE 4/>VWS: CPT

## 2021-07-07 PROCEDURE — 93298 REM INTERROG DEV EVAL SCRMS: CPT

## 2021-07-07 PROCEDURE — G2066: CPT

## 2021-07-07 PROCEDURE — 99214 OFFICE O/P EST MOD 30 MIN: CPT

## 2021-07-07 NOTE — PHYSICAL EXAM
[de-identified] : Cervical Physical Exam\par \par Gait -  antalgic gait, limping, using a cane\par \par Station - Normal\par \par Sagittal balance - Normal \par \par Compensatory mechanism? - None\par \par Horizontal gaze - Maintained\par \par \par Reflexes\par Biceps - Normal\par Triceps - Normal\par Brachioradialis - Normal\par Patellar - Normal\par Gastroc - Normal\par Clonus -No\par \par Douglas´s -  positive on the right\par \par Shoulder Exam - Normal\par \par Spurling´s - None\par \par Wrist Pulses -2+ radial/ulnar\par \par Foot Pulses -2+ DP/PT\par \par Cervical range of motion - Normal\par \par Sensation \par C5-T1 sensation intact to light touch bilaterally\par \par L1-S1 sensation intact to light touch bilaterally\par \par Motor\par \par \par 	Deltoid	Biceps	Triceps	WF	WE	IO	\par Right	5/5	5/5	5/5	5/5	5/5	5/5	5/5\par Left	5/5	5/5	5/5	5/5	5/5	5/5	5/5\par \par \par 	IP	Quad	HS	TA	Gastroc	EHL\par Right	5/5	5/5	5/5	5/5	5/5	5/5\par Left	2/5	3/5	3/5	3/5	3/5	3/5 [de-identified] : Lumbar radiographs\par L3-L4 spondylolisthesis noted\par L4-L5 spondylolisthesis noted\par Facet arthropathy noted\par \par Lumbar MRI reviewed\par Bilateral lateral recess stenosis at L3-L4\par Bilateral lateral recess stenosis at L4-L5\par No obvious compression of the cauda equina\par Foraminal stenosis at L4-L5 bilaterally

## 2021-07-07 NOTE — REASON FOR VISIT
[Initial Visit] : an initial visit for [Back Pain] : back pain [Radiculopathy] : radiculopathy [FreeTextEntry2] : Gait instability

## 2021-07-07 NOTE — HISTORY OF PRESENT ILLNESS
[de-identified] : This is a 60-year-old female that has been dealing with worsening back pain, lower extremity pain, gait instability since February 2021.  Since April 2021 she has had to walk with a cane.  She reports significant issues with left lower extremity weakness and pain.  She also has some right-sided pain but this is not as bad as the left side.  She describes pain over her anterior thighs bilaterally and down her posterior lateral calf and thigh on the left.  She also describes some pain over the anterior portion of her left leg.  At this point she cannot even walk a block due to this pain.  She does feel better when she leans forward on a shopping cart.\par \par Of note the patient does describe urinary incontinence for the past 2 months.  She states that she can start and stop her urinary flow but there are times when she cannot make it to the bathroom because her feet feel numb.  She describes 2 episodes of incontinence both of which was when she needs to go to the bathroom but can get to the toilet in time.  She denies any saddle anesthesia.  She denies any bowel incontinence.  She has not a scheduled appointment with urology to discuss the symptoms.  Of note the patient has had a history of a vaginal delivery.

## 2021-07-07 NOTE — ASSESSMENT
[FreeTextEntry1] : I had a long discussion with the patient in regards to her treatment plan and diagnosis.  I am concerned in regards to her issues with gait instability, severe left lower extremity weakness.  I want to ensure there is no other lesions in her neural axis.  Therefore I am ordering a MRI of her cervical spine and an MRI of her thoracic spine.  While she does have severe stenosis at L3-L4 and L4-L5 there is no clumping of nerve roots or severe central stenosis.  Therefore I encouraged her to follow-up with urology to ensure that we work-up another reason for her urinary issues.  She could have significant lumbar neurogenic claudication which would make it harder for her to get to the bathroom in time but I want to make sure that there is no other urologic issue that is contributing to her lack of control of urination.  Thankfully she has no bowel issues and she denies saddle anesthesia.  I would like her to receive an epidural to help with her symptoms and I have ordered this for her today.  I would like to keep a close clinical eye on the patient and will have her follow-up in 1 to 2 weeks.  I also encouraged her to reach out to me directly if she has any new or worsening symptoms.  She knows to call at any point if her symptoms go in the wrong direction.  Please note that over 30 minutes of time spent in care of this patient which includes previsit preparation, in person visit, post visit documentation.

## 2021-07-16 ENCOUNTER — APPOINTMENT (OUTPATIENT)
Dept: UROLOGY | Facility: CLINIC | Age: 61
End: 2021-07-16
Payer: COMMERCIAL

## 2021-07-16 DIAGNOSIS — R32 UNSPECIFIED URINARY INCONTINENCE: ICD-10-CM

## 2021-07-16 PROCEDURE — 99204 OFFICE O/P NEW MOD 45 MIN: CPT | Mod: 25

## 2021-07-16 PROCEDURE — 51798 US URINE CAPACITY MEASURE: CPT

## 2021-07-16 NOTE — REVIEW OF SYSTEMS
[Negative] : Heme/Lymph [Palpitations] : palpitations [Shortness Of Breath] : shortness of breath [see HPI] : see HPI [Joint Pain] : joint pain [Joint Swelling] : joint swelling [Limb Weakness] : limb weakness [Difficulty Walking] : difficulty walking [Muscle Weakness] : muscle weakness

## 2021-07-16 NOTE — PHYSICAL EXAM
[General Appearance - Well Developed] : well developed [General Appearance - Well Nourished] : well nourished [Normal Appearance] : normal appearance [Well Groomed] : well groomed [General Appearance - In No Acute Distress] : no acute distress [Edema] : no peripheral edema [Abdomen Soft] : soft [Abdomen Tenderness] : non-tender [Abdomen Mass (___ Cm)] : no abdominal mass palpated [Abdomen Hernia] : no hernia was discovered [Costovertebral Angle Tenderness] : no ~M costovertebral angle tenderness [Respiration, Rhythm And Depth] : normal respiratory rhythm and effort [Exaggerated Use Of Accessory Muscles For Inspiration] : no accessory muscle use [FreeTextEntry1] : pvr- zero  [Normal Station and Gait] : the gait and station were normal for the patient's age [] : no rash [No Focal Deficits] : no focal deficits [Oriented To Time, Place, And Person] : oriented to person, place, and time [Affect] : the affect was normal [Mood] : the mood was normal [Not Anxious] : not anxious [Cervical Lymph Nodes Enlarged Posterior Bilaterally] : posterior cervical [Cervical Lymph Nodes Enlarged Anterior Bilaterally] : anterior cervical [Supraclavicular Lymph Nodes Enlarged Bilaterally] : supraclavicular

## 2021-07-16 NOTE — ASSESSMENT
[FreeTextEntry1] : patient with hx of microheamturia\par LINDSEY and cysto \par last urine done with cx : june 2021: 10 red cells with cx nega\par \par as for INC\par may be related to menopause and /or stress from  current LE weakness eval \par told to do voiding diary to eval fluids in and voids\par discussed meds when returns for cysto \par

## 2021-07-16 NOTE — HISTORY OF PRESENT ILLNESS
[FreeTextEntry1] : patien with hx of microheamtujri since 2019 - no eval \par no hx of renal stones\par no tobacco use\par also with recent INC withurge \par uses pads \par no hx of DM\par recent spinal stenosis and other LE weakness issue she related to afte rtaking 2 nd COVID vaccineshot \par bowel habvits OK\par takes avg water intake \par good flwo , no strain to void an feels empty

## 2021-07-21 ENCOUNTER — OUTPATIENT (OUTPATIENT)
Dept: OUTPATIENT SERVICES | Facility: HOSPITAL | Age: 61
LOS: 1 days | End: 2021-07-21
Payer: COMMERCIAL

## 2021-07-21 ENCOUNTER — APPOINTMENT (OUTPATIENT)
Dept: RADIOLOGY | Facility: CLINIC | Age: 61
End: 2021-07-21
Payer: COMMERCIAL

## 2021-07-21 ENCOUNTER — RESULT REVIEW (OUTPATIENT)
Age: 61
End: 2021-07-21

## 2021-07-21 DIAGNOSIS — M54.5 LOW BACK PAIN: ICD-10-CM

## 2021-07-21 DIAGNOSIS — Z98.51 TUBAL LIGATION STATUS: Chronic | ICD-10-CM

## 2021-07-21 DIAGNOSIS — Z98.891 HISTORY OF UTERINE SCAR FROM PREVIOUS SURGERY: Chronic | ICD-10-CM

## 2021-07-21 PROCEDURE — 62323 NJX INTERLAMINAR LMBR/SAC: CPT

## 2021-07-25 ENCOUNTER — OUTPATIENT (OUTPATIENT)
Dept: OUTPATIENT SERVICES | Facility: HOSPITAL | Age: 61
LOS: 1 days | End: 2021-07-25
Payer: COMMERCIAL

## 2021-07-25 ENCOUNTER — APPOINTMENT (OUTPATIENT)
Dept: MRI IMAGING | Facility: IMAGING CENTER | Age: 61
End: 2021-07-25
Payer: COMMERCIAL

## 2021-07-25 DIAGNOSIS — M54.5 LOW BACK PAIN: ICD-10-CM

## 2021-07-25 DIAGNOSIS — Z98.891 HISTORY OF UTERINE SCAR FROM PREVIOUS SURGERY: Chronic | ICD-10-CM

## 2021-07-25 DIAGNOSIS — Z00.8 ENCOUNTER FOR OTHER GENERAL EXAMINATION: ICD-10-CM

## 2021-07-25 DIAGNOSIS — Z98.51 TUBAL LIGATION STATUS: Chronic | ICD-10-CM

## 2021-07-25 PROCEDURE — 72146 MRI CHEST SPINE W/O DYE: CPT | Mod: 26

## 2021-07-25 PROCEDURE — 72141 MRI NECK SPINE W/O DYE: CPT

## 2021-07-25 PROCEDURE — 72141 MRI NECK SPINE W/O DYE: CPT | Mod: 26

## 2021-07-25 PROCEDURE — 72146 MRI CHEST SPINE W/O DYE: CPT

## 2021-07-28 ENCOUNTER — APPOINTMENT (OUTPATIENT)
Dept: ORTHOPEDIC SURGERY | Facility: CLINIC | Age: 61
End: 2021-07-28
Payer: COMMERCIAL

## 2021-07-28 PROCEDURE — 99214 OFFICE O/P EST MOD 30 MIN: CPT

## 2021-07-28 NOTE — ASSESSMENT
[FreeTextEntry1] : I had a long and lengthy discussion with the patient regards to her treatment plan and diagnosis.  She certainly does have left lower extremity weakness and balance issues.  Thankfully she has had improvement in her radicular symptoms after the injection.  She also has a thoracic disc herniation.  She has a somewhat complex picture in terms of what might be making her have these current symptoms.  I would like her to be evaluated by neurology to assess for possible thoracic myelopathy.  Referral for this is been placed today.  I will have her follow-up after her trip out of the country.  I did encourage her to reach out to me directly if her symptoms worsen or change in any way.  I did tell her that if she has any worsening or change in her symptoms she needs to proceed immediately to emergency room.  Please note that over 30 minutes of time spent in care of this patient which includes previsit preparation, in person visit, post visit documentation.

## 2021-07-28 NOTE — PHYSICAL EXAM
[de-identified] : Cervical Physical Exam\par \par Gait -  antalgic gait, but her gait and her use of the cane is actually less than last time\par \par Station - Normal\par \par Sagittal balance - Normal \par \par Compensatory mechanism? - None\par \par Horizontal gaze - Maintained\par \par \par Reflexes\par Biceps - Normal\par Triceps - Normal\par Brachioradialis - Normal\par Patellar - Normal\par Gastroc - Normal\par Clonus -No\par \par Douglas´s -  positive on the right\par \par Shoulder Exam - Normal\par \par Spurling´s - None\par \par Wrist Pulses -2+ radial/ulnar\par \par Foot Pulses -2+ DP/PT\par \par Cervical range of motion - Normal\par \par Sensation \par C5-T1 sensation intact to light touch bilaterally\par \par L1-S1 sensation intact to light touch bilaterally\par \par Motor\par \par \par 	Deltoid	Biceps	Triceps	WF	WE	IO	\par Right	5/5	5/5	5/5	5/5	5/5	5/5	5/5\par Left	5/5	5/5	5/5	5/5	5/5	5/5	5/5\par \par \par 	IP	Quad	HS	TA	Gastroc	EHL\par Right	5/5	5/5	5/5	5/5	5/5	5/5\par Left	2/5	3/5	3/5	3/5	3/5	3/5 [de-identified] : Lumbar radiographs\par L3-L4 spondylolisthesis noted\par L4-L5 spondylolisthesis noted\par Facet arthropathy noted\par \par Lumbar MRI reviewed\par Bilateral lateral recess stenosis at L3-L4\par Bilateral lateral recess stenosis at L4-L5\par No obvious compression of the cauda equina\par Foraminal stenosis at L4-L5 bilaterally\par \par Thoracic MRI reviewed\par There is an area of the thoracic disc herniation in the lower thoracic spine\par I do note some possible cord signal changes at this level\par The disc herniation is more on the right as compared to the left, her symptoms are more on the left as compared to the right\par \par Cervical MRI reviewed\par Cervical spondylosis noted\par No critical areas of central stenosis

## 2021-07-28 NOTE — HISTORY OF PRESENT ILLNESS
[de-identified] : Today the patient states that her symptoms are better since epidural steroid injection.  She is still dealing with lower extremity instability and balance issues.  She denies any new or worsening symptoms.  Fact she states she feels better today.  She denies any bowel bladder issues.  She denies any saddle anesthesia.  She was recently evaluated by urology for possible urinary incontinence but she did not mention this to me today nor did she complain of any issues with urination when I asked her.\par \par 07/07/21\par This is a 60-year-old female that has been dealing with worsening back pain, lower extremity pain, gait instability since February 2021.  Since April 2021 she has had to walk with a cane.  She reports significant issues with left lower extremity weakness and pain.  She also has some right-sided pain but this is not as bad as the left side.  She describes pain over her anterior thighs bilaterally and down her posterior lateral calf and thigh on the left.  She also describes some pain over the anterior portion of her left leg.  At this point she cannot even walk a block due to this pain.  She does feel better when she leans forward on a shopping cart.\par \par Of note the patient does describe urinary incontinence for the past 2 months.  She states that she can start and stop her urinary flow but there are times when she cannot make it to the bathroom because her feet feel numb.  She describes 2 episodes of incontinence both of which was when she needs to go to the bathroom but can get to the toilet in time.  She denies any saddle anesthesia.  She denies any bowel incontinence.  She has not a scheduled appointment with urology to discuss the symptoms.  Of note the patient has had a history of a vaginal delivery.

## 2021-08-11 ENCOUNTER — NON-APPOINTMENT (OUTPATIENT)
Age: 61
End: 2021-08-11

## 2021-08-11 ENCOUNTER — APPOINTMENT (OUTPATIENT)
Dept: ELECTROPHYSIOLOGY | Facility: CLINIC | Age: 61
End: 2021-08-11
Payer: COMMERCIAL

## 2021-08-11 PROCEDURE — 93298 REM INTERROG DEV EVAL SCRMS: CPT

## 2021-08-11 PROCEDURE — G2066: CPT

## 2021-08-20 ENCOUNTER — APPOINTMENT (OUTPATIENT)
Dept: UROLOGY | Facility: CLINIC | Age: 61
End: 2021-08-20
Payer: COMMERCIAL

## 2021-08-20 ENCOUNTER — RESULT REVIEW (OUTPATIENT)
Age: 61
End: 2021-08-20

## 2021-08-20 ENCOUNTER — OUTPATIENT (OUTPATIENT)
Dept: OUTPATIENT SERVICES | Facility: HOSPITAL | Age: 61
LOS: 1 days | End: 2021-08-20
Payer: COMMERCIAL

## 2021-08-20 ENCOUNTER — APPOINTMENT (OUTPATIENT)
Dept: ULTRASOUND IMAGING | Facility: IMAGING CENTER | Age: 61
End: 2021-08-20
Payer: COMMERCIAL

## 2021-08-20 VITALS
HEIGHT: 67 IN | SYSTOLIC BLOOD PRESSURE: 120 MMHG | OXYGEN SATURATION: 99 % | BODY MASS INDEX: 25.58 KG/M2 | HEART RATE: 65 BPM | RESPIRATION RATE: 16 BRPM | TEMPERATURE: 96.8 F | DIASTOLIC BLOOD PRESSURE: 70 MMHG | WEIGHT: 163 LBS

## 2021-08-20 DIAGNOSIS — R31.29 OTHER MICROSCOPIC HEMATURIA: ICD-10-CM

## 2021-08-20 DIAGNOSIS — Z98.51 TUBAL LIGATION STATUS: Chronic | ICD-10-CM

## 2021-08-20 DIAGNOSIS — Z00.8 ENCOUNTER FOR OTHER GENERAL EXAMINATION: ICD-10-CM

## 2021-08-20 DIAGNOSIS — Z98.891 HISTORY OF UTERINE SCAR FROM PREVIOUS SURGERY: Chronic | ICD-10-CM

## 2021-08-20 DIAGNOSIS — R35.0 FREQUENCY OF MICTURITION: ICD-10-CM

## 2021-08-20 PROCEDURE — 52000 CYSTOURETHROSCOPY: CPT

## 2021-08-20 PROCEDURE — 76770 US EXAM ABDO BACK WALL COMP: CPT

## 2021-08-20 PROCEDURE — 76770 US EXAM ABDO BACK WALL COMP: CPT | Mod: 26

## 2021-09-07 ENCOUNTER — APPOINTMENT (OUTPATIENT)
Dept: ORTHOPEDIC SURGERY | Facility: CLINIC | Age: 61
End: 2021-09-07
Payer: COMMERCIAL

## 2021-09-07 VITALS
DIASTOLIC BLOOD PRESSURE: 65 MMHG | WEIGHT: 163 LBS | HEIGHT: 67 IN | HEART RATE: 84 BPM | BODY MASS INDEX: 25.58 KG/M2 | OXYGEN SATURATION: 98 % | SYSTOLIC BLOOD PRESSURE: 128 MMHG

## 2021-09-07 DIAGNOSIS — M25.559 PAIN IN UNSPECIFIED HIP: ICD-10-CM

## 2021-09-07 PROCEDURE — 20611 DRAIN/INJ JOINT/BURSA W/US: CPT | Mod: LT

## 2021-09-07 PROCEDURE — 99214 OFFICE O/P EST MOD 30 MIN: CPT | Mod: 25

## 2021-09-07 RX ORDER — DICLOFENAC SODIUM 50 MG/1
50 TABLET, DELAYED RELEASE ORAL
Qty: 60 | Refills: 1 | Status: ACTIVE | COMMUNITY
Start: 2021-09-07 | End: 1900-01-01

## 2021-09-07 NOTE — HISTORY OF PRESENT ILLNESS
[de-identified] : CC L hip\par \par HPI 59 yo female presents for PT, CSI f/u of acute onset of 8 months of acute related and constant pain in the lateral greater than groin left hip after a fall on the sidewalk. The pain is worse, and rated a 8 out of 10, described as sharp stabbing burning and throbbing, [without radiation]. Rest makes the pain better and walking makes the pain worse. The patient reports associated symptoms of left lower extremity weakness and difficulty walking. The patient - similar pain previously .\par \par \par Previous treatments include:\par Activity Modification	+\par Ice/Compression 	+\par NSAIDs  		+\par Physical Therapy 	- (did not go)\par Cortisone Injection	+\par Surgery  		-\par \par injection worked for 2-3 weeks moderate\par pain returned\par more pain in groin now then prior\par doing home pt w friend\par \par Review of Systems is positive for the above musculoskeletal symptoms and is otherwise non-contributory for general, constitutional, psychiatric, neurologic, HEENT, cardiac, respiratory, gastrointestinal, reproductive, lymphatic, and dermatologic complaints.\par \par Consult By Dr Loza

## 2021-09-07 NOTE — PHYSICAL EXAM
[de-identified] : Physical Examination\par General: well nourished, in no acute distress, alert and oriented to person, place and time\par Psychiatric: normal mood and affect, no abnormal movements or speech patterns\par Eyes: vision intact - glasses\par Throat: no thyromegaly\par Lymph: no enlarged nodes, no lymphedema in extremity\par Respiratory: no wheezing, no shortness of breath with ambulation\par Cardiac: no cardiac leg swelling, 2+ peripheral pulses\par Neurology: normal gross sensation in extremities to light touch\par Abdomen: soft, non-tender, tympanic, no masses\par \par Musculoskeletal Examination\par Ambulation	+ antalgic gait, + cane assistive devices\par \par Hip			Right			Left\par General\par      Swelling/Deformity	normal			normal	\par      Skin			normal			normal\par      Erythema		-			-\par      Standing Alignment	neutral			neutral\par Range of Motion\par      Flexion		90			90\par      Abduction		30			30\par      Flex ER		45			45\par      Flex IR		15			15\par      Knee        		0 - 130			0 - 130\par Provocative Exam\par      Log Roll		-			-\par      Heel Strike		-			-\par      Fig 4			-			-\par      SLR			-			-\par Palpation\par      Public Symphysis	-			-\par      Groin   	 	-			-\par      Greater Trochanter	-			+\par      Piriformis		-			-\par      SI Joint		-			-\par Strength Examination/Atrophy\par      Hip Flexor		5+			4+\par      Quadriceps		5+			4+\par      Hamstring		5+			4+\par LLE plantar 4+ dorsiflex 5+\par Sensation\par      Deep Peroneal        	normal			normal\par      Superficial Peroneal 	normal			normal\par      Sural  	 	normal			normal\par      Posterior Tibial        	normal			normal\par      Saphenous		normal			normal\par Pulse\par      DP			2+			2+\par  [de-identified] : 2 views of the affected L hip (AP and Frog Lateral )\par 6-29-21\par demonstrate:\par [normal] acetabular morphology\par decreased anterior headneck offset femoral head neck morphology\par no acetabular osteophytes \par no asymmetric joint space loss\par Spherical femoral head

## 2021-09-07 NOTE — DISCUSSION/SUMMARY
[de-identified] : Left greater trochanter bursitis traumatic\par Left hip pain\par \par \par I discussed the findings and history exam and radiology\par \par Physical therapy\par \par The patient was prescribed Diclofenac PO non-steroidal anti-inflammatory medication. 50mg tablets twice daily to be taken for at least 1-2 weeks in a row and then PRN afterwards. Risks and benefits were discussed and include but not limited to renal damage and GI ulceration and bleeding.  They were advised to take with food to limit stomach upset as well as warned to stop the medication if worsening gastric pain or dizziness or other side effects. Also to immediately stop the medication and seek appropriate medical attention if any severe stomach ache, gastritis, black/red vomit, black/red stools or any other medical concern.\par \par Procedure Note:\par \par Verbal consent was obtained for arthrocentesis and intra-articular corticosteroid injection of the LEFT hip, after the risks and benefits were discussed with the patient. Potential adverse effects were discussed including but not limited to bleeding, skin/ joint infection, local skin reactions including bleaching, bruising, stiffness, soreness, vasovagal episodes, transient hyperglycemia, avascular necrosis, pseudo-septic type reactions, post injection joint pain, allergic reaction to product or anesthetic and other rare but potential adverse effects along with benefits including decreased pain and improved stability prior to obtaining verbal informed consent. It was also discussed that for some patients the treatment is ineffective and there are no guarantees that the patient will experience improvement as the result of the injection. In rare occasions the injection can cause worsening of pain.\par \par The use of a Sonosite 5-2 MHz curvilinear transducer with live ultrasound guidance of the hip injection was necessary given the patient's BMI and local body habitus overlying and obscuring the identification of normal body bony anatomy used to identify the injection site and the depth of soft tissue envelope necessitating a longer than normal needle to reach the joint space, and to confirm the location of the needle tip and intra-articular delivery of the medication. Without the use of live ultrasound guidance the injection would have been more difficult and place the patient's neurovascular structures at risk from the longer needle needed to traverse the soft tissue envelope.\par \par The anterolateral injection site was identified using the ultrasound probe to identify the femoral head and anterior neck longitudinally along the femoral neck axis. The injection site was marked and prepped with a ChloraPrep swab and anesthetized with ethylchloride skin anesthesia. Using sterile technique a 20g 3 1/2 in spinal needle with 3 cc total of 1cc 1% lidocaine without epinephrine, 1cc 0.25% Marcaine without epinephrine and 1cc of 40mg/mL Kenalog was passed through the injection site towards the intra-capsular portion of the anterior femoral neck. After penetrating the joint capsule, the medication was injected without resistance under live ultrasound visualization and noted to distend the joint capsule. The injection site was sterilely dressed, there was minimal blood loss. The patient tolerated this procedure without any complications done by myself. Images were recorded and saved.\par \par The patient has been advised that if they notice any worsening of symptoms or any problems to contact me and seek care from a qualified medical professional. The patient was instructed to ice the hip and take NSAID medication on an as needed basis if the patient feels discomfort.\par \par reported near complete resolution of groin pain\par \par Followup 2-3 mo

## 2021-09-13 ENCOUNTER — APPOINTMENT (OUTPATIENT)
Dept: ORTHOPEDIC SURGERY | Facility: CLINIC | Age: 61
End: 2021-09-13
Payer: COMMERCIAL

## 2021-09-13 VITALS
WEIGHT: 163 LBS | SYSTOLIC BLOOD PRESSURE: 128 MMHG | HEART RATE: 84 BPM | HEIGHT: 67 IN | DIASTOLIC BLOOD PRESSURE: 60 MMHG | BODY MASS INDEX: 25.58 KG/M2 | OXYGEN SATURATION: 98 %

## 2021-09-13 PROCEDURE — 99215 OFFICE O/P EST HI 40 MIN: CPT

## 2021-09-15 ENCOUNTER — APPOINTMENT (OUTPATIENT)
Dept: ELECTROPHYSIOLOGY | Facility: CLINIC | Age: 61
End: 2021-09-15
Payer: COMMERCIAL

## 2021-09-15 ENCOUNTER — NON-APPOINTMENT (OUTPATIENT)
Age: 61
End: 2021-09-15

## 2021-09-15 PROCEDURE — 93298 REM INTERROG DEV EVAL SCRMS: CPT

## 2021-09-15 PROCEDURE — G2066: CPT

## 2021-09-22 ENCOUNTER — OUTPATIENT (OUTPATIENT)
Dept: OUTPATIENT SERVICES | Facility: HOSPITAL | Age: 61
LOS: 1 days | End: 2021-09-22
Payer: COMMERCIAL

## 2021-09-22 ENCOUNTER — APPOINTMENT (OUTPATIENT)
Dept: CT IMAGING | Facility: IMAGING CENTER | Age: 61
End: 2021-09-22
Payer: COMMERCIAL

## 2021-09-22 DIAGNOSIS — Z98.51 TUBAL LIGATION STATUS: Chronic | ICD-10-CM

## 2021-09-22 DIAGNOSIS — Z98.891 HISTORY OF UTERINE SCAR FROM PREVIOUS SURGERY: Chronic | ICD-10-CM

## 2021-09-22 DIAGNOSIS — M54.5 LOW BACK PAIN: ICD-10-CM

## 2021-09-22 DIAGNOSIS — Z00.8 ENCOUNTER FOR OTHER GENERAL EXAMINATION: ICD-10-CM

## 2021-09-22 PROCEDURE — 72128 CT CHEST SPINE W/O DYE: CPT

## 2021-09-22 PROCEDURE — 72128 CT CHEST SPINE W/O DYE: CPT | Mod: 26

## 2021-09-27 NOTE — PHYSICAL EXAM
[de-identified] : Cervical Physical Exam\par \par Antalgic gait, overall however improved since initial visit\par \par Station - Normal\par \par Sagittal balance - Normal \par \par Compensatory mechanism? - None\par \par Horizontal gaze - Maintained\par \par \par Reflexes\par Biceps - Normal\par Triceps - Normal\par Brachioradialis - Normal\par Patellar - Normal\par Gastroc - Normal\par Clonus -No\par \par Douglas´s -  positive on the right\par \par Shoulder Exam - Normal\par \par Spurling´s - None\par \par Wrist Pulses -2+ radial/ulnar\par \par Foot Pulses -2+ DP/PT\par \par Cervical range of motion - Normal\par \par Sensation \par C5-T1 sensation intact to light touch bilaterally\par \par L1-S1 sensation intact to light touch bilaterally\par \par Motor\par \par \par 	Deltoid	Biceps	Triceps	WF	WE	IO	\par Right	5/5	5/5	5/5	5/5	5/5	5/5	5/5\par Left	5/5	5/5	5/5	5/5	5/5	5/5	5/5\par \par \par 	IP	Quad	HS	TA	Gastroc	EHL\par Right	5/5	5/5	5/5	5/5	5/5	5/5\par Left	2/5	3/5	3/5	3/5	3/5	3/5 [de-identified] : Lumbar radiographs\par L3-L4 spondylolisthesis noted\par L4-L5 spondylolisthesis noted\par Facet arthropathy noted\par \par Lumbar MRI reviewed\par Bilateral lateral recess stenosis at L3-L4\par Bilateral lateral recess stenosis at L4-L5\par No obvious compression of the cauda equina\par Foraminal stenosis at L4-L5 bilaterally\par \par Thoracic MRI reviewed\par There is an area of the thoracic disc herniation in the lower thoracic spine\par I do note some possible cord signal changes at this level\par The disc herniation is more on the right as compared to the left, her symptoms are more on the left as compared to the right\par \par Cervical MRI reviewed\par Cervical spondylosis noted\par No critical areas of central stenosis

## 2021-09-27 NOTE — HISTORY OF PRESENT ILLNESS
[de-identified] : Today the patient states that she is dealing with significant issues in terms of her balance and her extremity pain.  I did send her to a neurologist after our last appointment evaluate for myelopathy.  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  I am here today to discuss next steps in treatment with the patient.  Currently she is walking with a walker.  She does endorse numbness and tingling in her groin but she denies any bowel bladder incontinence.  She also admits to having issues with weakness.\par \par 07/28/21\par Today the patient states that her symptoms are better since epidural steroid injection.  She is still dealing with lower extremity instability and balance issues.  She denies any new or worsening symptoms.  Fact she states she feels better today.  She denies any bowel bladder issues.  She denies any saddle anesthesia.  She was recently evaluated by urology for possible urinary incontinence but she did not mention this to me today nor did she complain of any issues with urination when I asked her.\par \par 07/07/21\par This is a 60-year-old female that has been dealing with worsening back pain, lower extremity pain, gait instability since February 2021.  Since April 2021 she has had to walk with a cane.  She reports significant issues with left lower extremity weakness and pain.  She also has some right-sided pain but this is not as bad as the left side.  She describes pain over her anterior thighs bilaterally and down her posterior lateral calf and thigh on the left.  She also describes some pain over the anterior portion of her left leg.  At this point she cannot even walk a block due to this pain.  She does feel better when she leans forward on a shopping cart.\par \par Of note the patient does describe urinary incontinence for the past 2 months.  She states that she can start and stop her urinary flow but there are times when she cannot make it to the bathroom because her feet feel numb.  She describes 2 episodes of incontinence both of which was when she needs to go to the bathroom but can get to the toilet in time.  She denies any saddle anesthesia.  She denies any bowel incontinence.  She has not a scheduled appointment with urology to discuss the symptoms.  Of note the patient has had a history of a vaginal delivery.

## 2021-09-27 NOTE — ASSESSMENT
[FreeTextEntry1] : I had a lengthy discussion with the patient regards to her treatment plan and diagnosis.  In my opinion she is dealing with she has a disc herniation at T10-11 concerned this is causing her current symptoms.  Therefore I recommended a 1 level decompression fusion today.  We did discuss that stepwise decline in function related to myelopathy including loss of ambulatory status, issues with bowel bladder motion and extension.  Patient would like to hold off on any sort of aggressive treatment at this point.  She would like some time to think about her diagnosis.  I did  her about the potential catastrophic issues with nonoperative treatment.  If she does elect to pursue nonoperative treatment I will need to follow her very closely to nutrition progress correction.  She was in agreement with this plan.  I also want to obtain a CT scan of her thoracic spine to evaluate for any areas of calcifications in her thoracic spine.  I have asked her to follow-up with me in 1 month time if she would proceed with nonoperative treatment today we can continue to monitor her symptoms closely.  She was in agreement with this plan.\par \par Please note that over 40 minutes of time spent in care of this patient which includes previsit preparation, in person visit, discussion with colleagues, post visit documentation.

## 2021-10-10 ENCOUNTER — FORM ENCOUNTER (OUTPATIENT)
Age: 61
End: 2021-10-10

## 2021-10-18 ENCOUNTER — APPOINTMENT (OUTPATIENT)
Dept: ORTHOPEDIC SURGERY | Facility: CLINIC | Age: 61
End: 2021-10-18
Payer: COMMERCIAL

## 2021-10-18 VITALS
OXYGEN SATURATION: 96 % | DIASTOLIC BLOOD PRESSURE: 72 MMHG | WEIGHT: 162 LBS | HEIGHT: 67 IN | HEART RATE: 76 BPM | SYSTOLIC BLOOD PRESSURE: 139 MMHG | BODY MASS INDEX: 25.43 KG/M2

## 2021-10-18 PROCEDURE — 99215 OFFICE O/P EST HI 40 MIN: CPT

## 2021-10-18 NOTE — PHYSICAL EXAM
[de-identified] : Cervical Physical Exam\par \par Antalgic gait, overall however improved since initial visit\par \par Station - Normal\par \par Sagittal balance - Normal \par \par Compensatory mechanism? - None\par \par Horizontal gaze - Maintained\par \par \par Reflexes\par Biceps - Normal\par Triceps - Normal\par Brachioradialis - Normal\par Patellar - Normal\par Gastroc - Normal\par Clonus -No\par \par Douglas´s -  positive on the right\par \par Shoulder Exam - Normal\par \par Spurling´s - None\par \par Wrist Pulses -2+ radial/ulnar\par \par Foot Pulses -2+ DP/PT\par \par Cervical range of motion - Normal\par \par Sensation \par C5-T1 sensation intact to light touch bilaterally\par \par L1-S1 sensation intact to light touch bilaterally\par \par Motor\par \par \par 	Deltoid	Biceps	Triceps	WF	WE	IO	\par Right	5/5	5/5	5/5	5/5	5/5	5/5	5/5\par Left	5/5	5/5	5/5	5/5	5/5	5/5	5/5\par \par \par 	IP	Quad	HS	TA	Gastroc	EHL\par Right	5/5	5/5	5/5	5/5	5/5	5/5\par Left	2/5	3/5	3/5	3/5	3/5	3/5 [de-identified] : Lumbar radiographs\par L3-L4 spondylolisthesis noted\par L4-L5 spondylolisthesis noted\par Facet arthropathy noted\par \par Lumbar MRI reviewed\par Bilateral lateral recess stenosis at L3-L4\par Bilateral lateral recess stenosis at L4-L5\par No obvious compression of the cauda equina\par Foraminal stenosis at L4-L5 bilaterally\par \par Thoracic MRI reviewed\par There is an area of the thoracic disc herniation in the lower thoracic spine\par I do note some possible cord signal changes at this level\par The disc herniation is more on the right as compared to the left, her symptoms are more on the left as compared to the right\par \par Cervical MRI reviewed\par Cervical spondylosis noted\par No critical areas of central stenosis\par \par CT scan does show a T10-T11 disc herniation

## 2021-10-18 NOTE — ASSESSMENT
[FreeTextEntry1] : I had a long lengthy discussion with both the patient the patient's family in regards to her current treatment plan diagnosis.  She does have a large disc herniation in the thoracic spine.  She is also dealing with symptoms concerning for thoracic spondylotic myelopathy.  As result I think it is critical that we proceed with surgery.  We will have her set up for a T10-12 decompression fusion surgery.  This will be on November 11.  I would like to have her come in on November 8 to  discuss any last-minute questions he might have about the surgical plan.  Today we discussed what the surgery would entail and the length of time the hospital.  We discussed the rationale for treatment and the fact that there is a risk of stepwise decline in function with thoracic spondylotic myelopathy.  She understood this and did agree to proceed with the procedure as planned.  I did write down the diagnosis and the proposed treatment plan.  She will come back on November 8 to discuss any last-minute questions she might have.  Please note that over 40 minutes of time was spent in care this patient which includes previsit preparation, in person visit, post visit documentation.

## 2021-10-18 NOTE — HISTORY OF PRESENT ILLNESS
[de-identified] : Today patient states that she is still having significant issues in terms of her balance and her lower extremity pain.  She does state that she has radiating pain down her bilateral legs, left worse than right.  She also feels that she is much more unsteady on her feet the past several months.  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  She does endorse numbness and tingling in her abdomen today but this is not below saddle anesthesia.  She denies any bowel bladder incontinence.

## 2021-10-26 ENCOUNTER — APPOINTMENT (OUTPATIENT)
Dept: ELECTROPHYSIOLOGY | Facility: CLINIC | Age: 61
End: 2021-10-26
Payer: COMMERCIAL

## 2021-10-26 ENCOUNTER — NON-APPOINTMENT (OUTPATIENT)
Age: 61
End: 2021-10-26

## 2021-10-26 PROCEDURE — 93298 REM INTERROG DEV EVAL SCRMS: CPT | Mod: NC

## 2021-10-26 PROCEDURE — G2066: CPT

## 2021-10-28 ENCOUNTER — OUTPATIENT (OUTPATIENT)
Dept: OUTPATIENT SERVICES | Facility: HOSPITAL | Age: 61
LOS: 1 days | End: 2021-10-28
Payer: COMMERCIAL

## 2021-10-28 VITALS
DIASTOLIC BLOOD PRESSURE: 73 MMHG | RESPIRATION RATE: 18 BRPM | HEIGHT: 67 IN | OXYGEN SATURATION: 96 % | HEART RATE: 88 BPM | WEIGHT: 160.06 LBS | TEMPERATURE: 98 F | SYSTOLIC BLOOD PRESSURE: 135 MMHG

## 2021-10-28 DIAGNOSIS — Z98.891 HISTORY OF UTERINE SCAR FROM PREVIOUS SURGERY: Chronic | ICD-10-CM

## 2021-10-28 DIAGNOSIS — Z29.9 ENCOUNTER FOR PROPHYLACTIC MEASURES, UNSPECIFIED: ICD-10-CM

## 2021-10-28 DIAGNOSIS — Z98.51 TUBAL LIGATION STATUS: Chronic | ICD-10-CM

## 2021-10-28 DIAGNOSIS — I25.10 ATHEROSCLEROTIC HEART DISEASE OF NATIVE CORONARY ARTERY WITHOUT ANGINA PECTORIS: ICD-10-CM

## 2021-10-28 DIAGNOSIS — Z01.818 ENCOUNTER FOR OTHER PREPROCEDURAL EXAMINATION: ICD-10-CM

## 2021-10-28 DIAGNOSIS — Z98.890 OTHER SPECIFIED POSTPROCEDURAL STATES: Chronic | ICD-10-CM

## 2021-10-28 DIAGNOSIS — J45.909 UNSPECIFIED ASTHMA, UNCOMPLICATED: ICD-10-CM

## 2021-10-28 DIAGNOSIS — M47.814 SPONDYLOSIS WITHOUT MYELOPATHY OR RADICULOPATHY, THORACIC REGION: ICD-10-CM

## 2021-10-28 DIAGNOSIS — Z98.49 CATARACT EXTRACTION STATUS, UNSPECIFIED EYE: Chronic | ICD-10-CM

## 2021-10-28 DIAGNOSIS — M47.816 SPONDYLOSIS WITHOUT MYELOPATHY OR RADICULOPATHY, LUMBAR REGION: ICD-10-CM

## 2021-10-28 LAB
ANION GAP SERPL CALC-SCNC: 12 MMOL/L — SIGNIFICANT CHANGE UP (ref 5–17)
BLD GP AB SCN SERPL QL: NEGATIVE — SIGNIFICANT CHANGE UP
BUN SERPL-MCNC: 18 MG/DL — SIGNIFICANT CHANGE UP (ref 7–23)
CALCIUM SERPL-MCNC: 9.6 MG/DL — SIGNIFICANT CHANGE UP (ref 8.4–10.5)
CHLORIDE SERPL-SCNC: 108 MMOL/L — SIGNIFICANT CHANGE UP (ref 96–108)
CO2 SERPL-SCNC: 23 MMOL/L — SIGNIFICANT CHANGE UP (ref 22–31)
CREAT SERPL-MCNC: 0.89 MG/DL — SIGNIFICANT CHANGE UP (ref 0.5–1.3)
GLUCOSE SERPL-MCNC: 103 MG/DL — HIGH (ref 70–99)
HCT VFR BLD CALC: 42.1 % — SIGNIFICANT CHANGE UP (ref 34.5–45)
HGB BLD-MCNC: 13 G/DL — SIGNIFICANT CHANGE UP (ref 11.5–15.5)
MCHC RBC-ENTMCNC: 27.9 PG — SIGNIFICANT CHANGE UP (ref 27–34)
MCHC RBC-ENTMCNC: 30.9 GM/DL — LOW (ref 32–36)
MCV RBC AUTO: 90.3 FL — SIGNIFICANT CHANGE UP (ref 80–100)
MRSA PCR RESULT.: SIGNIFICANT CHANGE UP
NRBC # BLD: 0 /100 WBCS — SIGNIFICANT CHANGE UP (ref 0–0)
PLATELET # BLD AUTO: 251 K/UL — SIGNIFICANT CHANGE UP (ref 150–400)
POTASSIUM SERPL-MCNC: 4.4 MMOL/L — SIGNIFICANT CHANGE UP (ref 3.5–5.3)
POTASSIUM SERPL-SCNC: 4.4 MMOL/L — SIGNIFICANT CHANGE UP (ref 3.5–5.3)
RBC # BLD: 4.66 M/UL — SIGNIFICANT CHANGE UP (ref 3.8–5.2)
RBC # FLD: 13.1 % — SIGNIFICANT CHANGE UP (ref 10.3–14.5)
RH IG SCN BLD-IMP: POSITIVE — SIGNIFICANT CHANGE UP
S AUREUS DNA NOSE QL NAA+PROBE: SIGNIFICANT CHANGE UP
SODIUM SERPL-SCNC: 143 MMOL/L — SIGNIFICANT CHANGE UP (ref 135–145)
WBC # BLD: 5.27 K/UL — SIGNIFICANT CHANGE UP (ref 3.8–10.5)
WBC # FLD AUTO: 5.27 K/UL — SIGNIFICANT CHANGE UP (ref 3.8–10.5)

## 2021-10-28 PROCEDURE — 86901 BLOOD TYPING SEROLOGIC RH(D): CPT

## 2021-10-28 PROCEDURE — 86900 BLOOD TYPING SEROLOGIC ABO: CPT

## 2021-10-28 PROCEDURE — 85027 COMPLETE CBC AUTOMATED: CPT

## 2021-10-28 PROCEDURE — 86850 RBC ANTIBODY SCREEN: CPT

## 2021-10-28 PROCEDURE — G0463: CPT

## 2021-10-28 PROCEDURE — 87641 MR-STAPH DNA AMP PROBE: CPT

## 2021-10-28 PROCEDURE — 87640 STAPH A DNA AMP PROBE: CPT

## 2021-10-28 PROCEDURE — 80048 BASIC METABOLIC PNL TOTAL CA: CPT

## 2021-10-28 RX ORDER — CEFAZOLIN SODIUM 1 G
2000 VIAL (EA) INJECTION ONCE
Refills: 0 | Status: DISCONTINUED | OUTPATIENT
Start: 2021-11-11 | End: 2021-11-17

## 2021-10-28 NOTE — H&P PST ADULT - REASON FOR ADMISSION
" I have leg pain with numbness and tingling and difficulty walking, I need surgery done to be pain free and walk better"

## 2021-10-28 NOTE — H&P PST ADULT - NSWEIGHTCALCTOOLDRUG_GEN_A_CORE
Pt has not attended scheduled occupational therapy sessions. Encourage attendance and participation. Pt will be given self-assessment form, and OT staff will explain the purpose of including them in their treatment plan and offer options for meeting their needs.      used

## 2021-10-28 NOTE — H&P PST ADULT - TERM DELIVERIES, OB PROFILE
Post-Care Instructions: I reviewed with the patient in detail post-care instructions. Patient is to wear sunprotection, and avoid picking at any of the treated lesions. Pt may apply Vaseline to crusted or scabbing areas. Number Of Freeze-Thaw Cycles: 1 freeze-thaw cycle Consent: Verbal consent was obtained including but not limited to risks of crusting, scabbing, blistering, scarring, darker or lighter pigmentary change, recurrence, incomplete removal and infection. Detail Level: Simple Consent: The patient's consent was obtained including but not limited to risks of crusting, scabbing, blistering, scarring, darker or lighter pigmentary change, recurrence, incomplete removal and infection. Include Z78.9 (Other Specified Conditions Influencing Health Status) As An Associated Diagnosis?: No Medical Necessity Clause: This procedure was medically necessary because the lesions that were treated were: Duration Of Freeze Thaw-Cycle (Seconds): 0 Detail Level: Detailed Medical Necessity Information: It is in your best interest to select a reason for this procedure from the list below. All of these items fulfill various CMS LCD requirements except the new and changing color options. 2

## 2021-10-28 NOTE — H&P PST ADULT - ATTENDING COMMENTS
This is a 60 year old female with symptoms and imaging findings consistent with thoracic myelopathy in the setting of a T10-T11 disc herniation with severe stenosis. We will proceed with a T10-T11 possible T10-T12 decompression/fusion procedure. Risks and benefits explained to the patient in great detail. These risks include but are not limited to pain, need for reoperation, injury to surrounding nerves/arteries/veins, paralysis, nerve root injury, csf leak, dural tear, instrumentation failure, nonunion, non resolution of symptoms, dvt, pe, infection, as well as other risks. I also went over the natural history of myelopathy and the fact that the goal of surgery would be to halt the progression of symptoms. The patient did agree to proceed with the planned procedure, proper informed consent has been obtained.

## 2021-10-28 NOTE — H&P PST ADULT - NSICDXPASTMEDICALHX_GEN_ALL_CORE_FT
PAST MEDICAL HISTORY:  Asthma     CHF (congestive heart failure) chronic    Hyperlipidemia     Hypertension     Idiopathic neuropathy     LBBB (left bundle branch block)     Migraines     Myocardial infarction, unspecified MI type, unspecified artery 2010    TIA (transient ischemic attack) 2018 s/p loop recorder

## 2021-10-28 NOTE — H&P PST ADULT - HISTORY OF PRESENT ILLNESS
This is a 59 y/o female with PMH of HTN, hyperlipidemia chronic CHF,  This is a 61 y/o female with PMH of HTN, hyperlipidemia chronic CHF, CAD s/p MI 2010, TIA s/p loop recorder placement 2018 This is a 61 y/o female with PMH of HTN, hyperlipidemia chronic CHF, CAD s/p MI 2010, TIA s/p loop recorder placement 2018, left BBB, c/o both leg pain associated with paresthesia and difficulty walking, she presents today for  T10-T12 decompression/fusion  covid swab to be done  11/8 at [facility], denies fever, cough, SOB, change in taste/smell  pt will see PCP and cardiologist for evaluation prior to surgery This is a 61 y/o female with PMH of HTN, hyperlipidemia chronic CHF, CAD s/p MI 2010, TIA s/p loop recorder placement 2018, left BBB, asthma,  c/o both leg pain associated with paresthesia and difficulty walking, she presents today for  T10-T12 decompression/fusion  covid swab to be done  11/8 at [facility], denies fever, cough, SOB, change in taste/smell  pt will see PCP and cardiologist for evaluation prior to surgery , stop ASA 7 days prior to surgery

## 2021-10-28 NOTE — H&P PST ADULT - NSICDXPASTSURGICALHX_GEN_ALL_CORE_FT
PAST SURGICAL HISTORY:  History of      History of tubal ligation      PAST SURGICAL HISTORY:  History of      History of cataract surgery     History of tubal ligation     S/P lumpectomy, left breast

## 2021-10-28 NOTE — H&P PST ADULT - ASSESSMENT
ABIMBOLAI VTE 2.0 SCORE [CLOT updated 2019]    AGE RELATED RISK FACTORS                                                       MOBILITY RELATED FACTORS  [ ] Age 41-60 years                                            (1 Point)                    [ ] Bed rest                                                        (1 Point)  [x] Age: 61-74 years                                           (2 Points)                  [ ] Plaster cast                                                   (2 Points)  [ ] Age= 75 years                                              (3 Points)                    [ ] Bed bound for more than 72 hours                 (2 Points)    DISEASE RELATED RISK FACTORS                                               GENDER SPECIFIC FACTORS  [ ] Edema in the lower extremities                       (1 Point)              [ ] Pregnancy                                                     (1 Point)  [ ] Varicose veins                                               (1 Point)                     [ ] Post-partum < 6 weeks                                   (1 Point)             [x ] BMI > 25 Kg/m2                                            (1 Point)                     [ ] Hormonal therapy  or oral contraception          (1 Point)                 [ ] Sepsis (in the previous month)                        (1 Point)               [ ] History of pregnancy complications                 (1 point)  [ ] Pneumonia or serious lung disease                                               [ ] Unexplained or recurrent                     (1 Point)           (in the previous month)                               (1 Point)  [ ] Abnormal pulmonary function test                     (1 Point)                 SURGERY RELATED RISK FACTORS  [ ] Acute myocardial infarction                              (1 Point)               [ ]  Section                                             (1 Point)  [ ] Congestive heart failure (in the previous month)  (1 Point)      [ ] Minor surgery                                                  (1 Point)   [ ] Inflammatory bowel disease                             (1 Point)               [ ] Arthroscopic surgery                                        (2 Points)  [ ] Central venous access                                      (2 Points)                [x        ] General surgery lasting more than 45 minutes (2 points)  [ ] Malignancy- Present or previous                   (2 Points)                [ ] Elective arthroplasty                                         (5 points)    [ ] Stroke (in the previous month)                          (5 Points)                                                                                                                                                           HEMATOLOGY RELATED FACTORS                                                 TRAUMA RELATED RISK FACTORS  [ ] Prior episodes of VTE                                     (3 Points)                [ ] Fracture of the hip, pelvis, or leg                       (5 Points)  [ ] Positive family history for VTE                         (3 Points)             [ ] Acute spinal cord injury (in the previous month)  (5 Points)  [ ] Prothrombin 85848 A                                     (3 Points)               [ ] Paralysis  (less than 1 month)                             (5 Points)  [ ] Factor V Leiden                                             (3 Points)                  [ ] Multiple Trauma within 1 month                        (5 Points)  [ ] Lupus anticoagulants                                     (3 Points)                                                           [ ] Anticardiolipin antibodies                               (3 Points)                                                       [ ] High homocysteine in the blood                      (3 Points)                                             [ ] Other congenital or acquired thrombophilia      (3 Points)                                                [ ] Heparin induced thrombocytopenia                  (3 Points)                                     Total Score [     5     ] ABIMBOLAI VTE 2.0 SCORE [CLOT updated 2019]    AGE RELATED RISK FACTORS                                                       MOBILITY RELATED FACTORS  [ ] Age 41-60 years                                            (1 Point)                    [ ] Bed rest                                                        (1 Point)  [x] Age: 61-74 years                                           (2 Points)                  [ ] Plaster cast                                                   (2 Points)  [ ] Age= 75 years                                              (3 Points)                    [ ] Bed bound for more than 72 hours                 (2 Points)    DISEASE RELATED RISK FACTORS                                               GENDER SPECIFIC FACTORS  [ ] Edema in the lower extremities                       (1 Point)              [ ] Pregnancy                                                     (1 Point)  [ ] Varicose veins                                               (1 Point)                     [ ] Post-partum < 6 weeks                                   (1 Point)             [x ] BMI > 25 Kg/m2                                            (1 Point)                     [ ] Hormonal therapy  or oral contraception          (1 Point)                 [ ] Sepsis (in the previous month)                        (1 Point)               [ ] History of pregnancy complications                 (1 point)  [ ] Pneumonia or serious lung disease                                               [ ] Unexplained or recurrent                     (1 Point)           (in the previous month)                               (1 Point)  [ ] Abnormal pulmonary function test                     (1 Point)                 SURGERY RELATED RISK FACTORS  [ ] Acute myocardial infarction                              (1 Point)               [ ]  Section                                             (1 Point)  [x ] Congestive heart failure (in the previous month)  (1 Point)      [ ] Minor surgery                                                  (1 Point)   [ ] Inflammatory bowel disease                             (1 Point)               [ ] Arthroscopic surgery                                        (2 Points)  [ ] Central venous access                                      (2 Points)                [x        ] General surgery lasting more than 45 minutes (2 points)  [ ] Malignancy- Present or previous                   (2 Points)                [ ] Elective arthroplasty                                         (5 points)    [ ] Stroke (in the previous month)                          (5 Points)                                                                                                                                                           HEMATOLOGY RELATED FACTORS                                                 TRAUMA RELATED RISK FACTORS  [ ] Prior episodes of VTE                                     (3 Points)                [ ] Fracture of the hip, pelvis, or leg                       (5 Points)  [ ] Positive family history for VTE                         (3 Points)             [ ] Acute spinal cord injury (in the previous month)  (5 Points)  [ ] Prothrombin 32498 A                                     (3 Points)               [ ] Paralysis  (less than 1 month)                             (5 Points)  [ ] Factor V Leiden                                             (3 Points)                  [ ] Multiple Trauma within 1 month                        (5 Points)  [ ] Lupus anticoagulants                                     (3 Points)                                                           [ ] Anticardiolipin antibodies                               (3 Points)                                                       [ ] High homocysteine in the blood                      (3 Points)                                             [ ] Other congenital or acquired thrombophilia      (3 Points)                                                [ ] Heparin induced thrombocytopenia                  (3 Points)                                     Total Score [     6   ]

## 2021-11-04 PROBLEM — I21.9 ACUTE MYOCARDIAL INFARCTION, UNSPECIFIED: Chronic | Status: ACTIVE | Noted: 2018-11-19

## 2021-11-04 PROBLEM — I50.9 HEART FAILURE, UNSPECIFIED: Chronic | Status: ACTIVE | Noted: 2018-11-19

## 2021-11-04 PROBLEM — G45.9 TRANSIENT CEREBRAL ISCHEMIC ATTACK, UNSPECIFIED: Chronic | Status: ACTIVE | Noted: 2021-10-28

## 2021-11-05 ENCOUNTER — APPOINTMENT (OUTPATIENT)
Dept: ELECTROPHYSIOLOGY | Facility: CLINIC | Age: 61
End: 2021-11-05
Payer: COMMERCIAL

## 2021-11-05 VITALS
RESPIRATION RATE: 16 BRPM | HEIGHT: 67 IN | HEART RATE: 60 BPM | BODY MASS INDEX: 25.43 KG/M2 | DIASTOLIC BLOOD PRESSURE: 78 MMHG | WEIGHT: 162 LBS | SYSTOLIC BLOOD PRESSURE: 129 MMHG | TEMPERATURE: 97.4 F | OXYGEN SATURATION: 99 %

## 2021-11-05 PROCEDURE — 99213 OFFICE O/P EST LOW 20 MIN: CPT

## 2021-11-05 NOTE — CARDIOLOGY SUMMARY
[de-identified] : 11/5/2021: LVEF 50% \par \par 11/20/2018, 1. Normal trileaflet aortic valve.2. Moderate concentric left ventricular hypertrophy.3. Low normal left ventricular systolic function. Nosegmental wall motion abnormalities. Septal motion isconsistent with LBBB.4. Normal right ventricular size and function.5. Agitated saline injection demonstrates no obciousevidence of a patent foramen ovale.6. Normal pericardium with trace pericardial effusion. LVEF 51%.

## 2021-11-05 NOTE — HISTORY OF PRESENT ILLNESS
[FreeTextEntry1] : Coco Mendoza is a 59y/o woman with Hx of HTN, CAD s/p MI (2010), asthma, and TIA s/p ILR placement (2018) who presents today for routine f/u. Admits doing well with no cardiac issues or complaints. Denies chest pain, palpitations, SOB, syncope or near syncope. ILR with no evidence of afib. Saw Dr. Galloway prior to appointment for cardiac clearance for pending back surgery. ECHO was done with LVEF 50% as per patient.

## 2021-11-05 NOTE — DISCUSSION/SUMMARY
[FreeTextEntry1] : Impression:\par \par 1. PAT: ILR in place with no recent AT noted. Had 2 min of AT back in 2019, on metoprolol. From an EP standpoint, may undergo back surgery as required. \par ILR nearing EVELIN. Discussed explant vs re implant of device. Will consider when EVELIN is triggered. \par \par 2. HTN: resume oral antihypertensives as prescribed. Encouraged heart healthy diet, sodium restriction, and weight loss. Continue regular f/u with Cardiologist for further HTN management.\par \par 3. HLD: resume statin therapy as prescribed and regular f/u with Cardiologist for routine lipid monitoring and management.\par \par Sincerely,\par \par Elias Johnson MD

## 2021-11-05 NOTE — REASON FOR VISIT
[Arrhythmia/ECG Abnorrmalities] : arrhythmia/ECG abnormalities [FreeTextEntry3] : Alexei Galloway MD

## 2021-11-08 ENCOUNTER — OUTPATIENT (OUTPATIENT)
Dept: OUTPATIENT SERVICES | Facility: HOSPITAL | Age: 61
LOS: 1 days | End: 2021-11-08
Payer: COMMERCIAL

## 2021-11-08 ENCOUNTER — APPOINTMENT (OUTPATIENT)
Dept: ORTHOPEDIC SURGERY | Facility: CLINIC | Age: 61
End: 2021-11-08
Payer: COMMERCIAL

## 2021-11-08 VITALS
WEIGHT: 160 LBS | SYSTOLIC BLOOD PRESSURE: 114 MMHG | DIASTOLIC BLOOD PRESSURE: 64 MMHG | BODY MASS INDEX: 25.11 KG/M2 | OXYGEN SATURATION: 97 % | HEART RATE: 76 BPM | HEIGHT: 67 IN

## 2021-11-08 DIAGNOSIS — Z98.891 HISTORY OF UTERINE SCAR FROM PREVIOUS SURGERY: Chronic | ICD-10-CM

## 2021-11-08 DIAGNOSIS — Z98.890 OTHER SPECIFIED POSTPROCEDURAL STATES: Chronic | ICD-10-CM

## 2021-11-08 DIAGNOSIS — Z98.51 TUBAL LIGATION STATUS: Chronic | ICD-10-CM

## 2021-11-08 DIAGNOSIS — Z98.49 CATARACT EXTRACTION STATUS, UNSPECIFIED EYE: Chronic | ICD-10-CM

## 2021-11-08 DIAGNOSIS — Z11.52 ENCOUNTER FOR SCREENING FOR COVID-19: ICD-10-CM

## 2021-11-08 LAB — SARS-COV-2 RNA SPEC QL NAA+PROBE: SIGNIFICANT CHANGE UP

## 2021-11-08 PROCEDURE — U0005: CPT

## 2021-11-08 PROCEDURE — C9803: CPT

## 2021-11-08 PROCEDURE — U0003: CPT

## 2021-11-08 PROCEDURE — 99215 OFFICE O/P EST HI 40 MIN: CPT

## 2021-11-08 NOTE — ASSESSMENT
[FreeTextEntry1] : I had a long and lengthy discussion with the patient in regards to her treatment plan and diagnosis.  Both her neurologist and myself agree that she has symptoms concerning for thoracic myelopathy in the setting of a large thoracic disc herniation.  This thoracic disc does cause severe spinal cord compression.  As result I would like to proceed with a T10-T12 decompression fusion procedure.  This would allow us to completely decompress her neural elements posteriorly and provide some stability after the significant amount of facet decompression I have to do in order safely relieve pressure from the patient's spinal cord. Risks and benefits of the procedure were explained to the patient in great detail.  \par \par Please note I had a long discussion with the patient in regards to specific risks of the surgery.  These risks included but were not limited to pain, need for reoperation, nonresolution of symptoms, injury to surrounding nerves arteries and veins, paralysis, nerve root injury, CSF leak, instrumentation failure, nonunion, DVT, PE, CSF leak, dural tear, infection, wound dehiscence as well as others.  The patient did agree to proceed with the procedure as planned.  Proper informed consent was obtained.\par \par Please note I also discussed with the patient the postoperative recovery course.  All questions were answered.  Please note that over 40 minutes of time was spent in care of this patient which includes previsit preparation, in person visit, post visit documentation.

## 2021-11-08 NOTE — PHYSICAL EXAM
[de-identified] : Cervical Physical Exam\par \par Antalgic gait, overall however improved since initial visit.  She is walking with a cane\par \par Station - Normal\par \par Sagittal balance - Normal \par \par Compensatory mechanism? - None\par \par Horizontal gaze - Maintained\par \par \par Reflexes\par Biceps - Normal\par Triceps - Normal\par Brachioradialis - Normal\par Patellar - Normal\par Gastroc - Normal\par Clonus -No\par \par Douglas´s -  positive on the right\par \par Shoulder Exam - Normal\par \par Spurling´s - None\par \par Wrist Pulses -2+ radial/ulnar\par \par Foot Pulses -2+ DP/PT\par \par Cervical range of motion - Normal\par \par Sensation \par C5-T1 sensation intact to light touch bilaterally\par \par L1-S1 sensation intact to light touch bilaterally\par \par Motor\par \par \par 	Deltoid	Biceps	Triceps	WF	WE	IO	\par Right	5/5	5/5	5/5	5/5	5/5	5/5	5/5\par Left	5/5	5/5	5/5	5/5	5/5	5/5	5/5\par \par \par 	IP	Quad	HS	TA	Gastroc	EHL\par Right	2/5	3/5	5/5	5/5	5/5	5/5\par Left	5/5	3/5	3/5	3/5	3/5	3/5 [de-identified] : Lumbar radiographs\par L3-L4 spondylolisthesis noted\par L4-L5 spondylolisthesis noted\par Facet arthropathy noted\par \par Lumbar MRI reviewed\par Bilateral lateral recess stenosis at L3-L4\par Bilateral lateral recess stenosis at L4-L5\par No obvious compression of the cauda equina\par Foraminal stenosis at L4-L5 bilaterally\par \par Thoracic MRI reviewed\par There is an area of the thoracic disc herniation in the lower thoracic spine\par I do note some possible cord signal changes at this level\par The disc herniation is more on the right as compared to the left, her symptoms are more on the left as compared to the right\par \par Cervical MRI reviewed\par Cervical spondylosis noted\par No critical areas of central stenosis\par \par CT scan does show a T10-T11 disc herniation

## 2021-11-08 NOTE — HISTORY OF PRESENT ILLNESS
[de-identified] : Today the patient states that she is still dealing with significant amount of balance issues.  She is dealing with radicular type pain, right worse than left.  She denies any bowel bladder issues.  She denies any saddle anesthesia.  She is currently walking with a cane. \par \par 10/18/21\par Today patient states that she is still having significant issues in terms of her balance and her lower extremity pain.  She does state that she has radiating pain down her bilateral legs, left worse than right.  She also feels that she is much more unsteady on her feet the past several months.  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  She does endorse numbness and tingling in her abdomen today but this is not below saddle anesthesia.  She denies any bowel bladder incontinence.

## 2021-11-09 ENCOUNTER — APPOINTMENT (OUTPATIENT)
Dept: ORTHOPEDIC SURGERY | Facility: CLINIC | Age: 61
End: 2021-11-09
Payer: COMMERCIAL

## 2021-11-09 VITALS — SYSTOLIC BLOOD PRESSURE: 115 MMHG | HEART RATE: 80 BPM | OXYGEN SATURATION: 97 % | DIASTOLIC BLOOD PRESSURE: 71 MMHG

## 2021-11-09 DIAGNOSIS — M25.852 OTHER SPECIFIED JOINT DISORDERS, LEFT HIP: ICD-10-CM

## 2021-11-09 DIAGNOSIS — M70.62 TROCHANTERIC BURSITIS, LEFT HIP: ICD-10-CM

## 2021-11-09 PROCEDURE — 99213 OFFICE O/P EST LOW 20 MIN: CPT

## 2021-11-09 NOTE — HISTORY OF PRESENT ILLNESS
[de-identified] : CC L hip\par \par HPI 61 yo female presents for PT, hip joint CSI f/u of acute onset of 8 months of acute related and constant pain in the lateral greater than groin left hip after a fall on the sidewalk. The pain is worse, and rated a 8 out of 10, described as sharp stabbing burning and throbbing, [without radiation]. Rest makes the pain better and walking makes the pain worse. The patient reports associated symptoms of left lower extremity weakness and difficulty walking. The patient - similar pain previously .\par \par \par Previous treatments include:\par Activity Modification	+\par Ice/Compression 	+\par NSAIDs  		+\par Physical Therapy 	+ formal pt hellped a lot more\par Cortisone Injection	+ troch mild help short term, hip joint 6-8 weeks good help\par Surgery  		-\par \par hip injection worked decent 6-8 weeks\par pain better than baseline w pt\par having back surgery w Dr Salazar in a few days\par \par Review of Systems is positive for the above musculoskeletal symptoms and is otherwise non-contributory for general, constitutional, psychiatric, neurologic, HEENT, cardiac, respiratory, gastrointestinal, reproductive, lymphatic, and dermatologic complaints.\par \par Consult By Dr Loza

## 2021-11-09 NOTE — PHYSICAL EXAM
[de-identified] : Physical Examination\par General: well nourished, in no acute distress, alert and oriented to person, place and time\par Psychiatric: normal mood and affect, no abnormal movements or speech patterns\par Eyes: vision intact - glasses\par \par Musculoskeletal Examination\par Ambulation	+ antalgic gait, - cane assistive devices\par \par Hip			Right			Left\par General\par      Swelling/Deformity	normal			normal	\par      Skin			normal			normal\par      Erythema		-			-\par      Standing Alignment	neutral			neutral\par Range of Motion\par      Flexion		90			90\par      Abduction		30			30\par      Flex ER		45			45\par      Flex IR		15			15\par      Knee        		0 - 130			0 - 130\par Provocative Exam\par      Log Roll		-			-\par      Heel Strike		-			-\par      Fig 4			-			-\par      SLR			-			-\par Palpation\par      Public Symphysis	-			-\par      Groin   	 	-			+\par      Greater Trochanter	-			+\par      Piriformis		-			-\par      SI Joint		-			-\par Strength Examination/Atrophy\par      Hip Flexor		5+			4+\par      Quadriceps		5+			4+\par      Hamstring		5+			4+\par LLE plantar 4+ dorsiflex 5+\par Sensation\par      Deep Peroneal        	normal			normal\par      Superficial Peroneal 	normal			normal\par      Sural  	 	normal			normal\par      Posterior Tibial        	normal			normal\par      Saphenous		normal			normal\par Pulse\par      DP			2+			2+\par  [de-identified] : 2 views of the affected L hip (AP and Frog Lateral )\par 6-29-21\par demonstrate:\par [normal] acetabular morphology\par decreased anterior headneck offset femoral head neck morphology\par no acetabular osteophytes \par no asymmetric joint space loss\par Spherical femoral head

## 2021-11-09 NOTE — DISCUSSION/SUMMARY
[de-identified] : Left greater trochanter bursitis traumatic\par Left hip femoroactabular impingement\par \par \par I discussed the findings and history exam and radiology\par \par hold pt and nsaids for back surgery\par \par discussed option prcoeeding given hback surgery\par recommend complete surgery and focus on PT for spine. then when recovered fu for re-eval to see how much hip pain improved w spine surgery\par some contribution of pain from hip likely given improivement w injection\par \par Followup when back is recovered

## 2021-11-10 ENCOUNTER — TRANSCRIPTION ENCOUNTER (OUTPATIENT)
Age: 61
End: 2021-11-10

## 2021-11-11 ENCOUNTER — APPOINTMENT (OUTPATIENT)
Dept: ORTHOPEDIC SURGERY | Facility: HOSPITAL | Age: 61
End: 2021-11-11

## 2021-11-11 ENCOUNTER — RESULT REVIEW (OUTPATIENT)
Age: 61
End: 2021-11-11

## 2021-11-11 ENCOUNTER — INPATIENT (INPATIENT)
Facility: HOSPITAL | Age: 61
LOS: 5 days | Discharge: NURSING, REHAB OR LONG-TERM CARE | DRG: 460 | End: 2021-11-17
Attending: GENERAL PRACTICE | Admitting: GENERAL PRACTICE
Payer: COMMERCIAL

## 2021-11-11 VITALS
WEIGHT: 160.06 LBS | HEART RATE: 68 BPM | HEIGHT: 67 IN | RESPIRATION RATE: 18 BRPM | SYSTOLIC BLOOD PRESSURE: 120 MMHG | OXYGEN SATURATION: 99 % | TEMPERATURE: 98 F | DIASTOLIC BLOOD PRESSURE: 73 MMHG

## 2021-11-11 DIAGNOSIS — Z98.51 TUBAL LIGATION STATUS: Chronic | ICD-10-CM

## 2021-11-11 DIAGNOSIS — Z98.890 OTHER SPECIFIED POSTPROCEDURAL STATES: Chronic | ICD-10-CM

## 2021-11-11 DIAGNOSIS — Z98.891 HISTORY OF UTERINE SCAR FROM PREVIOUS SURGERY: Chronic | ICD-10-CM

## 2021-11-11 DIAGNOSIS — M47.814 SPONDYLOSIS WITHOUT MYELOPATHY OR RADICULOPATHY, THORACIC REGION: ICD-10-CM

## 2021-11-11 DIAGNOSIS — Z98.49 CATARACT EXTRACTION STATUS, UNSPECIFIED EYE: Chronic | ICD-10-CM

## 2021-11-11 LAB
ANION GAP SERPL CALC-SCNC: 13 MMOL/L — SIGNIFICANT CHANGE UP (ref 5–17)
BASOPHILS # BLD AUTO: 0.03 K/UL — SIGNIFICANT CHANGE UP (ref 0–0.2)
BASOPHILS NFR BLD AUTO: 0.4 % — SIGNIFICANT CHANGE UP (ref 0–2)
BUN SERPL-MCNC: 10 MG/DL — SIGNIFICANT CHANGE UP (ref 7–23)
CALCIUM SERPL-MCNC: 8.2 MG/DL — LOW (ref 8.4–10.5)
CHLORIDE SERPL-SCNC: 111 MMOL/L — HIGH (ref 96–108)
CO2 SERPL-SCNC: 21 MMOL/L — LOW (ref 22–31)
CREAT SERPL-MCNC: 0.77 MG/DL — SIGNIFICANT CHANGE UP (ref 0.5–1.3)
EOSINOPHIL # BLD AUTO: 0 K/UL — SIGNIFICANT CHANGE UP (ref 0–0.5)
EOSINOPHIL NFR BLD AUTO: 0 % — SIGNIFICANT CHANGE UP (ref 0–6)
GAS PNL BLDA: SIGNIFICANT CHANGE UP
GLUCOSE SERPL-MCNC: 154 MG/DL — HIGH (ref 70–99)
HCT VFR BLD CALC: 34.5 % — SIGNIFICANT CHANGE UP (ref 34.5–45)
HGB BLD-MCNC: 10.8 G/DL — LOW (ref 11.5–15.5)
IMM GRANULOCYTES NFR BLD AUTO: 0.7 % — SIGNIFICANT CHANGE UP (ref 0–1.5)
LYMPHOCYTES # BLD AUTO: 0.73 K/UL — LOW (ref 1–3.3)
LYMPHOCYTES # BLD AUTO: 10.4 % — LOW (ref 13–44)
MCHC RBC-ENTMCNC: 28.3 PG — SIGNIFICANT CHANGE UP (ref 27–34)
MCHC RBC-ENTMCNC: 31.3 GM/DL — LOW (ref 32–36)
MCV RBC AUTO: 90.6 FL — SIGNIFICANT CHANGE UP (ref 80–100)
MONOCYTES # BLD AUTO: 0.17 K/UL — SIGNIFICANT CHANGE UP (ref 0–0.9)
MONOCYTES NFR BLD AUTO: 2.4 % — SIGNIFICANT CHANGE UP (ref 2–14)
NEUTROPHILS # BLD AUTO: 6.01 K/UL — SIGNIFICANT CHANGE UP (ref 1.8–7.4)
NEUTROPHILS NFR BLD AUTO: 86.1 % — HIGH (ref 43–77)
NRBC # BLD: 0 /100 WBCS — SIGNIFICANT CHANGE UP (ref 0–0)
PLATELET # BLD AUTO: 214 K/UL — SIGNIFICANT CHANGE UP (ref 150–400)
POTASSIUM SERPL-MCNC: 3.9 MMOL/L — SIGNIFICANT CHANGE UP (ref 3.5–5.3)
POTASSIUM SERPL-SCNC: 3.9 MMOL/L — SIGNIFICANT CHANGE UP (ref 3.5–5.3)
RBC # BLD: 3.81 M/UL — SIGNIFICANT CHANGE UP (ref 3.8–5.2)
RBC # FLD: 13.2 % — SIGNIFICANT CHANGE UP (ref 10.3–14.5)
RH IG SCN BLD-IMP: POSITIVE — SIGNIFICANT CHANGE UP
SODIUM SERPL-SCNC: 145 MMOL/L — SIGNIFICANT CHANGE UP (ref 135–145)
WBC # BLD: 6.99 K/UL — SIGNIFICANT CHANGE UP (ref 3.8–10.5)
WBC # FLD AUTO: 6.99 K/UL — SIGNIFICANT CHANGE UP (ref 3.8–10.5)

## 2021-11-11 PROCEDURE — 63046 LAM FACETEC & FORAMOT THRC: CPT | Mod: 59

## 2021-11-11 PROCEDURE — 22610 ARTHRD PST TQ 1NTRSPC THRC: CPT

## 2021-11-11 PROCEDURE — 63055 DECOMPRESS SPINAL CORD THRC: CPT

## 2021-11-11 PROCEDURE — 22840 INSERT SPINE FIXATION DEVICE: CPT

## 2021-11-11 PROCEDURE — 88304 TISSUE EXAM BY PATHOLOGIST: CPT | Mod: 26

## 2021-11-11 RX ORDER — CHLORHEXIDINE GLUCONATE 213 G/1000ML
1 SOLUTION TOPICAL ONCE
Refills: 0 | Status: DISCONTINUED | OUTPATIENT
Start: 2021-11-11 | End: 2021-11-11

## 2021-11-11 RX ORDER — KETOROLAC TROMETHAMINE 30 MG/ML
10 SYRINGE (ML) INJECTION EVERY 6 HOURS
Refills: 0 | Status: DISCONTINUED | OUTPATIENT
Start: 2021-11-11 | End: 2021-11-11

## 2021-11-11 RX ORDER — DIPHENHYDRAMINE HCL 50 MG
12.5 CAPSULE ORAL ONCE
Refills: 0 | Status: DISCONTINUED | OUTPATIENT
Start: 2021-11-11 | End: 2021-11-17

## 2021-11-11 RX ORDER — LISINOPRIL 2.5 MG/1
10 TABLET ORAL DAILY
Refills: 0 | Status: DISCONTINUED | OUTPATIENT
Start: 2021-11-11 | End: 2021-11-17

## 2021-11-11 RX ORDER — OXYCODONE HYDROCHLORIDE 5 MG/1
10 TABLET ORAL EVERY 4 HOURS
Refills: 0 | Status: DISCONTINUED | OUTPATIENT
Start: 2021-11-11 | End: 2021-11-17

## 2021-11-11 RX ORDER — DEXAMETHASONE 0.5 MG/5ML
8 ELIXIR ORAL EVERY 8 HOURS
Refills: 0 | Status: COMPLETED | OUTPATIENT
Start: 2021-11-11 | End: 2021-11-13

## 2021-11-11 RX ORDER — BUDESONIDE AND FORMOTEROL FUMARATE DIHYDRATE 160; 4.5 UG/1; UG/1
2 AEROSOL RESPIRATORY (INHALATION)
Refills: 0 | Status: DISCONTINUED | OUTPATIENT
Start: 2021-11-11 | End: 2021-11-17

## 2021-11-11 RX ORDER — ALBUTEROL 90 UG/1
1 AEROSOL, METERED ORAL EVERY 6 HOURS
Refills: 0 | Status: DISCONTINUED | OUTPATIENT
Start: 2021-11-11 | End: 2021-11-17

## 2021-11-11 RX ORDER — SODIUM CHLORIDE 9 MG/ML
3 INJECTION INTRAMUSCULAR; INTRAVENOUS; SUBCUTANEOUS EVERY 8 HOURS
Refills: 0 | Status: DISCONTINUED | OUTPATIENT
Start: 2021-11-11 | End: 2021-11-11

## 2021-11-11 RX ORDER — OXYCODONE HYDROCHLORIDE 5 MG/1
5 TABLET ORAL EVERY 4 HOURS
Refills: 0 | Status: DISCONTINUED | OUTPATIENT
Start: 2021-11-11 | End: 2021-11-14

## 2021-11-11 RX ORDER — METOPROLOL TARTRATE 50 MG
75 TABLET ORAL DAILY
Refills: 0 | Status: DISCONTINUED | OUTPATIENT
Start: 2021-11-11 | End: 2021-11-12

## 2021-11-11 RX ORDER — HYDROMORPHONE HYDROCHLORIDE 2 MG/ML
0.5 INJECTION INTRAMUSCULAR; INTRAVENOUS; SUBCUTANEOUS
Refills: 0 | Status: DISCONTINUED | OUTPATIENT
Start: 2021-11-11 | End: 2021-11-11

## 2021-11-11 RX ORDER — KETOROLAC TROMETHAMINE 30 MG/ML
10 SYRINGE (ML) INJECTION EVERY 6 HOURS
Refills: 0 | Status: DISCONTINUED | OUTPATIENT
Start: 2021-11-11 | End: 2021-11-14

## 2021-11-11 RX ORDER — ACETAMINOPHEN 500 MG
650 TABLET ORAL EVERY 4 HOURS
Refills: 0 | Status: DISCONTINUED | OUTPATIENT
Start: 2021-11-11 | End: 2021-11-17

## 2021-11-11 RX ORDER — ONDANSETRON 8 MG/1
4 TABLET, FILM COATED ORAL EVERY 6 HOURS
Refills: 0 | Status: DISCONTINUED | OUTPATIENT
Start: 2021-11-11 | End: 2021-11-17

## 2021-11-11 RX ORDER — ONDANSETRON 8 MG/1
4 TABLET, FILM COATED ORAL ONCE
Refills: 0 | Status: DISCONTINUED | OUTPATIENT
Start: 2021-11-11 | End: 2021-11-11

## 2021-11-11 RX ORDER — CEFAZOLIN SODIUM 1 G
2000 VIAL (EA) INJECTION EVERY 8 HOURS
Refills: 0 | Status: COMPLETED | OUTPATIENT
Start: 2021-11-11 | End: 2021-11-12

## 2021-11-11 RX ORDER — METOPROLOL TARTRATE 50 MG
75 TABLET ORAL DAILY
Refills: 0 | Status: DISCONTINUED | OUTPATIENT
Start: 2021-11-11 | End: 2021-11-11

## 2021-11-11 RX ORDER — PANTOPRAZOLE SODIUM 20 MG/1
40 TABLET, DELAYED RELEASE ORAL
Refills: 0 | Status: DISCONTINUED | OUTPATIENT
Start: 2021-11-11 | End: 2021-11-17

## 2021-11-11 RX ORDER — LIDOCAINE HCL 20 MG/ML
0.2 VIAL (ML) INJECTION ONCE
Refills: 0 | Status: DISCONTINUED | OUTPATIENT
Start: 2021-11-11 | End: 2021-11-11

## 2021-11-11 RX ORDER — CYCLOBENZAPRINE HYDROCHLORIDE 10 MG/1
10 TABLET, FILM COATED ORAL EVERY 8 HOURS
Refills: 0 | Status: DISCONTINUED | OUTPATIENT
Start: 2021-11-11 | End: 2021-11-14

## 2021-11-11 RX ORDER — SENNA PLUS 8.6 MG/1
2 TABLET ORAL AT BEDTIME
Refills: 0 | Status: DISCONTINUED | OUTPATIENT
Start: 2021-11-11 | End: 2021-11-17

## 2021-11-11 RX ORDER — OXYCODONE HYDROCHLORIDE 5 MG/1
5 TABLET ORAL EVERY 4 HOURS
Refills: 0 | Status: DISCONTINUED | OUTPATIENT
Start: 2021-11-11 | End: 2021-11-11

## 2021-11-11 RX ADMIN — Medication 100 MILLIGRAM(S): at 22:24

## 2021-11-11 RX ADMIN — HYDROMORPHONE HYDROCHLORIDE 0.5 MILLIGRAM(S): 2 INJECTION INTRAMUSCULAR; INTRAVENOUS; SUBCUTANEOUS at 14:12

## 2021-11-11 RX ADMIN — CYCLOBENZAPRINE HYDROCHLORIDE 10 MILLIGRAM(S): 10 TABLET, FILM COATED ORAL at 14:43

## 2021-11-11 RX ADMIN — HYDROMORPHONE HYDROCHLORIDE 0.5 MILLIGRAM(S): 2 INJECTION INTRAMUSCULAR; INTRAVENOUS; SUBCUTANEOUS at 14:45

## 2021-11-11 RX ADMIN — BUDESONIDE AND FORMOTEROL FUMARATE DIHYDRATE 2 PUFF(S): 160; 4.5 AEROSOL RESPIRATORY (INHALATION) at 20:00

## 2021-11-11 RX ADMIN — Medication 10 MILLIGRAM(S): at 23:07

## 2021-11-11 RX ADMIN — OXYCODONE HYDROCHLORIDE 10 MILLIGRAM(S): 5 TABLET ORAL at 22:30

## 2021-11-11 RX ADMIN — OXYCODONE HYDROCHLORIDE 5 MILLIGRAM(S): 5 TABLET ORAL at 15:17

## 2021-11-11 RX ADMIN — CYCLOBENZAPRINE HYDROCHLORIDE 10 MILLIGRAM(S): 10 TABLET, FILM COATED ORAL at 23:36

## 2021-11-11 RX ADMIN — OXYCODONE HYDROCHLORIDE 5 MILLIGRAM(S): 5 TABLET ORAL at 15:47

## 2021-11-11 RX ADMIN — OXYCODONE HYDROCHLORIDE 10 MILLIGRAM(S): 5 TABLET ORAL at 23:00

## 2021-11-11 RX ADMIN — Medication 10 MILLIGRAM(S): at 22:37

## 2021-11-11 RX ADMIN — Medication 101.6 MILLIGRAM(S): at 14:44

## 2021-11-11 RX ADMIN — Medication 101.6 MILLIGRAM(S): at 22:38

## 2021-11-11 NOTE — CHART NOTE - NSCHARTNOTEFT_GEN_A_CORE
CAPRINI SCORE [CLOT]    AGE RELATED RISK FACTORS                                                       MOBILITY RELATED FACTORS  [ ] Age 41-60 years                                            (1 Point)                  [ ] Bed rest                                                        (1 Point)  [x] Age: 61-74 years                                           (2 Points)                 [ ] Plaster cast                                                   (2 Points)  [ ] Age= 75 years                                              (3 Points)                 [ ] Bed bound for more than 72 hours                 (2 Points)    DISEASE RELATED RISK FACTORS                                               GENDER SPECIFIC FACTORS  [ ] Edema in the lower extremities                       (1 Point)                  [ ] Pregnancy                                                     (1 Point)  [ ] Varicose veins                                               (1 Point)                  [ ] Post-partum < 6 weeks                                   (1 Point)             [x] BMI > 25 Kg/m2                                            (1 Point)                  [ ] Hormonal therapy  or oral contraception          (1 Point)                 [ ] Sepsis (in the previous month)                        (1 Point)                  [ ] History of pregnancy complications                 (1 point)  [ ] Pneumonia or serious lung disease                                               [ ] Unexplained or recurrent                     (1 Point)           (in the previous month)                               (1 Point)  [ ] Abnormal pulmonary function test                     (1 Point)                 SURGERY RELATED RISK FACTORS  [ ] Acute myocardial infarction                              (1 Point)                 [ ]  Section                                             (1 Point)  [x] Congestive heart failure (in the previous month)  (1 Point)               [ ] Minor surgery                                                  (1 Point)   [ ] Inflammatory bowel disease                             (1 Point)                 [ ] Arthroscopic surgery                                        (2 Points)  [ ] Central venous access                                      (2 Points)                [x] General surgery lasting more than 45 minutes   (2 Points)       [ ] Stroke (in the previous month)                          (5 Points)               [ ] Elective arthroplasty                                         (5 Points)                                                                                                                                               HEMATOLOGY RELATED FACTORS                                                 TRAUMA RELATED RISK FACTORS  [ ] Prior episodes of VTE                                     (3 Points)                [ ] Fracture of the hip, pelvis, or leg                       (5 Points)  [ ] Positive family history for VTE                         (3 Points)                 [ ] Acute spinal cord injury (in the previous month)  (5 Points)  [ ] Prothrombin 97476 A                                     (3 Points)                 [ ] Paralysis  (less than 1 month)                             (5 Points)  [ ] Factor V Leiden                                             (3 Points)                  [ ] Multiple Trauma within 1 month                        (5 Points)  [ ] Lupus anticoagulants                                     (3 Points)                                                           [ ] Anticardiolipin antibodies                               (3 Points)                                                       [ ] High homocysteine in the blood                      (3 Points)                                             [ ] Other congenital or acquired thrombophilia      (3 Points)                                                [ ] Heparin induced thrombocytopenia                  (3 Points)                                          Total Score [    6      ]  Will order LE dopplers due to patient's immobility pre-operatively.    Caprini Score 0 - 2:  Low Risk, No VTE Prophylaxis required for most patients, encourage ambulation  Caprini Score 3 - 6:  At Risk, pharmacologic VTE prophylaxis is indicated for most patients (in the absence of a contraindication)  Caprini Score Greater than or = 7:  High Risk, pharmacologic VTE prophylaxis is indicated for most patients (in the absence of a contraindication)

## 2021-11-11 NOTE — PHYSICAL THERAPY INITIAL EVALUATION ADULT - PERTINENT HX OF CURRENT PROBLEM, REHAB EVAL
61 y/o F with h/o HTN, HLD, chronic CHF, CAD s/p MI, TIA s/p loop recorder placement, left BBB, asthma, with c/o b/l leg pain associated with paresthesia and difficulty walking. pt now presents s/p T10-T12 decompression and T11-T11 posterior fusion.

## 2021-11-11 NOTE — CHART NOTE - NSCHARTNOTEFT_GEN_A_CORE
Patient seen in PACU resting without complaints.  No Chest Pain, SOB, N/V.  Pre op s/sx : BLE radiculopathy and pelvic pain ; Post op, patient reports: incisional pain .    T(C): 36.9 (11-11-21 @ 20:45), Max: 36.9 (11-11-21 @ 19:45)  HR: 60 (11-11-21 @ 20:45) (58 - 106)  BP: 120/61 (11-11-21 @ 20:45) (94/52 - 127/42)  RR: 16 (11-11-21 @ 20:45) (14 - 19)  SpO2: 97% (11-11-21 @ 20:45) (96% - 100%)    Exam:   Alert/Oriented, No Acute Distress           Dressing: [x] clean/dry/intact  [ ] Other:           Drains: : HV in place maintaining good suction         Sensation: [x] intact to light touch  [ ] decreased:          Motor exam: [  ]                     [ ] Lower extremity                     PF          DF        EHL      FHL                                                                                            R        5/5        5/5       5/5       5/5                                                        L        4/5        5/5        5/5       4/5                                                                  Calves Soft/Non-tender bilaterally           [ ] warm well perfused; capillary refill <3 seconds              LABS:                        10.8   6.99  )-----------( 214      ( 11 Nov 2021 14:34 )             34.5     11-11    145  |  111<H>  |  10  ----------------------------<  154<H>  3.9   |  21<L>  |  0.77    Ca    8.2<L>      11 Nov 2021 14:34          A/P :  60y Female s/p T10-12 decompression/T10-11 fusion    -    Pain control  -    Decadron  -    DVT ppx: SCDs       -    Physical Therapy: [facility]  -    Followup AM labs    Cele Serna PA-C  Pager # [phone] Patient seen in PACU resting without complaints.  No Chest Pain, SOB, N/V.  Pre op s/sx : BLE radiculopathy and pelvic pain ; Post op, patient reports: incisional pain .    T(C): 36.9 (11-11-21 @ 20:45), Max: 36.9 (11-11-21 @ 19:45)  HR: 60 (11-11-21 @ 20:45) (58 - 106)  BP: 120/61 (11-11-21 @ 20:45) (94/52 - 127/42)  RR: 16 (11-11-21 @ 20:45) (14 - 19)  SpO2: 97% (11-11-21 @ 20:45) (96% - 100%)    Exam:   Alert/Oriented, No Acute Distress           Dressing: [x] clean/dry/intact  [ ] Other:           Drains: : HV in place maintaining good suction         Sensation: [x] intact to light touch  [ ] decreased:          Motor exam: [  ]                     [ ] Lower extremity                     PF          DF        EHL      FHL                                                                                            R        5/5        5/5       5/5       5/5                                                        L        4/5        5/5        5/5       4/5                                                                  Calves Soft/Non-tender bilaterally         [x] warm well perfused; capillary refill <3 seconds              LABS:                        10.8   6.99  )-----------( 214      ( 11 Nov 2021 14:34 )             34.5     11-11    145  |  111<H>  |  10  ----------------------------<  154<H>  3.9   |  21<L>  |  0.77    Ca    8.2<L>      11 Nov 2021 14:34          A/P :  60y Female s/p T10-12 decompression/T10-11 fusion    -    Pain control  -    Decadron  -    DVT ppx: SCDs       -    Physical Therapy: [facility]  -    Followup AM labs    Cele Serna PA-C  Pager # [phone]

## 2021-11-11 NOTE — PRE-ANESTHESIA EVALUATION ADULT - NSANTHPMHFT_GEN_ALL_CORE
59 y/o female with PMH of HTN, hyperlipidemia chronic CHF, CAD s/p MI 2010- no intervention, TIA (no residual deficit) s/p loop recorder placement 2018- Paroxysmal atrial tachycarida, left BBB, asthma,  c/o both leg pain associated with paresthesia and difficulty walking,    METS limited by leg weakness/pain. denies CP/SOB

## 2021-11-11 NOTE — PRE-ANESTHESIA EVALUATION ADULT - LAST ECHOCARDIOGRAM
11/5/21EF 50%, normal RV function, mild MR. trivial AI. small to mod pericardial effusion mainly around RV. no echo evidence of chamber collapse.

## 2021-11-11 NOTE — BRIEF OPERATIVE NOTE - NSICDXBRIEFPOSTOP_GEN_ALL_CORE_FT
POST-OP DIAGNOSIS:  Myelopathy concurrent with and due to spinal stenosis of thoracic region 11-Nov-2021 13:44:11  Devin Bhat

## 2021-11-11 NOTE — PHYSICAL THERAPY INITIAL EVALUATION ADULT - MANUAL MUSCLE TESTING RESULTS, REHAB EVAL
BUE and RLE grossly at least 3+/5, L hip flexion 3-/5, L knee extension 3/5, L ankle dorsiflexion 3-/5/grossly assessed due to

## 2021-11-11 NOTE — PHYSICAL THERAPY INITIAL EVALUATION ADULT - GENERAL OBSERVATIONS, REHAB EVAL
pt dagmar 30 min eval fair. pt rec'd in bed, hemovac, amado, NAD, agreed to session. PT reviewed spinal precautions. pt p/w LLE weakness. orthostatics assessed (see vitals sheet). see initial evaluation document for functional assessment. further activity deferred 2/2 pt strength/balance deficits and pt discomfort. Pt left in chair, RN Bharathi aware, NAD, all lines intact, cb in reach, all needs met/5Ps.

## 2021-11-11 NOTE — PHYSICAL THERAPY INITIAL EVALUATION ADULT - ADDITIONAL COMMENTS
pt states she lives with son in a private home with 4 steps to enter (+handrail), first floor set up inside. Pt states prior to admission being independent with all functional mobility and ADLs. pt states she owns a straight cane. pt states she was participating in outpatient PT prior to admission. pt reports she buckled at her last PT appointment.

## 2021-11-11 NOTE — PHYSICAL THERAPY INITIAL EVALUATION ADULT - PLANNED THERAPY INTERVENTIONS, PT EVAL
GOAL: Pt will negotiate 4 steps with unilateral handrail in a step-to pattern independently within 2 weeks/balance training/bed mobility training/gait training/strengthening/transfer training

## 2021-11-11 NOTE — PRE-ANESTHESIA EVALUATION ADULT - NSANTHPEFT_GEN_ALL_CORE
Spoke with Juliet in Same Day Surgery and informed her that the patient has not returned any calls from Pre-Admission and therefore the pre-procedural phone interview and instruction has not been completed.   GENERAL: NAD  HEAD:  Atraumatic, Normocephalic  CHEST/LUNG: Clear to auscultation bilaterally  HEART: Normal S1/S2  PSYCH: AAOx3  NEUROLOGY: non-focal  SKIN: No obvious rashes or lesions

## 2021-11-11 NOTE — BRIEF OPERATIVE NOTE - NSICDXBRIEFPREOP_GEN_ALL_CORE_FT
PRE-OP DIAGNOSIS:  Myelopathy concurrent with and due to spinal stenosis of thoracic region 11-Nov-2021 13:44:07  Devin Bhat

## 2021-11-11 NOTE — BRIEF OPERATIVE NOTE - NSICDXBRIEFPROCEDURE_GEN_ALL_CORE_FT
PROCEDURES:  Fusion, spine, thoracic, posterior approach 11-Nov-2021 13:43:07 thoracic myelopathy Devin Bhat

## 2021-11-11 NOTE — PHYSICAL THERAPY INITIAL EVALUATION ADULT - GAIT DEVIATIONS NOTED, PT EVAL
difficulty advancing LLE, decreased toe to floor clearance, decreased L hip and knee flexion AROM on swing phase/decreased step length

## 2021-11-12 LAB
ANION GAP SERPL CALC-SCNC: 12 MMOL/L — SIGNIFICANT CHANGE UP (ref 5–17)
ANION GAP SERPL CALC-SCNC: 6 MMOL/L — SIGNIFICANT CHANGE UP (ref 5–17)
BASOPHILS # BLD AUTO: 0 K/UL — SIGNIFICANT CHANGE UP (ref 0–0.2)
BASOPHILS NFR BLD AUTO: 0 % — SIGNIFICANT CHANGE UP (ref 0–2)
BUN SERPL-MCNC: 6 MG/DL — LOW (ref 7–23)
BUN SERPL-MCNC: 9 MG/DL — SIGNIFICANT CHANGE UP (ref 7–23)
CALCIUM SERPL-MCNC: 4.5 MG/DL — CRITICAL LOW (ref 8.4–10.5)
CALCIUM SERPL-MCNC: 8.7 MG/DL — SIGNIFICANT CHANGE UP (ref 8.4–10.5)
CHLORIDE SERPL-SCNC: 108 MMOL/L — SIGNIFICANT CHANGE UP (ref 96–108)
CHLORIDE SERPL-SCNC: 124 MMOL/L — HIGH (ref 96–108)
CO2 SERPL-SCNC: 14 MMOL/L — LOW (ref 22–31)
CO2 SERPL-SCNC: 22 MMOL/L — SIGNIFICANT CHANGE UP (ref 22–31)
COVID-19 NUCLEOCAPSID GAM AB INTERP: POSITIVE
COVID-19 NUCLEOCAPSID TOTAL GAM ANTIBODY RESULT: 13.7 INDEX — HIGH
COVID-19 SPIKE DOMAIN AB INTERP: POSITIVE
COVID-19 SPIKE DOMAIN ANTIBODY RESULT: >250 U/ML — HIGH
CREAT SERPL-MCNC: 0.45 MG/DL — LOW (ref 0.5–1.3)
CREAT SERPL-MCNC: 0.79 MG/DL — SIGNIFICANT CHANGE UP (ref 0.5–1.3)
EOSINOPHIL # BLD AUTO: 0 K/UL — SIGNIFICANT CHANGE UP (ref 0–0.5)
EOSINOPHIL NFR BLD AUTO: 0 % — SIGNIFICANT CHANGE UP (ref 0–6)
GLUCOSE SERPL-MCNC: 125 MG/DL — HIGH (ref 70–99)
GLUCOSE SERPL-MCNC: 84 MG/DL — SIGNIFICANT CHANGE UP (ref 70–99)
HCT VFR BLD CALC: 18.6 % — CRITICAL LOW (ref 34.5–45)
HCT VFR BLD CALC: 33.4 % — LOW (ref 34.5–45)
HGB BLD-MCNC: 10.4 G/DL — LOW (ref 11.5–15.5)
HGB BLD-MCNC: 5.7 G/DL — CRITICAL LOW (ref 11.5–15.5)
IMM GRANULOCYTES NFR BLD AUTO: 0.5 % — SIGNIFICANT CHANGE UP (ref 0–1.5)
LYMPHOCYTES # BLD AUTO: 1.01 K/UL — SIGNIFICANT CHANGE UP (ref 1–3.3)
LYMPHOCYTES # BLD AUTO: 16.3 % — SIGNIFICANT CHANGE UP (ref 13–44)
MCHC RBC-ENTMCNC: 28.7 PG — SIGNIFICANT CHANGE UP (ref 27–34)
MCHC RBC-ENTMCNC: 28.8 PG — SIGNIFICANT CHANGE UP (ref 27–34)
MCHC RBC-ENTMCNC: 30.6 GM/DL — LOW (ref 32–36)
MCHC RBC-ENTMCNC: 31.1 GM/DL — LOW (ref 32–36)
MCV RBC AUTO: 92.3 FL — SIGNIFICANT CHANGE UP (ref 80–100)
MCV RBC AUTO: 93.9 FL — SIGNIFICANT CHANGE UP (ref 80–100)
MONOCYTES # BLD AUTO: 0.51 K/UL — SIGNIFICANT CHANGE UP (ref 0–0.9)
MONOCYTES NFR BLD AUTO: 8.2 % — SIGNIFICANT CHANGE UP (ref 2–14)
NEUTROPHILS # BLD AUTO: 4.65 K/UL — SIGNIFICANT CHANGE UP (ref 1.8–7.4)
NEUTROPHILS NFR BLD AUTO: 75 % — SIGNIFICANT CHANGE UP (ref 43–77)
NRBC # BLD: 0 /100 WBCS — SIGNIFICANT CHANGE UP (ref 0–0)
NRBC # BLD: 0 /100 WBCS — SIGNIFICANT CHANGE UP (ref 0–0)
PLATELET # BLD AUTO: 112 K/UL — LOW (ref 150–400)
PLATELET # BLD AUTO: 225 K/UL — SIGNIFICANT CHANGE UP (ref 150–400)
POTASSIUM SERPL-MCNC: 2.6 MMOL/L — CRITICAL LOW (ref 3.5–5.3)
POTASSIUM SERPL-MCNC: 4 MMOL/L — SIGNIFICANT CHANGE UP (ref 3.5–5.3)
POTASSIUM SERPL-SCNC: 2.6 MMOL/L — CRITICAL LOW (ref 3.5–5.3)
POTASSIUM SERPL-SCNC: 4 MMOL/L — SIGNIFICANT CHANGE UP (ref 3.5–5.3)
RBC # BLD: 1.98 M/UL — LOW (ref 3.8–5.2)
RBC # BLD: 3.62 M/UL — LOW (ref 3.8–5.2)
RBC # FLD: 13.2 % — SIGNIFICANT CHANGE UP (ref 10.3–14.5)
RBC # FLD: 13.4 % — SIGNIFICANT CHANGE UP (ref 10.3–14.5)
SARS-COV-2 IGG+IGM SERPL QL IA: 13.7 INDEX — HIGH
SARS-COV-2 IGG+IGM SERPL QL IA: >250 U/ML — HIGH
SARS-COV-2 IGG+IGM SERPL QL IA: POSITIVE
SARS-COV-2 IGG+IGM SERPL QL IA: POSITIVE
SODIUM SERPL-SCNC: 142 MMOL/L — SIGNIFICANT CHANGE UP (ref 135–145)
SODIUM SERPL-SCNC: 144 MMOL/L — SIGNIFICANT CHANGE UP (ref 135–145)
WBC # BLD: 10.99 K/UL — HIGH (ref 3.8–10.5)
WBC # BLD: 6.2 K/UL — SIGNIFICANT CHANGE UP (ref 3.8–10.5)
WBC # FLD AUTO: 10.99 K/UL — HIGH (ref 3.8–10.5)
WBC # FLD AUTO: 6.2 K/UL — SIGNIFICANT CHANGE UP (ref 3.8–10.5)

## 2021-11-12 PROCEDURE — 93970 EXTREMITY STUDY: CPT | Mod: 26

## 2021-11-12 RX ORDER — POTASSIUM CHLORIDE 20 MEQ
40 PACKET (EA) ORAL EVERY 4 HOURS
Refills: 0 | Status: COMPLETED | OUTPATIENT
Start: 2021-11-12 | End: 2021-11-12

## 2021-11-12 RX ORDER — SODIUM CHLORIDE 9 MG/ML
1000 INJECTION INTRAMUSCULAR; INTRAVENOUS; SUBCUTANEOUS ONCE
Refills: 0 | Status: COMPLETED | OUTPATIENT
Start: 2021-11-12 | End: 2021-11-12

## 2021-11-12 RX ORDER — METOPROLOL TARTRATE 50 MG
75 TABLET ORAL DAILY
Refills: 0 | Status: DISCONTINUED | OUTPATIENT
Start: 2021-11-12 | End: 2021-11-17

## 2021-11-12 RX ADMIN — CYCLOBENZAPRINE HYDROCHLORIDE 10 MILLIGRAM(S): 10 TABLET, FILM COATED ORAL at 15:52

## 2021-11-12 RX ADMIN — BUDESONIDE AND FORMOTEROL FUMARATE DIHYDRATE 2 PUFF(S): 160; 4.5 AEROSOL RESPIRATORY (INHALATION) at 06:15

## 2021-11-12 RX ADMIN — Medication 40 MILLIEQUIVALENT(S): at 15:52

## 2021-11-12 RX ADMIN — PANTOPRAZOLE SODIUM 40 MILLIGRAM(S): 20 TABLET, DELAYED RELEASE ORAL at 06:17

## 2021-11-12 RX ADMIN — Medication 100 MILLIGRAM(S): at 04:44

## 2021-11-12 RX ADMIN — OXYCODONE HYDROCHLORIDE 10 MILLIGRAM(S): 5 TABLET ORAL at 11:18

## 2021-11-12 RX ADMIN — BUDESONIDE AND FORMOTEROL FUMARATE DIHYDRATE 2 PUFF(S): 160; 4.5 AEROSOL RESPIRATORY (INHALATION) at 18:28

## 2021-11-12 RX ADMIN — SENNA PLUS 2 TABLET(S): 8.6 TABLET ORAL at 21:08

## 2021-11-12 RX ADMIN — CYCLOBENZAPRINE HYDROCHLORIDE 10 MILLIGRAM(S): 10 TABLET, FILM COATED ORAL at 06:15

## 2021-11-12 RX ADMIN — CYCLOBENZAPRINE HYDROCHLORIDE 10 MILLIGRAM(S): 10 TABLET, FILM COATED ORAL at 21:08

## 2021-11-12 RX ADMIN — Medication 40 MILLIEQUIVALENT(S): at 09:25

## 2021-11-12 RX ADMIN — Medication 10 MILLIGRAM(S): at 05:16

## 2021-11-12 RX ADMIN — SODIUM CHLORIDE 1000 MILLILITER(S): 9 INJECTION INTRAMUSCULAR; INTRAVENOUS; SUBCUTANEOUS at 06:11

## 2021-11-12 RX ADMIN — OXYCODONE HYDROCHLORIDE 10 MILLIGRAM(S): 5 TABLET ORAL at 10:48

## 2021-11-12 RX ADMIN — OXYCODONE HYDROCHLORIDE 10 MILLIGRAM(S): 5 TABLET ORAL at 17:53

## 2021-11-12 RX ADMIN — Medication 101.6 MILLIGRAM(S): at 22:37

## 2021-11-12 RX ADMIN — Medication 10 MILLIGRAM(S): at 04:46

## 2021-11-12 RX ADMIN — Medication 10 MILLIGRAM(S): at 23:31

## 2021-11-12 RX ADMIN — Medication 101.6 MILLIGRAM(S): at 15:51

## 2021-11-12 RX ADMIN — Medication 10 MILLIGRAM(S): at 18:28

## 2021-11-12 RX ADMIN — Medication 101.6 MILLIGRAM(S): at 06:11

## 2021-11-12 RX ADMIN — OXYCODONE HYDROCHLORIDE 10 MILLIGRAM(S): 5 TABLET ORAL at 17:23

## 2021-11-12 RX ADMIN — LISINOPRIL 10 MILLIGRAM(S): 2.5 TABLET ORAL at 09:25

## 2021-11-12 RX ADMIN — Medication 10 MILLIGRAM(S): at 19:00

## 2021-11-12 NOTE — OCCUPATIONAL THERAPY INITIAL EVALUATION ADULT - ADDITIONAL COMMENTS
pt states she lives with son in a private home/ first floor apt.  with 4 steps to enter (+handrail), first floor set up inside. +tub, +grab bars throughout Pt states prior to admission being independent with all functional mobility and ADLs. pt states she owns a straight cane from previous stroke.  pt states she was participating in outpatient PT prior to admission. pt reports she buckled at her last PT appointment

## 2021-11-12 NOTE — PROGRESS NOTE ADULT - SUBJECTIVE AND OBJECTIVE BOX
Patient is a 60y old  Female who presents with a chief complaint of " I have leg pain with numbness and tingling and difficulty walking, I need surgery done to be pain free and walk better" (28 Oct 2021 11:51)  Patient s/p T10-12 Decompression, T10-11 Fusion POD#1  Patient comfortable  No complaints    T(C): 36.8 (11-12-21 @ 04:32), Max: 37 (11-11-21 @ 21:46)  HR: 66 (11-12-21 @ 04:32) (58 - 106)  BP: 94/44 (11-12-21 @ 04:32) (94/44 - 127/42)  RR: 16 (11-12-21 @ 04:32) (14 - 19)  SpO2: 98% (11-12-21 @ 04:32) (96% - 100%)  Wt(kg): --    PHYSICAL EXAM:  NAD, Alert  Back: Dressing C/D/I; /170cc,  sensation grossly intact to light touch; (+) Distal Pulses; No Calf tenderness B/L, PAS              [ ] Lower extremeity                  PF          DF         EHL       FHL                                                                                            R        5/5        5/5        5/5       5/5                                                        L         4/5        4/5        4/5       4/5   LABS:                   10.8   6.99  )-----------( 214      ( 11 Nov 2021 14:34 )             34.5   11-11  145  |  111<H>  |  10  ----------------------------<  154<H>  3.9   |  21<L>  |  0.77  Ca    8.2<L>      11 Nov 2021 14:34       Patient is a 60y old  Female who presents with a chief complaint of " I have leg pain with numbness and tingling and difficulty walking, I need surgery done to be pain free and walk better" (28 Oct 2021 11:51)  Patient s/p T10-12 Decompression, T10-11 Fusion POD#1  Patient comfortable  No complaints    T(C): 36.8 (11-12-21 @ 04:32), Max: 37 (11-11-21 @ 21:46)  HR: 66 (11-12-21 @ 04:32) (58 - 106)  BP: 94/44 (11-12-21 @ 04:32) (94/44 - 127/42)  RR: 16 (11-12-21 @ 04:32) (14 - 19)  SpO2: 98% (11-12-21 @ 04:32) (96% - 100%)  Wt(kg): --    PHYSICAL EXAM:  NAD, Alert  Back: Dressing C/D/I; /170cc,  sensation grossly intact to light touch; (+) Distal Pulses; No Calf tenderness B/L, PAS              [ ] Lower extremeity                  PF          DF         EHL       FHL                                                                                            R        5/5        5/5        5/5       5/5                                                        L         5/5        5/5        5/5       5/5   LABS:                   10.8   6.99  )-----------( 214      ( 11 Nov 2021 14:34 )             34.5   11-11  145  |  111<H>  |  10  ----------------------------<  154<H>  3.9   |  21<L>  |  0.77  Ca    8.2<L>      11 Nov 2021 14:34

## 2021-11-12 NOTE — H&P PST ADULT - VENOUS THROMBOEMBOLISM CURRENT STATUS
----- Message from Candice Lara MD sent at 11/12/2021  8:23 AM EST -----  Regarding: lead recollect  We saw this kiddo for a [facility] but the lead was unable to be run. They have a nurse visit scheduled for 11/30 for 2nd flu. Can you add a 'lead recollect' to the reason for visit on that nursing visit so they are aware it needs to be redone? Thanks!
Good Morning ,  I trid calling patient's mother, but she did not answer. I left a voicemail stating that we will have to repeat the lead at the nurse visit appointment on 11/30/2021. I stated that if she had any further questions to give our office a callback. I also put on a note on the nurse visit appointment to repeat lead so the nurse would be aware.    Thank you
Lead test unable to be run. Will recollect at visit for 2nd flu on 11/30. Sending message to nursing to inform them of this.      Henrique Garcia MD
(1) swollen legs (current)/(1) other risk factor (includes escalating BMI, pack-years of smoking, diabetes requiring insulin, chemotherapy, female gender and length of surgery)

## 2021-11-12 NOTE — CONSULT NOTE ADULT - SUBJECTIVE AND OBJECTIVE BOX
Patient is a 60y old  Female who presents with a chief complaint of Back pain, spondylosis (2021 06:16)    Admission HPI:  This is a 59 y/o female with PMH of HTN, hyperlipidemia chronic CHF, CAD s/p MI , TIA s/p loop recorder placement , left BBB, asthma,  c/o both leg pain associated with paresthesia and difficulty walking, she presents today for  T10-T12 decompression/fusion  covid swab to be done   at [facility], denies fever, cough, SOB, change in taste/smell  pt will see PCP and cardiologist for evaluation prior to surgery , stop ASA 7 days prior to surgery (28 Oct 2021 11:51)    Interval History:  Patient went to OR on  for T10-12 decompression and fusion.  Post-op relatively uncomplicated.    REVIEW OF SYSTEMS: No chest pain, shortness of breath, nausea, vomiting or diarhea; other ROS neg     PAST MEDICAL & SURGICAL HISTORY  Asthma    LBBB (left bundle branch block)    CHF (congestive heart failure)    Myocardial infarction, unspecified MI type, unspecified artery    Idiopathic neuropathy    Hypertension    Hyperlipidemia    Migraines    TIA (transient ischemic attack)  History of tubal ligation    History of     S/P lumpectomy, left breast    History of cataract surgery    FUNCTIONAL HISTORY:   Lives w son in home w 4 KINGSLEY  PTA Independent    CURRENT FUNCTIONAL STATUS:  Min A transfers and gait    FAMILY HISTORY   No pertinent family history in first degree relatives    Family history of diabetes mellitus (Father)    Family history of hypertension (Mother)      MEDICATIONS   acetaminophen     Tablet .. 650 milliGRAM(s) Oral every 4 hours PRN  ALBUTerol    90 MICROgram(s) HFA Inhaler 1 Puff(s) Inhalation every 6 hours PRN  budesonide  80 MICROgram(s)/formoterol 4.5 MICROgram(s) Inhaler 2 Puff(s) Inhalation two times a day  ceFAZolin   IVPB 2000 milliGRAM(s) IV Intermittent once  cyclobenzaprine 10 milliGRAM(s) Oral every 8 hours  dexAMETHasone  IVPB 8 milliGRAM(s) IV Intermittent every 8 hours  diphenhydrAMINE Injectable 12.5 milliGRAM(s) IV Push once PRN  ketorolac 10 milliGRAM(s) Oral every 6 hours  lisinopril 10 milliGRAM(s) Oral daily  metoprolol succinate ER 75 milliGRAM(s) Oral daily  ondansetron Injectable 4 milliGRAM(s) IV Push every 6 hours PRN  oxyCODONE    IR 5 milliGRAM(s) Oral every 4 hours PRN  oxyCODONE    IR 10 milliGRAM(s) Oral every 4 hours PRN  pantoprazole    Tablet 40 milliGRAM(s) Oral before breakfast  potassium chloride    Tablet ER 40 milliEquivalent(s) Oral every 4 hours  senna 2 Tablet(s) Oral at bedtime      ALLERGIES  No Known Drug Allergies  shellfish (Anaphylaxis)      VITALS  T(C): 36.9 (21 @ 12:50), Max: 37 (21 @ 21:46)  HR: 64 (21 @ 12:50) (58 - 94)  BP: 108/57 (21 @ 12:50) (94/44 - 127/42)  RR: 16 (21 @ 12:50) (14 - 19)  SpO2: 97% (21 @ 12:50) (96% - 100%)  Wt(kg): --    PHYSICAL EXAM  Constitutional - NAD, Comfortable  HEENT - NCAT, EOMI  Neck - Supple, No limited ROM  Chest - CTA bilaterally, No wheeze, No rhonchi, No crackles  Cardiovascular - RRR, S1S2, No murmurs  Abdomen - BS+, Soft, NTND  Extremities - No C/C/E, No calf tenderness   Neurologic Exam -                    Cognitive - Awake, Alert, AAO to self, place, date, year, situation     Communication - Fluent, No dysarthria, no aphasia     Cranial Nerves - CN 2-12 intact     Motor - No focal deficits      Sensory - Intact to LT     Reflexes - DTR Intact, No primitive reflexive  Psychiatric - Mood stable, Affect WNL    RECENT LABS/IMAGING  CBC Full  -  ( 2021 10:13 )  WBC Count : 10.99 K/uL  RBC Count : 3.62 M/uL  Hemoglobin : 10.4 g/dL  Hematocrit : 33.4 %  Platelet Count - Automated : 225 K/uL  Mean Cell Volume : 92.3 fl  Mean Cell Hemoglobin : 28.7 pg  Mean Cell Hemoglobin Concentration : 31.1 gm/dL  Auto Neutrophil # : x  Auto Lymphocyte # : x  Auto Monocyte # : x  Auto Eosinophil # : x  Auto Basophil # : x  Auto Neutrophil % : x  Auto Lymphocyte % : x  Auto Monocyte % : x  Auto Eosinophil % : x  Auto Basophil % : x    11-12    142  |  108  |  9   ----------------------------<  125<H>  4.0   |  22  |  0.79    Ca    8.7      2021 10:13    Impression:  59 yo with functional deficits secondary to diagnosis of spinal stenosis    Plan:  PT- ROM, Bed Mob, Transfers, Amb w AD and bracing as needed  OT- ADLs, bracing  Prec- Falls, Cardiac  Pain- flexeril, oxycodone prn  DVT Prophylaxis- SCDs  Skin- Turn q2 h  Dispo-  Patient is a 60y old  Female who presents with a chief complaint of Back pain, spondylosis (2021 06:16)    Admission HPI:  This is a 59 y/o female with PMH of HTN, hyperlipidemia chronic CHF, CAD s/p MI , TIA s/p loop recorder placement , left BBB, asthma,  c/o both leg pain associated with paresthesia and difficulty walking, she presents today for  T10-T12 decompression/fusion  covid swab to be done   at [facility], denies fever, cough, SOB, change in taste/smell  pt will see PCP and cardiologist for evaluation prior to surgery , stop ASA 7 days prior to surgery (28 Oct 2021 11:51)    Interval History:  Patient went to OR on  for T10-12 decompression and fusion.  Post-op relatively uncomplicated.    REVIEW OF SYSTEMS: No chest pain, shortness of breath, nausea, vomiting or diarhea; other ROS neg     PAST MEDICAL & SURGICAL HISTORY  Asthma    LBBB (left bundle branch block)    CHF (congestive heart failure)    Myocardial infarction, unspecified MI type, unspecified artery    Idiopathic neuropathy    Hypertension    Hyperlipidemia    Migraines    TIA (transient ischemic attack)  History of tubal ligation    History of     S/P lumpectomy, left breast    History of cataract surgery    FUNCTIONAL HISTORY:   Lives w son in home w 4 KINGSLEY  PTA Independent    CURRENT FUNCTIONAL STATUS:  Min A transfers and gait    FAMILY HISTORY   No pertinent family history in first degree relatives    Family history of diabetes mellitus (Father)    Family history of hypertension (Mother)      MEDICATIONS   MEDICATIONS  (STANDING):  bisacodyl 5 milliGRAM(s) Oral at bedtime  budesonide  80 MICROgram(s)/formoterol 4.5 MICROgram(s) Inhaler 2 Puff(s) Inhalation two times a day  ceFAZolin   IVPB 2000 milliGRAM(s) IV Intermittent once  lisinopril 10 milliGRAM(s) Oral daily  metoprolol succinate ER 75 milliGRAM(s) Oral daily  pantoprazole    Tablet 40 milliGRAM(s) Oral before breakfast  senna 2 Tablet(s) Oral at bedtime    MEDICATIONS  (PRN):  acetaminophen     Tablet .. 650 milliGRAM(s) Oral every 4 hours PRN Temp greater or equal to 38C (100.4F), Mild Pain (1 - 3)  ALBUTerol    90 MICROgram(s) HFA Inhaler 1 Puff(s) Inhalation every 6 hours PRN Shortness of Breath and/or Wheezing  cyclobenzaprine 10 milliGRAM(s) Oral three times a day PRN Muscle Spasm  diphenhydrAMINE Injectable 12.5 milliGRAM(s) IV Push once PRN nausea/vomiting  ketorolac 10 milliGRAM(s) Oral every 6 hours PRN Moderate Pain (4 - 6)  ondansetron Injectable 4 milliGRAM(s) IV Push every 6 hours PRN Nausea  oxyCODONE    IR 10 milliGRAM(s) Oral every 4 hours PRN Severe Pain (7 - 10)    Vital Signs Last 24 Hrs  T(C): 36.4 (15 Nov 2021 12:03), Max: 36.9 (2021 16:15)  T(F): 97.6 (15 Nov 2021 12:03), Max: 98.4 (2021 16:15)  HR: 84 (15 Nov 2021 12:) (64 - 84)  BP: 92/54 (15 Nov 2021 12:) (92/54 - 107/66)  BP(mean): --  RR: 16 (15 Nov 2021 12:03) (16 - 18)  SpO2: 96% (15 Nov 2021 12:03) (96% - 98%)    PHYSICAL EXAM  Constitutional - NAD, Comfortable  HEENT - NCAT, EOMI  Neck - Supple, No limited ROM  Chest - CTA bilaterally, No wheeze, No rhonchi, No crackles  Cardiovascular - RRR, S1S2, No murmurs  Abdomen - BS+, Soft, NTND  Extremities - No C/C/E, No calf tenderness   Neurologic Exam -                    Cognitive - Awake, Alert, AAO to self, place, date, year, situation     Communication - Fluent, No dysarthria, no aphasia     Cranial Nerves - CN 2-12 intact     Motor - No focal deficits      Sensory - Intact to LT     Reflexes - DTR Intact, No primitive reflexive  Psychiatric - Mood stable, Affect WNL                          9.9    11.12 )-----------( 209      ( 2021 08:18 )             32.8     11-14    139  |  106  |  25<H>  ----------------------------<  95  4.8   |  23  |  1.15    Ca    8.9      2021 08:18    Impression:  61 yo with functional deficits secondary to diagnosis of spinal stenosis    Plan:  PT- ROM, Bed Mob, Transfers, Amb w AD and bracing as needed  OT- ADLs, bracing  Prec- Falls, Cardiac  Pain- flexeril, oxycodone prn  DVT Prophylaxis- SCDs  Skin- Turn q2 h  Dispo-  Patient is a 60y old  Female who presents with a chief complaint of Back pain, spondylosis (2021 06:16)    Admission HPI:  This is a 59 y/o female with PMH of HTN, hyperlipidemia chronic CHF, CAD s/p MI , TIA s/p loop recorder placement , left BBB, asthma,  c/o both leg pain associated with paresthesia and difficulty walking, she presents today for  T10-T12 decompression/fusion  covid swab to be done   at [facility], denies fever, cough, SOB, change in taste/smell  pt will see PCP and cardiologist for evaluation prior to surgery , stop ASA 7 days prior to surgery (28 Oct 2021 11:51)    Interval History:  Patient went to OR on  for T10-12 decompression and fusion.  Post-op relatively uncomplicated.    REVIEW OF SYSTEMS: + amado, + constipation, + poor balance, No chest pain, shortness of breath, nausea, vomiting or diarhea; other ROS neg     PAST MEDICAL & SURGICAL HISTORY  Asthma    LBBB (left bundle branch block)    CHF (congestive heart failure)    Myocardial infarction, unspecified MI type, unspecified artery    Idiopathic neuropathy    Hypertension    Hyperlipidemia    Migraines    TIA (transient ischemic attack)  History of tubal ligation    History of     S/P lumpectomy, left breast    History of cataract surgery    FUNCTIONAL HISTORY:   Lives w son in home w 4 KINGSLEY  PTA Independent    CURRENT FUNCTIONAL STATUS:  Min A transfers and gait    FAMILY HISTORY   No pertinent family history in first degree relatives    Family history of diabetes mellitus (Father)    Family history of hypertension (Mother)      MEDICATIONS   MEDICATIONS  (STANDING):  bisacodyl 5 milliGRAM(s) Oral at bedtime  budesonide  80 MICROgram(s)/formoterol 4.5 MICROgram(s) Inhaler 2 Puff(s) Inhalation two times a day  ceFAZolin   IVPB 2000 milliGRAM(s) IV Intermittent once  lisinopril 10 milliGRAM(s) Oral daily  metoprolol succinate ER 75 milliGRAM(s) Oral daily  pantoprazole    Tablet 40 milliGRAM(s) Oral before breakfast  senna 2 Tablet(s) Oral at bedtime    MEDICATIONS  (PRN):  acetaminophen     Tablet .. 650 milliGRAM(s) Oral every 4 hours PRN Temp greater or equal to 38C (100.4F), Mild Pain (1 - 3)  ALBUTerol    90 MICROgram(s) HFA Inhaler 1 Puff(s) Inhalation every 6 hours PRN Shortness of Breath and/or Wheezing  cyclobenzaprine 10 milliGRAM(s) Oral three times a day PRN Muscle Spasm  diphenhydrAMINE Injectable 12.5 milliGRAM(s) IV Push once PRN nausea/vomiting  ketorolac 10 milliGRAM(s) Oral every 6 hours PRN Moderate Pain (4 - 6)  ondansetron Injectable 4 milliGRAM(s) IV Push every 6 hours PRN Nausea  oxyCODONE    IR 10 milliGRAM(s) Oral every 4 hours PRN Severe Pain (7 - 10)    Vital Signs Last 24 Hrs  T(C): 36.4 (15 Nov 2021 12:03), Max: 36.9 (2021 16:15)  T(F): 97.6 (15 Nov 2021 12:03), Max: 98.4 (2021 16:15)  HR: 84 (15 Nov 2021 12:) (64 - 84)  BP: 92/54 (15 Nov 2021 12:03) (92/54 - 107/66)  BP(mean): --  RR: 16 (15 Nov 2021 12:03) (16 - 18)  SpO2: 96% (15 Nov 2021 12:03) (96% - 98%)    PHYSICAL EXAM  Constitutional - NAD, Comfortable  HEENT - NCAT, EOMI  Neck - Supple, No limited ROM  Chest - CTA bilaterally, No wheeze, No rhonchi, No crackles  Cardiovascular - RRR, S1S2, No murmurs  Abdomen - BS+, Soft, NTND  Extremities - No C/C/E, No calf tenderness   Neurologic Exam -                 AAO x 3  Motor 4+/5 bl UE and LEs  Sens- decreased distal LEs  No clonus  Psychiatric - Mood stable, Affect WNL                          9.9    11.12 )-----------( 209      ( 2021 08:18 )             32.8     11-14    139  |  106  |  25<H>  ----------------------------<  95  4.8   |  23  |  1.15    Ca    8.9      2021 08:18    Impression:  61 yo with functional deficits secondary to diagnosis of spinal stenosis, neuropathy    Plan:  PT- ROM, Bed Mob, Transfers, Amb w AD and bracing as needed  OT- ADLs, bracing  Prec- Falls, Cardiac  Pain- flexeril, oxycodone prn  Bladder- Amado, check PVRs when d/osmin  Bowels- Dulcolax, Senna  DVT Prophylaxis- SCDs  Skin- Turn q2 h  Dispo- When stable will need Acute Rehab- can tolerate 3h/d of therapies and requires daily physician visits

## 2021-11-13 LAB
ANION GAP SERPL CALC-SCNC: 11 MMOL/L — SIGNIFICANT CHANGE UP (ref 5–17)
BASOPHILS # BLD AUTO: 0.01 K/UL — SIGNIFICANT CHANGE UP (ref 0–0.2)
BASOPHILS NFR BLD AUTO: 0.1 % — SIGNIFICANT CHANGE UP (ref 0–2)
BUN SERPL-MCNC: 23 MG/DL — SIGNIFICANT CHANGE UP (ref 7–23)
CALCIUM SERPL-MCNC: 8.7 MG/DL — SIGNIFICANT CHANGE UP (ref 8.4–10.5)
CHLORIDE SERPL-SCNC: 108 MMOL/L — SIGNIFICANT CHANGE UP (ref 96–108)
CO2 SERPL-SCNC: 20 MMOL/L — LOW (ref 22–31)
CREAT SERPL-MCNC: 1 MG/DL — SIGNIFICANT CHANGE UP (ref 0.5–1.3)
EOSINOPHIL # BLD AUTO: 0 K/UL — SIGNIFICANT CHANGE UP (ref 0–0.5)
EOSINOPHIL NFR BLD AUTO: 0 % — SIGNIFICANT CHANGE UP (ref 0–6)
GLUCOSE SERPL-MCNC: 159 MG/DL — HIGH (ref 70–99)
HCT VFR BLD CALC: 31.3 % — LOW (ref 34.5–45)
HGB BLD-MCNC: 9.8 G/DL — LOW (ref 11.5–15.5)
IMM GRANULOCYTES NFR BLD AUTO: 0.8 % — SIGNIFICANT CHANGE UP (ref 0–1.5)
LYMPHOCYTES # BLD AUTO: 1.19 K/UL — SIGNIFICANT CHANGE UP (ref 1–3.3)
LYMPHOCYTES # BLD AUTO: 11.8 % — LOW (ref 13–44)
MCHC RBC-ENTMCNC: 28.8 PG — SIGNIFICANT CHANGE UP (ref 27–34)
MCHC RBC-ENTMCNC: 31.3 GM/DL — LOW (ref 32–36)
MCV RBC AUTO: 92.1 FL — SIGNIFICANT CHANGE UP (ref 80–100)
MONOCYTES # BLD AUTO: 0.55 K/UL — SIGNIFICANT CHANGE UP (ref 0–0.9)
MONOCYTES NFR BLD AUTO: 5.5 % — SIGNIFICANT CHANGE UP (ref 2–14)
NEUTROPHILS # BLD AUTO: 8.24 K/UL — HIGH (ref 1.8–7.4)
NEUTROPHILS NFR BLD AUTO: 81.8 % — HIGH (ref 43–77)
NRBC # BLD: 0 /100 WBCS — SIGNIFICANT CHANGE UP (ref 0–0)
PLATELET # BLD AUTO: 213 K/UL — SIGNIFICANT CHANGE UP (ref 150–400)
POTASSIUM SERPL-MCNC: 5.3 MMOL/L — SIGNIFICANT CHANGE UP (ref 3.5–5.3)
POTASSIUM SERPL-SCNC: 5.3 MMOL/L — SIGNIFICANT CHANGE UP (ref 3.5–5.3)
RBC # BLD: 3.4 M/UL — LOW (ref 3.8–5.2)
RBC # FLD: 13.7 % — SIGNIFICANT CHANGE UP (ref 10.3–14.5)
SODIUM SERPL-SCNC: 139 MMOL/L — SIGNIFICANT CHANGE UP (ref 135–145)
WBC # BLD: 10.07 K/UL — SIGNIFICANT CHANGE UP (ref 3.8–10.5)
WBC # FLD AUTO: 10.07 K/UL — SIGNIFICANT CHANGE UP (ref 3.8–10.5)

## 2021-11-13 RX ADMIN — Medication 10 MILLIGRAM(S): at 12:24

## 2021-11-13 RX ADMIN — Medication 10 MILLIGRAM(S): at 17:18

## 2021-11-13 RX ADMIN — OXYCODONE HYDROCHLORIDE 10 MILLIGRAM(S): 5 TABLET ORAL at 10:34

## 2021-11-13 RX ADMIN — OXYCODONE HYDROCHLORIDE 10 MILLIGRAM(S): 5 TABLET ORAL at 22:53

## 2021-11-13 RX ADMIN — Medication 10 MILLIGRAM(S): at 00:01

## 2021-11-13 RX ADMIN — Medication 101.6 MILLIGRAM(S): at 05:25

## 2021-11-13 RX ADMIN — Medication 10 MILLIGRAM(S): at 11:54

## 2021-11-13 RX ADMIN — OXYCODONE HYDROCHLORIDE 10 MILLIGRAM(S): 5 TABLET ORAL at 05:23

## 2021-11-13 RX ADMIN — SENNA PLUS 2 TABLET(S): 8.6 TABLET ORAL at 22:22

## 2021-11-13 RX ADMIN — CYCLOBENZAPRINE HYDROCHLORIDE 10 MILLIGRAM(S): 10 TABLET, FILM COATED ORAL at 13:57

## 2021-11-13 RX ADMIN — LISINOPRIL 10 MILLIGRAM(S): 2.5 TABLET ORAL at 05:24

## 2021-11-13 RX ADMIN — OXYCODONE HYDROCHLORIDE 10 MILLIGRAM(S): 5 TABLET ORAL at 17:22

## 2021-11-13 RX ADMIN — Medication 10 MILLIGRAM(S): at 22:22

## 2021-11-13 RX ADMIN — PANTOPRAZOLE SODIUM 40 MILLIGRAM(S): 20 TABLET, DELAYED RELEASE ORAL at 05:24

## 2021-11-13 RX ADMIN — Medication 10 MILLIGRAM(S): at 22:52

## 2021-11-13 RX ADMIN — OXYCODONE HYDROCHLORIDE 10 MILLIGRAM(S): 5 TABLET ORAL at 05:53

## 2021-11-13 RX ADMIN — OXYCODONE HYDROCHLORIDE 10 MILLIGRAM(S): 5 TABLET ORAL at 10:04

## 2021-11-13 RX ADMIN — CYCLOBENZAPRINE HYDROCHLORIDE 10 MILLIGRAM(S): 10 TABLET, FILM COATED ORAL at 22:22

## 2021-11-13 RX ADMIN — BUDESONIDE AND FORMOTEROL FUMARATE DIHYDRATE 2 PUFF(S): 160; 4.5 AEROSOL RESPIRATORY (INHALATION) at 17:19

## 2021-11-13 RX ADMIN — OXYCODONE HYDROCHLORIDE 10 MILLIGRAM(S): 5 TABLET ORAL at 22:23

## 2021-11-13 RX ADMIN — OXYCODONE HYDROCHLORIDE 10 MILLIGRAM(S): 5 TABLET ORAL at 17:48

## 2021-11-13 RX ADMIN — CYCLOBENZAPRINE HYDROCHLORIDE 10 MILLIGRAM(S): 10 TABLET, FILM COATED ORAL at 05:21

## 2021-11-13 RX ADMIN — Medication 10 MILLIGRAM(S): at 17:48

## 2021-11-13 RX ADMIN — Medication 10 MILLIGRAM(S): at 05:51

## 2021-11-13 RX ADMIN — BUDESONIDE AND FORMOTEROL FUMARATE DIHYDRATE 2 PUFF(S): 160; 4.5 AEROSOL RESPIRATORY (INHALATION) at 05:28

## 2021-11-13 RX ADMIN — Medication 10 MILLIGRAM(S): at 05:21

## 2021-11-13 RX ADMIN — Medication 75 MILLIGRAM(S): at 10:01

## 2021-11-13 NOTE — PROGRESS NOTE ADULT - SUBJECTIVE AND OBJECTIVE BOX
Patient is a 60y old  Female who presents with a chief complaint of Back pain, spondylosis (12 Nov 2021 06:16)      POST OPERATIVE DAY #: 2  Patient comfortable  No complaints    T(C): 37.1 (11-13-21 @ 04:19), Max: 37.1 (11-13-21 @ 04:19)  HR: 70 (11-13-21 @ 04:19) (64 - 79)  BP: 105/54 (11-13-21 @ 04:19) (103/56 - 126/72)  RR: 17 (11-13-21 @ 04:19) (16 - 17)  SpO2: 97% (11-13-21 @ 04:19) (95% - 97%)  Wt(kg): --    PHYSICAL EXAM:  NAD, Alert  Back: Dressing C/D/I; sensation grossly intact to light touch; (+) Distal Pulses; No Calf tenderness B/L, PAS           HMV 30/130             [ ] Lower extremeity                  PF          DF         EHL           FHL                                                                                            R   5/5        5/5        5/5       5/5                                                        L      5/5        5/5   5/5     - 5/5      LABS:                        9.8    10.07 )-----------( 213      ( 13 Nov 2021 06:31 )             31.3     11-13    139  |  108  |  23  ----------------------------<  159<H>  5.3   |  20<L>  |  1.00    Ca    8.7      13 Nov 2021 06:31

## 2021-11-14 LAB
ANION GAP SERPL CALC-SCNC: 10 MMOL/L — SIGNIFICANT CHANGE UP (ref 5–17)
BASOPHILS # BLD AUTO: 0.02 K/UL — SIGNIFICANT CHANGE UP (ref 0–0.2)
BASOPHILS NFR BLD AUTO: 0.2 % — SIGNIFICANT CHANGE UP (ref 0–2)
BUN SERPL-MCNC: 25 MG/DL — HIGH (ref 7–23)
CALCIUM SERPL-MCNC: 8.9 MG/DL — SIGNIFICANT CHANGE UP (ref 8.4–10.5)
CHLORIDE SERPL-SCNC: 106 MMOL/L — SIGNIFICANT CHANGE UP (ref 96–108)
CO2 SERPL-SCNC: 23 MMOL/L — SIGNIFICANT CHANGE UP (ref 22–31)
CREAT SERPL-MCNC: 1.15 MG/DL — SIGNIFICANT CHANGE UP (ref 0.5–1.3)
EOSINOPHIL # BLD AUTO: 0.04 K/UL — SIGNIFICANT CHANGE UP (ref 0–0.5)
EOSINOPHIL NFR BLD AUTO: 0.4 % — SIGNIFICANT CHANGE UP (ref 0–6)
GLUCOSE SERPL-MCNC: 95 MG/DL — SIGNIFICANT CHANGE UP (ref 70–99)
HCT VFR BLD CALC: 32.8 % — LOW (ref 34.5–45)
HGB BLD-MCNC: 9.9 G/DL — LOW (ref 11.5–15.5)
IMM GRANULOCYTES NFR BLD AUTO: 0.6 % — SIGNIFICANT CHANGE UP (ref 0–1.5)
LYMPHOCYTES # BLD AUTO: 3.72 K/UL — HIGH (ref 1–3.3)
LYMPHOCYTES # BLD AUTO: 33.5 % — SIGNIFICANT CHANGE UP (ref 13–44)
MCHC RBC-ENTMCNC: 28.5 PG — SIGNIFICANT CHANGE UP (ref 27–34)
MCHC RBC-ENTMCNC: 30.2 GM/DL — LOW (ref 32–36)
MCV RBC AUTO: 94.5 FL — SIGNIFICANT CHANGE UP (ref 80–100)
MONOCYTES # BLD AUTO: 1.05 K/UL — HIGH (ref 0–0.9)
MONOCYTES NFR BLD AUTO: 9.4 % — SIGNIFICANT CHANGE UP (ref 2–14)
NEUTROPHILS # BLD AUTO: 6.22 K/UL — SIGNIFICANT CHANGE UP (ref 1.8–7.4)
NEUTROPHILS NFR BLD AUTO: 55.9 % — SIGNIFICANT CHANGE UP (ref 43–77)
NRBC # BLD: 0 /100 WBCS — SIGNIFICANT CHANGE UP (ref 0–0)
PLATELET # BLD AUTO: 209 K/UL — SIGNIFICANT CHANGE UP (ref 150–400)
POTASSIUM SERPL-MCNC: 4.8 MMOL/L — SIGNIFICANT CHANGE UP (ref 3.5–5.3)
POTASSIUM SERPL-SCNC: 4.8 MMOL/L — SIGNIFICANT CHANGE UP (ref 3.5–5.3)
RBC # BLD: 3.47 M/UL — LOW (ref 3.8–5.2)
RBC # FLD: 13.8 % — SIGNIFICANT CHANGE UP (ref 10.3–14.5)
SODIUM SERPL-SCNC: 139 MMOL/L — SIGNIFICANT CHANGE UP (ref 135–145)
WBC # BLD: 11.12 K/UL — HIGH (ref 3.8–10.5)
WBC # FLD AUTO: 11.12 K/UL — HIGH (ref 3.8–10.5)

## 2021-11-14 RX ORDER — KETOROLAC TROMETHAMINE 30 MG/ML
10 SYRINGE (ML) INJECTION EVERY 6 HOURS
Refills: 0 | Status: DISCONTINUED | OUTPATIENT
Start: 2021-11-14 | End: 2021-11-15

## 2021-11-14 RX ORDER — CYCLOBENZAPRINE HYDROCHLORIDE 10 MG/1
10 TABLET, FILM COATED ORAL THREE TIMES A DAY
Refills: 0 | Status: DISCONTINUED | OUTPATIENT
Start: 2021-11-14 | End: 2021-11-17

## 2021-11-14 RX ADMIN — Medication 10 MILLIGRAM(S): at 22:07

## 2021-11-14 RX ADMIN — OXYCODONE HYDROCHLORIDE 10 MILLIGRAM(S): 5 TABLET ORAL at 17:27

## 2021-11-14 RX ADMIN — Medication 10 MILLIGRAM(S): at 11:14

## 2021-11-14 RX ADMIN — Medication 10 MILLIGRAM(S): at 11:00

## 2021-11-14 RX ADMIN — CYCLOBENZAPRINE HYDROCHLORIDE 10 MILLIGRAM(S): 10 TABLET, FILM COATED ORAL at 21:37

## 2021-11-14 RX ADMIN — OXYCODONE HYDROCHLORIDE 10 MILLIGRAM(S): 5 TABLET ORAL at 18:00

## 2021-11-14 RX ADMIN — Medication 10 MILLIGRAM(S): at 21:37

## 2021-11-14 RX ADMIN — BUDESONIDE AND FORMOTEROL FUMARATE DIHYDRATE 2 PUFF(S): 160; 4.5 AEROSOL RESPIRATORY (INHALATION) at 05:09

## 2021-11-14 RX ADMIN — SENNA PLUS 2 TABLET(S): 8.6 TABLET ORAL at 21:38

## 2021-11-14 RX ADMIN — PANTOPRAZOLE SODIUM 40 MILLIGRAM(S): 20 TABLET, DELAYED RELEASE ORAL at 05:10

## 2021-11-14 RX ADMIN — BUDESONIDE AND FORMOTEROL FUMARATE DIHYDRATE 2 PUFF(S): 160; 4.5 AEROSOL RESPIRATORY (INHALATION) at 17:28

## 2021-11-14 NOTE — PROGRESS NOTE ADULT - SUBJECTIVE AND OBJECTIVE BOX
Patient is a 60y old  Female who presents with a chief complaint of Back pain, spondylosis (12 Nov 2021 06:16)      POST OPERATIVE DAY #:  3  Patient comfortable  No complaints    T(C): 36.7 (11-14-21 @ 04:57), Max: 37.1 (11-13-21 @ 14:20)  HR: 57 (11-14-21 @ 04:57) (57 - 70)  BP: 94/55 (11-14-21 @ 04:57) (93/54 - 127/66)  RR: 18 (11-14-21 @ 04:57) (17 - 18)  SpO2: 99% (11-14-21 @ 04:57) (97% - 100%)  Wt(kg): --    PHYSICAL EXAM:  NAD, Alert  Back: Dressing C/D/I; sensation grossly intact to light touch; (+) Distal Pulses; No Calf tenderness B/L, PAS   HMV 15/25  Voiding without difficulty             [ x] Lower extremeity                  PF          DF         EHL       FHL                                                                                            R        5/5        5/5        5/5       5/5                                                        L         5/5        5/5        5/5       5/5      LABS:                        9.8    10.07 )-----------( 213      ( 13 Nov 2021 06:31 )             31.3     11-13    139  |  108  |  23  ----------------------------<  159<H>  5.3   |  20<L>  |  1.00    Ca    8.7      13 Nov 2021 06:31

## 2021-11-14 NOTE — PROGRESS NOTE ADULT - PROBLEM SELECTOR PROBLEM 1
Spondylosis without myelopathy or radiculopathy, lumbar region
Spondylosis without myelopathy or radiculopathy, lumbar region

## 2021-11-15 PROCEDURE — 99222 1ST HOSP IP/OBS MODERATE 55: CPT

## 2021-11-15 RX ORDER — OXYCODONE HYDROCHLORIDE 5 MG/1
5 TABLET ORAL EVERY 4 HOURS
Refills: 0 | Status: DISCONTINUED | OUTPATIENT
Start: 2021-11-15 | End: 2021-11-17

## 2021-11-15 RX ORDER — ACETAMINOPHEN 500 MG
750 TABLET ORAL ONCE
Refills: 0 | Status: DISCONTINUED | OUTPATIENT
Start: 2021-11-15 | End: 2021-11-17

## 2021-11-15 RX ORDER — KETOROLAC TROMETHAMINE 30 MG/ML
15 SYRINGE (ML) INJECTION ONCE
Refills: 0 | Status: DISCONTINUED | OUTPATIENT
Start: 2021-11-15 | End: 2021-11-15

## 2021-11-15 RX ADMIN — BUDESONIDE AND FORMOTEROL FUMARATE DIHYDRATE 2 PUFF(S): 160; 4.5 AEROSOL RESPIRATORY (INHALATION) at 05:21

## 2021-11-15 RX ADMIN — SENNA PLUS 2 TABLET(S): 8.6 TABLET ORAL at 22:01

## 2021-11-15 RX ADMIN — CYCLOBENZAPRINE HYDROCHLORIDE 10 MILLIGRAM(S): 10 TABLET, FILM COATED ORAL at 05:22

## 2021-11-15 RX ADMIN — OXYCODONE HYDROCHLORIDE 5 MILLIGRAM(S): 5 TABLET ORAL at 16:01

## 2021-11-15 RX ADMIN — OXYCODONE HYDROCHLORIDE 5 MILLIGRAM(S): 5 TABLET ORAL at 14:44

## 2021-11-15 RX ADMIN — CYCLOBENZAPRINE HYDROCHLORIDE 10 MILLIGRAM(S): 10 TABLET, FILM COATED ORAL at 17:52

## 2021-11-15 RX ADMIN — Medication 10 MILLIGRAM(S): at 13:05

## 2021-11-15 RX ADMIN — Medication 15 MILLIGRAM(S): at 22:15

## 2021-11-15 RX ADMIN — Medication 15 MILLIGRAM(S): at 22:45

## 2021-11-15 RX ADMIN — BUDESONIDE AND FORMOTEROL FUMARATE DIHYDRATE 2 PUFF(S): 160; 4.5 AEROSOL RESPIRATORY (INHALATION) at 17:50

## 2021-11-15 RX ADMIN — Medication 10 MILLIGRAM(S): at 12:02

## 2021-11-15 RX ADMIN — Medication 10 MILLIGRAM(S): at 05:21

## 2021-11-15 RX ADMIN — PANTOPRAZOLE SODIUM 40 MILLIGRAM(S): 20 TABLET, DELAYED RELEASE ORAL at 05:22

## 2021-11-15 RX ADMIN — Medication 5 MILLIGRAM(S): at 22:02

## 2021-11-15 RX ADMIN — Medication 10 MILLIGRAM(S): at 05:51

## 2021-11-15 RX ADMIN — LISINOPRIL 10 MILLIGRAM(S): 2.5 TABLET ORAL at 05:22

## 2021-11-15 NOTE — PROGRESS NOTE ADULT - SUBJECTIVE AND OBJECTIVE BOX
Orthopaedic Surgery Progress Note    Subjective:   Patient seen and examined. No acute events overnight.     Objective:  T(C): 36.4 (11-15-21 @ 05:19), Max: 36.9 (11-14-21 @ 16:15)  HR: 68 (11-15-21 @ 05:19) (64 - 89)  BP: 96/56 (11-15-21 @ 05:19) (93/53 - 107/66)  RR: 18 (11-15-21 @ 05:19) (18 - 18)  SpO2: 98% (11-15-21 @ 05:19) (97% - 98%)  Wt(kg): --    11-13 @ 07:01  -  11-14 @ 07:00  --------------------------------------------------------  IN: 320 mL / OUT: 1875 mL / NET: -1555 mL    11-14 @ 07:01  -  11-15 @ 06:42  --------------------------------------------------------  IN: 1100 mL / OUT: 2170 mL / NET: -1070 mL        PHYSICAL EXAM:  NAD, Alert  Back: Dressing C/D/I; sensation grossly intact to light touch; (+) Distal Pulses; No Calf tenderness B/L, PAS   HMV 15/25  Voiding without difficulty             [ x] Lower extremeity                  PF          DF         EHL       FHL                                                                                            R        5/5        5/5        5/5       5/5                                                        L         5/5        5/5        5/5       5/5                          9.9    11.12 )-----------( 209      ( 14 Nov 2021 08:18 )             32.8     11-14    139  |  106  |  25<H>  ----------------------------<  95  4.8   |  23  |  1.15    Ca    8.9      14 Nov 2021 08:18            60y Female s/p T10-12 decompression and T10-11 fusion   - Pain control  - FU labs  - WBAT  - PT/OT/OOB  - I/S  - Monitor HMV output  - SCDs  - Dispo planning

## 2021-11-15 NOTE — PROVIDER CONTACT NOTE (OTHER) - ASSESSMENT
Pt asymptomatic. No c/o chest pain or SOB noted. Adequate urine output. Hemovac drain w/o 100cc over  11hrs. Neurological status unchanged
Pt laying in bed comfortably, remains A&Ox4. HR- 108, BP-104/58, vitals stable otherwise. Pt denies SOB, chest pain, dizziness, headache. Pt c/o back pain 9/10.
Pt is laying in bed comfortably and remains A&Ox4. Pt has a electronic blood pressure of 93/54, vital signs stable otherwise. Pt denies headache, SOB, dizziness and chest pain.
Pt laying in bed comfortably, remains A&Ox4. Pt's blood pressure was 94/55, HR was 57, vitals stable otherwise. Pt denies SOB, chest pain, headache.

## 2021-11-15 NOTE — PROVIDER CONTACT NOTE (OTHER) - ACTION/TREATMENT ORDERED:
MD notified, as per MD Lyn hold lisinopril and metoprolol. No further interventions @ this time. Will cont to monitor.
DEMI Ervin aware, Toradol 15mg IVP given for pain, to continue to monitor HR and BP thru shift. Safety maintained.
MD Lyn made aware, as per MD Lyn, monitor Pts blood pressure, no further interventions at this time. Will continue to monitor.
MD ordered NS 1L IV Bolus  x 1.

## 2021-11-15 NOTE — PROVIDER CONTACT NOTE (OTHER) - BACKGROUND
s/p T 10-12, decompression/fusion
s/p T 10-12, decompression/fusion
s/p T10-12 Decompression w/fusion
11/11: s/p T10-12 decompression/fusion.

## 2021-11-15 NOTE — PROVIDER CONTACT NOTE (OTHER) - SITUATION
pt has hypotension, blood pressure was 94/55.
Pt has a blood pressure of 93/54/
Pt's b/p 94/44 HR 66
Tachycardia

## 2021-11-16 LAB — SARS-COV-2 RNA SPEC QL NAA+PROBE: SIGNIFICANT CHANGE UP

## 2021-11-16 RX ORDER — LACTULOSE 10 G/15ML
20 SOLUTION ORAL DAILY
Refills: 0 | Status: DISCONTINUED | OUTPATIENT
Start: 2021-11-16 | End: 2021-11-17

## 2021-11-16 RX ADMIN — CYCLOBENZAPRINE HYDROCHLORIDE 10 MILLIGRAM(S): 10 TABLET, FILM COATED ORAL at 05:08

## 2021-11-16 RX ADMIN — BUDESONIDE AND FORMOTEROL FUMARATE DIHYDRATE 2 PUFF(S): 160; 4.5 AEROSOL RESPIRATORY (INHALATION) at 17:41

## 2021-11-16 RX ADMIN — OXYCODONE HYDROCHLORIDE 10 MILLIGRAM(S): 5 TABLET ORAL at 22:40

## 2021-11-16 RX ADMIN — CYCLOBENZAPRINE HYDROCHLORIDE 10 MILLIGRAM(S): 10 TABLET, FILM COATED ORAL at 17:41

## 2021-11-16 RX ADMIN — OXYCODONE HYDROCHLORIDE 10 MILLIGRAM(S): 5 TABLET ORAL at 22:10

## 2021-11-16 RX ADMIN — Medication 75 MILLIGRAM(S): at 22:10

## 2021-11-16 RX ADMIN — BUDESONIDE AND FORMOTEROL FUMARATE DIHYDRATE 2 PUFF(S): 160; 4.5 AEROSOL RESPIRATORY (INHALATION) at 06:21

## 2021-11-16 RX ADMIN — PANTOPRAZOLE SODIUM 40 MILLIGRAM(S): 20 TABLET, DELAYED RELEASE ORAL at 05:09

## 2021-11-16 NOTE — PROGRESS NOTE ADULT - SUBJECTIVE AND OBJECTIVE BOX
ORTHO  Patient is a 60y old  Female who presents with a chief complaint of Back pain, spondylosis (12 Nov 2021 06:16)    Pt. resting without complaint    VS-  T(C): 36.9 (11-16-21 @ 05:04), Max: 37.1 (11-16-21 @ 00:36)  HR: 91 (11-16-21 @ 05:04) (78 - 108)  BP: 102/61 (11-16-21 @ 05:04) (92/54 - 129/68)  RR: 18 (11-16-21 @ 05:04) (16 - 18)  SpO2: 99% (11-16-21 @ 05:04) (96% - 99%)  Wt(kg): --    M.S. A&O  Back dressing - min/mod serous drainage     Neuro-              Motor- (+)ROM- ankle and EHL 5/5              Sensation- grossly intact to light touch, with some paresthesias anterior thigh              Calves- soft, nontender- PAS                               9.9    11.12 )-----------( 209      ( 14 Nov 2021 08:18 )             32.8     11-14    139  |  106  |  25<H>  ----------------------------<  95  4.8   |  23  |  1.15    Ca    8.9      14 Nov 2021 08:18

## 2021-11-16 NOTE — PROGRESS NOTE ADULT - ATTENDING COMMENTS
Agree with above, pain improved, walking tolerance continues to improve. Will d/c drain today. Likely to rehab today. Knows to reach out to me at anytime with new/changing symptoms.
Agree with above, pain controlled this AM, neurologically intact, encourage ambulation, pt/ot
Doing well, pain controlled. Walking more and more. Had BM yesterday. Plan for rehab placement.

## 2021-11-17 ENCOUNTER — TRANSCRIPTION ENCOUNTER (OUTPATIENT)
Age: 61
End: 2021-11-17

## 2021-11-17 ENCOUNTER — INPATIENT (INPATIENT)
Facility: HOSPITAL | Age: 61
LOS: 6 days | Discharge: HOME | DRG: 950 | End: 2021-11-24
Attending: INTERNAL MEDICINE | Admitting: INTERNAL MEDICINE
Payer: COMMERCIAL

## 2021-11-17 VITALS
SYSTOLIC BLOOD PRESSURE: 109 MMHG | HEIGHT: 67 IN | RESPIRATION RATE: 16 BRPM | OXYGEN SATURATION: 98 % | WEIGHT: 167.11 LBS | DIASTOLIC BLOOD PRESSURE: 70 MMHG | TEMPERATURE: 99 F | HEART RATE: 108 BPM

## 2021-11-17 VITALS
RESPIRATION RATE: 18 BRPM | OXYGEN SATURATION: 96 % | HEART RATE: 107 BPM | SYSTOLIC BLOOD PRESSURE: 99 MMHG | TEMPERATURE: 99 F | DIASTOLIC BLOOD PRESSURE: 62 MMHG

## 2021-11-17 DIAGNOSIS — Z98.890 OTHER SPECIFIED POSTPROCEDURAL STATES: Chronic | ICD-10-CM

## 2021-11-17 DIAGNOSIS — Z98.891 HISTORY OF UTERINE SCAR FROM PREVIOUS SURGERY: Chronic | ICD-10-CM

## 2021-11-17 DIAGNOSIS — Z98.51 TUBAL LIGATION STATUS: Chronic | ICD-10-CM

## 2021-11-17 DIAGNOSIS — M48.00 SPINAL STENOSIS, SITE UNSPECIFIED: ICD-10-CM

## 2021-11-17 DIAGNOSIS — Z98.49 CATARACT EXTRACTION STATUS, UNSPECIFIED EYE: Chronic | ICD-10-CM

## 2021-11-17 LAB
ANION GAP SERPL CALC-SCNC: 9 MMOL/L — SIGNIFICANT CHANGE UP (ref 5–17)
BUN SERPL-MCNC: 20 MG/DL — SIGNIFICANT CHANGE UP (ref 7–23)
CALCIUM SERPL-MCNC: 9.2 MG/DL — SIGNIFICANT CHANGE UP (ref 8.4–10.5)
CHLORIDE SERPL-SCNC: 101 MMOL/L — SIGNIFICANT CHANGE UP (ref 96–108)
CO2 SERPL-SCNC: 26 MMOL/L — SIGNIFICANT CHANGE UP (ref 22–31)
CREAT SERPL-MCNC: 0.87 MG/DL — SIGNIFICANT CHANGE UP (ref 0.5–1.3)
GLUCOSE SERPL-MCNC: 105 MG/DL — HIGH (ref 70–99)
HCT VFR BLD CALC: 35.2 % — SIGNIFICANT CHANGE UP (ref 34.5–45)
HGB BLD-MCNC: 10.9 G/DL — LOW (ref 11.5–15.5)
MCHC RBC-ENTMCNC: 28.8 PG — SIGNIFICANT CHANGE UP (ref 27–34)
MCHC RBC-ENTMCNC: 31 GM/DL — LOW (ref 32–36)
MCV RBC AUTO: 93.1 FL — SIGNIFICANT CHANGE UP (ref 80–100)
NRBC # BLD: 0 /100 WBCS — SIGNIFICANT CHANGE UP (ref 0–0)
PLATELET # BLD AUTO: 243 K/UL — SIGNIFICANT CHANGE UP (ref 150–400)
POTASSIUM SERPL-MCNC: 5 MMOL/L — SIGNIFICANT CHANGE UP (ref 3.5–5.3)
POTASSIUM SERPL-SCNC: 5 MMOL/L — SIGNIFICANT CHANGE UP (ref 3.5–5.3)
RBC # BLD: 3.78 M/UL — LOW (ref 3.8–5.2)
RBC # FLD: 13.7 % — SIGNIFICANT CHANGE UP (ref 10.3–14.5)
SODIUM SERPL-SCNC: 136 MMOL/L — SIGNIFICANT CHANGE UP (ref 135–145)
WBC # BLD: 8.94 K/UL — SIGNIFICANT CHANGE UP (ref 3.8–10.5)
WBC # FLD AUTO: 8.94 K/UL — SIGNIFICANT CHANGE UP (ref 3.8–10.5)

## 2021-11-17 PROCEDURE — 97530 THERAPEUTIC ACTIVITIES: CPT

## 2021-11-17 PROCEDURE — 97116 GAIT TRAINING THERAPY: CPT

## 2021-11-17 PROCEDURE — P9045: CPT

## 2021-11-17 PROCEDURE — 82565 ASSAY OF CREATININE: CPT

## 2021-11-17 PROCEDURE — C1713: CPT

## 2021-11-17 PROCEDURE — 86769 SARS-COV-2 COVID-19 ANTIBODY: CPT

## 2021-11-17 PROCEDURE — 85018 HEMOGLOBIN: CPT

## 2021-11-17 PROCEDURE — 97161 PT EVAL LOW COMPLEX 20 MIN: CPT

## 2021-11-17 PROCEDURE — 82330 ASSAY OF CALCIUM: CPT

## 2021-11-17 PROCEDURE — 97110 THERAPEUTIC EXERCISES: CPT

## 2021-11-17 PROCEDURE — 76000 FLUOROSCOPY <1 HR PHYS/QHP: CPT

## 2021-11-17 PROCEDURE — 36415 COLL VENOUS BLD VENIPUNCTURE: CPT

## 2021-11-17 PROCEDURE — 83605 ASSAY OF LACTIC ACID: CPT

## 2021-11-17 PROCEDURE — U0005: CPT

## 2021-11-17 PROCEDURE — U0003: CPT

## 2021-11-17 PROCEDURE — 82947 ASSAY GLUCOSE BLOOD QUANT: CPT

## 2021-11-17 PROCEDURE — 84132 ASSAY OF SERUM POTASSIUM: CPT

## 2021-11-17 PROCEDURE — 93970 EXTREMITY STUDY: CPT

## 2021-11-17 PROCEDURE — 84295 ASSAY OF SERUM SODIUM: CPT

## 2021-11-17 PROCEDURE — 82803 BLOOD GASES ANY COMBINATION: CPT

## 2021-11-17 PROCEDURE — 80048 BASIC METABOLIC PNL TOTAL CA: CPT

## 2021-11-17 PROCEDURE — 88304 TISSUE EXAM BY PATHOLOGIST: CPT

## 2021-11-17 PROCEDURE — 85014 HEMATOCRIT: CPT

## 2021-11-17 PROCEDURE — C1889: CPT

## 2021-11-17 PROCEDURE — 82435 ASSAY OF BLOOD CHLORIDE: CPT

## 2021-11-17 PROCEDURE — 85027 COMPLETE CBC AUTOMATED: CPT

## 2021-11-17 PROCEDURE — 85025 COMPLETE CBC W/AUTO DIFF WBC: CPT

## 2021-11-17 PROCEDURE — 97166 OT EVAL MOD COMPLEX 45 MIN: CPT

## 2021-11-17 PROCEDURE — 94640 AIRWAY INHALATION TREATMENT: CPT

## 2021-11-17 RX ORDER — SENNA PLUS 8.6 MG/1
2 TABLET ORAL AT BEDTIME
Refills: 0 | Status: DISCONTINUED | OUTPATIENT
Start: 2021-11-17 | End: 2021-11-24

## 2021-11-17 RX ORDER — ACETAMINOPHEN 500 MG
650 TABLET ORAL EVERY 4 HOURS
Refills: 0 | Status: DISCONTINUED | OUTPATIENT
Start: 2021-11-17 | End: 2021-11-24

## 2021-11-17 RX ORDER — LISINOPRIL 2.5 MG/1
10 TABLET ORAL DAILY
Refills: 0 | Status: DISCONTINUED | OUTPATIENT
Start: 2021-11-17 | End: 2021-11-18

## 2021-11-17 RX ORDER — ALBUTEROL 90 UG/1
1 AEROSOL, METERED ORAL EVERY 6 HOURS
Refills: 0 | Status: DISCONTINUED | OUTPATIENT
Start: 2021-11-17 | End: 2021-11-24

## 2021-11-17 RX ORDER — BUDESONIDE AND FORMOTEROL FUMARATE DIHYDRATE 160; 4.5 UG/1; UG/1
2 AEROSOL RESPIRATORY (INHALATION)
Refills: 0 | Status: DISCONTINUED | OUTPATIENT
Start: 2021-11-17 | End: 2021-11-24

## 2021-11-17 RX ORDER — ASPIRIN/CALCIUM CARB/MAGNESIUM 324 MG
81 TABLET ORAL DAILY
Refills: 0 | Status: DISCONTINUED | OUTPATIENT
Start: 2021-11-18 | End: 2021-11-24

## 2021-11-17 RX ORDER — BUDESONIDE AND FORMOTEROL FUMARATE DIHYDRATE 160; 4.5 UG/1; UG/1
2 AEROSOL RESPIRATORY (INHALATION)
Refills: 0 | Status: DISCONTINUED | OUTPATIENT
Start: 2021-11-17 | End: 2021-11-18

## 2021-11-17 RX ORDER — OXYCODONE HYDROCHLORIDE 5 MG/1
1 TABLET ORAL
Qty: 0 | Refills: 0 | DISCHARGE
Start: 2021-11-17

## 2021-11-17 RX ORDER — ENOXAPARIN SODIUM 100 MG/ML
30 INJECTION SUBCUTANEOUS DAILY
Refills: 0 | Status: DISCONTINUED | OUTPATIENT
Start: 2021-11-18 | End: 2021-11-24

## 2021-11-17 RX ORDER — PANTOPRAZOLE SODIUM 20 MG/1
40 TABLET, DELAYED RELEASE ORAL
Refills: 0 | Status: DISCONTINUED | OUTPATIENT
Start: 2021-11-17 | End: 2021-11-24

## 2021-11-17 RX ORDER — CYCLOBENZAPRINE HYDROCHLORIDE 10 MG/1
10 TABLET, FILM COATED ORAL THREE TIMES A DAY
Refills: 0 | Status: DISCONTINUED | OUTPATIENT
Start: 2021-11-17 | End: 2021-11-18

## 2021-11-17 RX ORDER — ACETAMINOPHEN 500 MG
2 TABLET ORAL
Qty: 0 | Refills: 0 | DISCHARGE
Start: 2021-11-17

## 2021-11-17 RX ORDER — OXYCODONE HYDROCHLORIDE 5 MG/1
10 TABLET ORAL EVERY 4 HOURS
Refills: 0 | Status: DISCONTINUED | OUTPATIENT
Start: 2021-11-17 | End: 2021-11-22

## 2021-11-17 RX ORDER — OXYCODONE HYDROCHLORIDE 5 MG/1
5 TABLET ORAL EVERY 4 HOURS
Refills: 0 | Status: DISCONTINUED | OUTPATIENT
Start: 2021-11-17 | End: 2021-11-22

## 2021-11-17 RX ORDER — METOPROLOL TARTRATE 50 MG
75 TABLET ORAL DAILY
Refills: 0 | Status: DISCONTINUED | OUTPATIENT
Start: 2021-11-17 | End: 2021-11-18

## 2021-11-17 RX ORDER — SODIUM CHLORIDE 9 MG/ML
500 INJECTION INTRAMUSCULAR; INTRAVENOUS; SUBCUTANEOUS ONCE
Refills: 0 | Status: COMPLETED | OUTPATIENT
Start: 2021-11-17 | End: 2021-11-17

## 2021-11-17 RX ADMIN — SENNA PLUS 2 TABLET(S): 8.6 TABLET ORAL at 22:02

## 2021-11-17 RX ADMIN — PANTOPRAZOLE SODIUM 40 MILLIGRAM(S): 20 TABLET, DELAYED RELEASE ORAL at 06:18

## 2021-11-17 RX ADMIN — SODIUM CHLORIDE 500 MILLILITER(S): 9 INJECTION INTRAMUSCULAR; INTRAVENOUS; SUBCUTANEOUS at 14:39

## 2021-11-17 RX ADMIN — BUDESONIDE AND FORMOTEROL FUMARATE DIHYDRATE 2 PUFF(S): 160; 4.5 AEROSOL RESPIRATORY (INHALATION) at 17:41

## 2021-11-17 RX ADMIN — LISINOPRIL 10 MILLIGRAM(S): 2.5 TABLET ORAL at 06:18

## 2021-11-17 RX ADMIN — BUDESONIDE AND FORMOTEROL FUMARATE DIHYDRATE 2 PUFF(S): 160; 4.5 AEROSOL RESPIRATORY (INHALATION) at 06:18

## 2021-11-17 RX ADMIN — OXYCODONE HYDROCHLORIDE 10 MILLIGRAM(S): 5 TABLET ORAL at 22:02

## 2021-11-17 NOTE — H&P ADULT - HISTORY OF PRESENT ILLNESS
This is a 61 y/o female with PMH of HTN, hyperlipidemia chronic CHF, CAD s/p MI 2010, TIA s/p loop recorder placement 2018, left BBB, asthma,  c/o both leg pain associated with paresthesia and difficulty walking, she presents to [facility] on 11/44 for elective T10-T12 decompression/fusion.   she underwent T10-12 decompression, T10-11 fusion on 11/11/2021 with Dr. Salazar.  Patient tolerated procedure well, no post-op complications. She was evaluated postoperatively by physical and occupational therapists and acute rehab was recommended.       This is a 61 y/o female with PMH of HTN, hyperlipidemia, chronic CHF, CAD s/p MI 2010, TIA s/p loop recorder placement 2018, left BBB, asthma,  c/o both leg pain associated with paresthesia and difficulty walking, she presents to [facility] on 11/44 for elective T10-T12 decompression/fusion. she underwent T10-12 decompression, T10-11 fusion on 11/11/2021 with Dr. Salazar.  Patient tolerated procedure well, no post-op complications. She was evaluated postoperatively by physical and occupational therapists and acute rehab was recommended.       This is a 61 y/o female with PMH of HTN, hyperlipidemia, chronic CHF, CAD s/p MI 2010, TIA s/p loop recorder placement 2018, left BBB, asthma,  c/o both leg pain associated with paresthesia and difficulty walking, she presents to [facility] on 11/11 for elective T10-T12 decompression/fusion. she underwent T10-12 decompression, T10-11 fusion on 11/11/2021 with Dr. Salazar.  Patient tolerated procedure well, no post-op complications. She was evaluated postoperatively by physical and occupational therapists and acute rehab was recommended.

## 2021-11-17 NOTE — H&P ADULT - NSHPPHYSICALEXAM_GEN_ALL_CORE
PHYSICAL EXAM  VITALS  T(C): 37.1 (11-17-21 @ 17:18), Max: 37.1 (11-17-21 @ 17:18)  HR: 107 (11-17-21 @ 17:18) (91 - 112)  BP: 99/62 (11-17-21 @ 17:18) (92/55 - 103/61)  RR: 18 (11-17-21 @ 17:18) (18 - 18)  SpO2: 96% (11-17-21 @ 17:18) (96% - 99%)    Gen - NAD, Comfortable  HEENT - NCAT, EOMI, MMM  Neck - Supple, No limited ROM  Pulm - CTAB, No wheeze, No rhonchi, No crackles  Cardiovascular - RRR, S1S2, No murmurs  Abdomen - Soft, NT/ND, +BS  Extremities - No C/C/E, No calf tenderness  Neuro-     Cognitive - AAOx3     Communication - Fluent, No dysarthria     Attention: Intact      Judgement: Good evidence of judgement     Memory: Recall 3 objects immediate and 3 min later         Cranial Nerves - CN 2-12 intact     Motor -                     LEFT    UE - ShAB 5/5, EF 5/5, EE 5/5,  5/5                    RIGHT UE - ShAB 5/5, EF 5/5, EE 5/5,   5/5                    LEFT    LE - HF 3-/5, KE 3-/5, DF 5/5, PF 5/5                    RIGHT LE - HF 5/5, KE 5/5, DF 5/5, PF 5/5        Sensory - Intact to LT, decreased to left shin     Reflexes - DTR Intact, No primitive reflexive     Coordination - FTN intact     Tone - normal  Psychiatric - Mood stable, Affect WNL  Skin:  lumbar spine incision with dressing, fluid filled blister x 1 around incisional dressing  Wounds: None Present PHYSICAL EXAM  VITALS  T(C): 37.1 (11-17-21 @ 17:18), Max: 37.1 (11-17-21 @ 17:18)  HR: 107 (11-17-21 @ 17:18) (91 - 112)  BP: 99/62 (11-17-21 @ 17:18) (92/55 - 103/61)  RR: 18 (11-17-21 @ 17:18) (18 - 18)  SpO2: 96% (11-17-21 @ 17:18) (96% - 99%)    Gen - NAD, Comfortable  HEENT - NCAT, EOMI, MMM  Neck - Supple, No limited ROM  Pulm - CTAB, No wheeze, No rhonchi, No crackles  Cardiovascular - RRR, S1S2, No murmurs  Abdomen - Soft, NT/ND, +BS  Extremities - No C/C/E, No calf tenderness  Neuro-     Cognitive - AAOx3     Communication - Fluent, No dysarthria     Attention: Intact      Judgement: Good evidence of judgement     Memory: Recall 3 objects immediate and 3 min later         Cranial Nerves - CN 2-12 intact     Motor -                     LEFT    UE - ShAB 5/5, EF 5/5, EE 5/5,  5/5                    RIGHT UE - ShAB 5/5, EF 5/5, EE 5/5,   5/5                    LEFT    LE - HF 3-/5, KE 3-/5, DF 5/5, PF 5/5                    RIGHT LE - HF 5/5, KE 5/5, DF 5/5, PF 5/5        Sensory - Intact to LT, decreased to left shin     Reflexes - DTR Intact, No primitive reflexive     Coordination - FTN intact     Tone - normal  Psychiatric - Mood stable, Affect WNL  Skin:  lumbar spine incision with dressing, fluid filled blister x 1 around incisional dressing, skin tear to left gluteal fold  Wounds: None Present

## 2021-11-17 NOTE — H&P ADULT - NSHPSOURCEINFORD_GEN_ALL_CORE
2027 Handoff Report from Operating Room to PACU    Report received from Allan Smith RN and Karan Villa CRNa regarding Maranda Rader. Surgeon(s):  Jordan Simon MD  And Procedure(s) (LRB):  PERIRECTAL ABSCESS EXCISION (N/A)  confirmed   with allergies AND DRESSING discussed. Anesthesia type, drugs, patient history, complications, estimated blood loss, vital signs, intake and output, and last pain medication, lines, reversal medications and temperature were reviewed. 2105 TRANSFER - OUT REPORT:    Verbal report given to [facility]) on Maranda Rader  being transferred to [facility]) for routine post - op       Report consisted of patients Situation, Background, Assessment and   Recommendations(SBAR). Information from the following report(s) SBAR, Kardex, OR Summary, Procedure Summary, Intake/Output and MAR was reviewed with the receiving nurse. Opportunity for questions and clarification was provided.       Patient transported with:   Registered Nurse x 2  Patient chart  Patient belongings Chart(s)/Patient

## 2021-11-17 NOTE — DISCHARGE NOTE PROVIDER - NSDCCPTREATMENT_GEN_ALL_CORE_FT
PRINCIPAL PROCEDURE  Procedure: Fusion, spine, thoracic, posterior approach  Findings and Treatment: thoracic myelopathy

## 2021-11-17 NOTE — PROGRESS NOTE ADULT - SUBJECTIVE AND OBJECTIVE BOX
Patient is a 60y old  Female who presents with a chief complaint of Spondy thoracic spine   Patient s/p T10-12 Decompression, T10-11 Fusion POD#6  Patient comfortable  No complaints    T(C): 36.8 (11-17-21 @ 05:25), Max: 37.6 (11-16-21 @ 08:41)  HR: 91 (11-17-21 @ 05:25) (91 - 112)  BP: 94/59 (11-17-21 @ 05:25) (92/56 - 115/70)  RR: 18 (11-17-21 @ 05:25) (18 - 18)  SpO2: 99% (11-17-21 @ 05:25) (97% - 99%)    PHYSICAL EXAM:  NAD, Alert  Back: Dressing C/D/I; sensation grossly intact to light touch; (+) Distal Pulses; No Calf tenderness B/L, PAS              [ ] Lower extremeity                  PF          DF         EHL       FHL                                                                                            R        5/5        5/5        5/5       5/5                                                        L         4/5        4/5        4/5       4/5

## 2021-11-17 NOTE — H&P ADULT - NSICDXPASTSURGICALHX_GEN_ALL_CORE_FT
PAST SURGICAL HISTORY:  History of      History of cataract surgery     History of tubal ligation     S/P lumpectomy, left breast

## 2021-11-17 NOTE — DISCHARGE NOTE PROVIDER - CARE PROVIDER_API CALL
Ben Salazar (MD)  [facility]  [address], [address]  [place] [address]  Phone: [phone]  Fax: [phone]  Follow Up Time:

## 2021-11-17 NOTE — PROGRESS NOTE ADULT - ASSESSMENT
Impression: Stable       Plan:   Continue present treatment                 Out of bed, ambulate, as tolerated                 Physical therapy follow up                 Continue to monitor    Jung Christensen PA-C  Orthopaedic Surgery  Team pager [phone]  [phone]                   
59 y/o FM s/p T10-12 Decompression, T10-11 Fusion POD#1, physical therapy  Micheline Quinn PA-C  Orthopaedic Surgery  Team pager [phone]  [facility] [phone]  [phone]    
61 y/o FM  s/p T10-12 Decompression, T10-11 Fusion POD#6, acute rehab when bed available  Micheline Quinn PA-C  Orthopaedic Surgery  Team pager [phone]  [facility] [phone]  [phone]  
S/P T10-12 decompression        T10-11 Fusion    Plan    OOB/PT/WBAT  D/C planning         Ashanti Johnson PA-C   Beeper    [phone]    
s/p T10-12 decompression        T10 - 11 Fusion    Plan    D/C Sukh  OOB/PT/WBAT  CK labs  D/C planning       Ashanti Johnson PA-C   Beeper    [phone]

## 2021-11-17 NOTE — H&P ADULT - NSHPREVIEWOFSYSTEMS_GEN_ALL_CORE
REVIEW OF SYSTEMS  Constitutional: No fever, No Chills, No fatigue  HEENT: No eye pain, No visual disturbances, No difficulty hearing  Pulm: No cough,  No shortness of breath  Cardio: No chest pain, No palpitations  GI:  No abdominal pain, No nausea, No vomiting, No diarrhea, No constipation  : No dysuria, No frequency, No hematuria  Neuro: No headaches, No memory loss, + loss of strength, + numbness- left foot, No tremors  Skin: No itching, No rashes, No lesions   Endo: No temperature intolerance  MSK: No joint pain, No joint swelling, + muscle pain, No Neck pain, + back pain  Psych:  No depression, No anxiety

## 2021-11-17 NOTE — PATIENT PROFILE ADULT - SURGICAL SITE DRAINAGE DESCRIPTION
with sutures covered with initial dressing stained with sero sanguinous drainage and a small fluid filled blister at the side

## 2021-11-17 NOTE — H&P ADULT - ASSESSMENT
ASSESSMENT/PLAN  This is a 61 y/o female with PMH of HTN, hyperlipidemia, chronic CHF, CAD s/p MI 2010, TIA s/p loop recorder placement 2018, left BBB, asthma,  c/o both leg pain associated with paresthesia and difficulty walking, she presents to [facility] on 11/11 for elective T10-T12 decompression/fusion with Dr. Salazar. Patient now with gait Instability, ADL impairments and Functional impairments.    # Spinal stenosis  - s/p T10-12 decompression, T10-11 fusion on 11/11/2021 with Dr. Salazar  - Start Comprehensive Rehab Program: PT/OT/ST  - PT: Focused on improving strength, endurance, coordination, balance, functional mobility, and transfers  - OT: Focused on improving strength, fine motor skills, coordination, posture and ADLs.    - Remove staples/sutures post-op day #15 (11/26/2021)    #HTN  - Metoprolol succinate 75mg daily  - Lisinopril 10mg daily    #CAD  - s/p MI in 2010  - TIA s/p loop recorder in 2018  - c/w ASA daily    #Asthma  - Symbicort  BID  - Albuterol     #Pain management  - Tylenol PRN, Oxycodone, flexeril    #DVT ppx  - Lovenox, heparin, SCD, TEDs    #Bowel Regimen  - Senna, dulcolax    #Bladder management  - BS on admission, and q 8 hours (SC if > 400)  - Monitor UO    #FEN   - Diet:  - Supplements:    #Precaution  - Fall, Spinal     #Skin:  - No active issues at this time  - Pressure injury/Skin: Turn Q2hrs while in bed, OOB to Chair, PT/OT     #Sleep:   - Maintain quiet hours and low stim environment.  - Melatonin PRN to maximize participation in therapy during the day.   - Monitor sleep logs    Outpatient Follow-up (Specialty/Name of physician):    Ben Salazar)  [facility]  [address], [address]  [place] [address]  Phone: [phone]  Fax: [phone]    MEDICAL PROGNOSIS: GOOD            REHAB POTENTIAL: GOOD             ESTIMATED DISPOSITION: HOME WITH HOME CARE            ELOS: 10-14 Days   EXPECTED THERAPY:     P.T. 1hr/day       O.T. 1hr/day      S.L.P. 1hr/day     P&O Unnecessary     EXP FREQUENCY: 5 days per 7 day period     PRESCREEN COMPARISON:   I have reviewed the prescreen information and I have found no relevant changes between the preadmission screening and my post admission evaluation     RATIONALE FOR INPATIENT ADMISSION - Patient demonstrates the following: (check all that apply)  [X] Medically appropriate for rehabilitation admission  [X] Has attainable rehab goals with an appropriate initial discharge plan  [X] Has rehabilitation potential (expected to make a significant improvement within a reasonable period of time)   [X] Requires close medical management by a rehab physician, rehab nursing care, Hospitalist and comprehensive interdisciplinary team (including PT, OT, & or SLP, Prosthetics and Orthotics)   ASSESSMENT/PLAN  This is a 61 y/o female with PMH of HTN, hyperlipidemia, chronic CHF, CAD s/p MI 2010, TIA s/p loop recorder placement 2018, left BBB, asthma,  c/o both leg pain associated with paresthesia and difficulty walking, she presents to [facility] on 11/11 for elective T10-T12 decompression/fusion with Dr. Salazar. Patient now with gait Instability, ADL impairments and Functional impairments.    # Spinal stenosis  - s/p T10-12 decompression, T10-11 fusion on 11/11/2021 with Dr. Salazar  - Start Comprehensive Rehab Program: PT/OT/ST  - PT: Focused on improving strength, endurance, coordination, balance, functional mobility, and transfers  - OT: Focused on improving strength, fine motor skills, coordination, posture and ADLs.    - Remove staples/sutures post-op day #15 (11/26/2021)    #HTN  - Metoprolol succinate 75mg daily  - Lisinopril 10mg daily    #CAD  - s/p MI in 2010  - TIA s/p loop recorder in 2018  - c/w ASA daily    #Asthma  - Symbicort  BID  - Albuterol     #Pain management  - Tylenol PRN, Oxycodone PRN, flexeril PRN    #DVT ppx  - Lovenox, SCD, TEDs    #Bowel Regimen  - Senna, dulcolax    #Bladder management  - BS on admission, and q 8 hours (SC if > 400)  - Monitor UO    #FEN   - Diet: Regular    #Precaution  - Fall, Spinal     #Skin:  - Blister to lower back  - Pressure injury/Skin: Turn Q2hrs while in bed, OOB to Chair, PT/OT     #Sleep:   - Maintain quiet hours and low stim environment.  - Melatonin PRN to maximize participation in therapy during the day.   - Monitor sleep logs    Outpatient Follow-up (Specialty/Name of physician):    Ben Salazar)  [facility]  [address], [address]  [place] [address]  Phone: [phone]  Fax: [phone]    MEDICAL PROGNOSIS: GOOD            REHAB POTENTIAL: GOOD             ESTIMATED DISPOSITION: HOME WITH HOME CARE            ELOS: 10-14 Days   EXPECTED THERAPY:     P.T. 1hr/day       O.T. 1hr/day      S.L.P. 1hr/day     P&O Unnecessary     EXP FREQUENCY: 5 days per 7 day period     PRESCREEN COMPARISON:   I have reviewed the prescreen information and I have found no relevant changes between the preadmission screening and my post admission evaluation     RATIONALE FOR INPATIENT ADMISSION - Patient demonstrates the following: (check all that apply)  [X] Medically appropriate for rehabilitation admission  [X] Has attainable rehab goals with an appropriate initial discharge plan  [X] Has rehabilitation potential (expected to make a significant improvement within a reasonable period of time)   [X] Requires close medical management by a rehab physician, rehab nursing care, Hospitalist and comprehensive interdisciplinary team (including PT, OT, & or SLP, Prosthetics and Orthotics)   ASSESSMENT/PLAN  This is a 59 y/o female with PMH of HTN, hyperlipidemia, chronic CHF, CAD s/p MI 2010, TIA s/p loop recorder placement 2018, left BBB, asthma,  c/o both leg pain associated with paresthesia and difficulty walking, she presents to [facility] on 11/11 for elective T10-T12 decompression/fusion with Dr. Salazar. Patient now with gait Instability, ADL impairments and Functional impairments.    --Blister on lower back near surgical wound---wound care referral   # Spinal stenosis  - s/p T10-12 decompression, T10-11 fusion on 11/11/2021 with Dr. Salazar  - Start Comprehensive Rehab Program: PT/OT/ST  - PT: Focused on improving strength, endurance, coordination, balance, functional mobility, and transfers  - OT: Focused on improving strength, fine motor skills, coordination, posture and ADLs.    - Remove staples/sutures post-op day #15 (11/26/2021)    #HTN  - Metoprolol succinate 75mg daily  - Lisinopril 10mg daily    #CAD  - s/p MI in 2010  - TIA s/p loop recorder in 2018  - c/w ASA daily    #Asthma  - Symbicort  BID  - Albuterol     #Pain management  - Tylenol PRN, Oxycodone PRN, flexeril PRN    #DVT ppx  - Lovenox, SCD, TEDs    #Bowel Regimen  - Senna, dulcolax    #Bladder management  - BS on admission, and q 8 hours (SC if > 400)  - Monitor UO    #FEN   - Diet: Regular    #Precaution  - Fall, Spinal     #Skin:  - Blister to lower back  - Pressure injury/Skin:, OOB to Chair, PT/OT     #Sleep:   - Maintain quiet hours and low stim environment.  - Melatonin PRN to maximize participation in therapy during the day.   - Monitor sleep logs    Outpatient Follow-up (Specialty/Name of physician):    Ben Salazar)  [address], [address]  [place] [address]  Phone: [phone]  Fax: [phone]    MEDICAL PROGNOSIS: GOOD            REHAB POTENTIAL: GOOD             ESTIMATED DISPOSITION: HOME WITH HOME CARE            ELOS: 10-14 Days   EXPECTED THERAPY:     P.T. 1hr/day       O.T. 1hr/day      S.L.P. 1hr/day     P&O Unnecessary     EXP FREQUENCY: 5 days per 7 day period     PRESCREEN COMPARISON:   I have reviewed the prescreen information and I have found no relevant changes between the preadmission screening and my post admission evaluation     RATIONALE FOR INPATIENT ADMISSION - Patient demonstrates the following: (check all that apply)  [X] Medically appropriate for rehabilitation admission  [X] Has attainable rehab goals with an appropriate initial discharge plan  [X] Has rehabilitation potential (expected to make a significant improvement within a reasonable period of time)   [X] Requires close medical management by a rehab physician, rehab nursing care, Hospitalist and comprehensive interdisciplinary team (including PT, OT, & or SLP, Prosthetics and Orthotics)

## 2021-11-17 NOTE — DISCHARGE NOTE PROVIDER - NSDCFUSCHEDAPPT_GEN_ALL_CORE_FT
MARLEN MCFARLAND ; 11/29/2021 ; NPP OrthoSurg [address] [facility]  MARLEN MCFARLAND ; 11/30/2021 ; NPP Cardio Electro [phone] [facility]

## 2021-11-17 NOTE — H&P ADULT - NSHPLABSRESULTS_GEN_ALL_CORE
10.9   8.94  )-----------( 243      ( 17 Nov 2021 07:25 )             35.2   11-17    136  |  101  |  20  ----------------------------<  105<H>  5.0   |  26  |  0.87    Ca    9.2      17 Nov 2021 07:17    VA Duplex Lower Ext Vein Scan, Juan Carlos (11.12.21 @ 15:27)     IMPRESSION:  No evidence of deep venous thrombosis in either lower extremity.

## 2021-11-17 NOTE — DISCHARGE NOTE PROVIDER - NSDCCPCAREPLAN_GEN_ALL_CORE_FT
PRINCIPAL DISCHARGE DIAGNOSIS  Diagnosis: Spondylosis without myelopathy or radiculopathy, lumbar region  Assessment and Plan of Treatment:

## 2021-11-17 NOTE — H&P ADULT - ATTENDING COMMENTS
59 y/o F, Admitted to acute rehab treatment 11/17/21 after 10-T12 decompression/fusion on 11/11/21 at [facility] for severe spinal stenosis with LE radicular symptoms and urge incontinence,  Has hx of  HTN, hyperlipidemia, chronic CHF, CAD s/p MI 2010, TIA s/p loop recorder placement 2018, left BBB and, asthma,    Seen and examined  Lives with son, has stairs with hand rails to her bed room.  Ambulating with a cane due to back and knee pain  Independent with ADLs/IIADLs prior to surgery    Reports improvement of urinary symptoms, leg weakness and pain post surgery  now continent of urine, previously had urge incontinence  Reports normal bowel movement  On therapy post surgery ambulating 4OFt with RW CGA    Clinically stable,  Pain controlled  Surgical dressing in situ, lower back, blister Rt lower lumbar region noted, near surgical wound, allevyn tape in situ.  LE weakness Left 3/5, Rt 3-5/g  Commence therapy PT/OT  Wound care for blister on back  Surgical wound care as stated by ortho/spine  Continue routine care--DVT PPX, (Duration as per ortho recs), bowel program, GI protection  Est dc date to be decided at team meeting     S/E with Dr Danielson, Rehab Med resident, patient stable to commence acute rehab treatment

## 2021-11-17 NOTE — H&P ADULT - NSHPSOCIALHISTORY_GEN_ALL_CORE
Smoking - Denied  EtOH - Denied   Drugs - Denied     Patient lives   PTA: Independent in ADLs and ambulation     CURRENT FUNCTIONAL STATUS  Bed Mobility:   Transfers:   Gait: Smoking - Denied  EtOH - Denied   Drugs - Denied     Patient lives he lives with son in a private home with 4 steps to enter (+handrail), first floor set up inside.  PTA: Independent in ADLs and ambulation     CURRENT FUNCTIONAL STATUS  Bed Mobility: Min A, 1 person  Transfers: Min A, 1 person  Gait: CG, 1 person, 40 ft RW

## 2021-11-17 NOTE — DISCHARGE NOTE PROVIDER - NSDCFUADDINST_GEN_ALL_CORE_FT
Continue weight bearing as tolerated ambulation, with rolling walker. Have dressing/sutures/staples removed and steri-strips applied post operative day #15 (11/26/2021)if applicable. Follow up with  in his office 3-4 weeks post op.

## 2021-11-17 NOTE — DISCHARGE NOTE PROVIDER - HOSPITAL COURSE
Reason for Admission  " I have leg pain with numbness and tingling and difficulty walking, I need surgery done to be pain free and walk better"     History of Present Illness:  History of Present Illness    This is a 61 y/o female with PMH of HTN, hyperlipidemia chronic CHF, CAD s/p MI , TIA s/p loop recorder placement , left BBB, asthma,  c/o both leg pain associated with paresthesia and difficulty walking, she presents today for  T10-T12 decompression/fusion  covid swab to be done   at [facility], denies fever, cough, SOB, change in taste/smell  pt will see PCP and cardiologist for evaluation prior to surgery , stop ASA 7 days prior to surgery     Past Medical, Past Surgica History:  PAST MEDICAL HISTORY:  Asthma   CHF (congestive heart failure) chronic  Hyperlipidemia   Hypertension   Idiopathic neuropathy   LBBB (left bundle branch block)   Migraines   Myocardial infarction, unspecified MI type, unspecified artery   TIA (transient ischemic attack) 2018 s/p loop recorder.     PAST SURGICAL HISTORY:  History of    History of cataract surgery   History of tubal ligation   S/P lumpectomy, left breast.     HOSPITAL COURSE:  61 y/o FM underwent T10-12 Decompression, T10-11 fusion on 2021 with Dr. Salazar.  Patient tolerated procedure well.  Patient was evaluated postoperatively by physical and occupational therapists for weight bearing as tolerated ambulation and advised that patient would benefit from admission to rehab facility.  Patient advised to keep surgical incision/dressing clean and dry, and have dressing/sutures/surgical staples removed and steri strips applied post op day #15 (2021)if applicable.  Patient further advised to follow up with Dr. Salazar in his office 3-4 weeks post op.

## 2021-11-17 NOTE — DISCHARGE NOTE PROVIDER - NSDCMRMEDTOKEN_GEN_ALL_CORE_FT
albuterol 90 mcg/inh inhalation aerosol: 1 puff(s) inhaled every 6 hours, As needed, Shortness of Breath and/or Wheezing  Aspirin Enteric Coated 81 mg oral delayed release tablet: 1 tab(s) orally once a day  lisinopril 10 mg oral tablet: 1 tab(s) orally once a day  Metoprolol Succinate ER: 75 milligram(s) orally once a day  oxycodone-acetaminophen 5 mg-325 mg oral tablet: 1 tab(s) orally every 6 hours, As Needed -for severe pain MDD:4   Symbicort 80 mcg-4.5 mcg/inh inhalation aerosol: 2 puff(s) inhaled 2 times a day   acetaminophen 325 mg oral tablet: 2 tab(s) orally every 4 hours, As needed, Temp greater or equal to 38C (100.4F), Mild Pain (1 - 3)  albuterol 90 mcg/inh inhalation aerosol: 1 puff(s) inhaled every 6 hours, As needed, Shortness of Breath and/or Wheezing  Aspirin Enteric Coated 81 mg oral delayed release tablet: 1 tab(s) orally once a day  lisinopril 10 mg oral tablet: 1 tab(s) orally once a day  Metoprolol Succinate ER: 75 milligram(s) orally once a day  oxyCODONE 5 mg oral tablet: 1 tab(s) orally every 4 hours, As needed, Moderate Pain (4 - 6)  Symbicort 80 mcg-4.5 mcg/inh inhalation aerosol: 2 puff(s) inhaled 2 times a day

## 2021-11-18 LAB
ALBUMIN SERPL ELPH-MCNC: 2.8 G/DL — LOW (ref 3.3–5)
ALP SERPL-CCNC: 62 U/L — SIGNIFICANT CHANGE UP (ref 40–120)
ALT FLD-CCNC: 24 U/L — SIGNIFICANT CHANGE UP (ref 10–45)
ANION GAP SERPL CALC-SCNC: 5 MMOL/L — SIGNIFICANT CHANGE UP (ref 5–17)
AST SERPL-CCNC: 20 U/L — SIGNIFICANT CHANGE UP (ref 10–40)
BASOPHILS # BLD AUTO: 0.04 K/UL — SIGNIFICANT CHANGE UP (ref 0–0.2)
BASOPHILS NFR BLD AUTO: 0.5 % — SIGNIFICANT CHANGE UP (ref 0–2)
BILIRUB SERPL-MCNC: 0.7 MG/DL — SIGNIFICANT CHANGE UP (ref 0.2–1.2)
BUN SERPL-MCNC: 19 MG/DL — SIGNIFICANT CHANGE UP (ref 7–23)
CALCIUM SERPL-MCNC: 8.8 MG/DL — SIGNIFICANT CHANGE UP (ref 8.4–10.5)
CHLORIDE SERPL-SCNC: 101 MMOL/L — SIGNIFICANT CHANGE UP (ref 96–108)
CO2 SERPL-SCNC: 29 MMOL/L — SIGNIFICANT CHANGE UP (ref 22–31)
COVID-19 NUCLEOCAPSID GAM AB INTERP: POSITIVE
COVID-19 NUCLEOCAPSID TOTAL GAM ANTIBODY RESULT: 25.1 INDEX — HIGH
COVID-19 SPIKE DOMAIN AB INTERP: POSITIVE
COVID-19 SPIKE DOMAIN ANTIBODY RESULT: >250 U/ML — HIGH
CREAT SERPL-MCNC: 0.89 MG/DL — SIGNIFICANT CHANGE UP (ref 0.5–1.3)
EOSINOPHIL # BLD AUTO: 0.1 K/UL — SIGNIFICANT CHANGE UP (ref 0–0.5)
EOSINOPHIL NFR BLD AUTO: 1.2 % — SIGNIFICANT CHANGE UP (ref 0–6)
GLUCOSE SERPL-MCNC: 113 MG/DL — HIGH (ref 70–99)
HCT VFR BLD CALC: 33.5 % — LOW (ref 34.5–45)
HCV AB S/CO SERPL IA: 0.08 S/CO — SIGNIFICANT CHANGE UP (ref 0–0.99)
HCV AB SERPL-IMP: SIGNIFICANT CHANGE UP
HGB BLD-MCNC: 10.6 G/DL — LOW (ref 11.5–15.5)
IMM GRANULOCYTES NFR BLD AUTO: 0.8 % — SIGNIFICANT CHANGE UP (ref 0–1.5)
LYMPHOCYTES # BLD AUTO: 2.76 K/UL — SIGNIFICANT CHANGE UP (ref 1–3.3)
LYMPHOCYTES # BLD AUTO: 32.5 % — SIGNIFICANT CHANGE UP (ref 13–44)
MCHC RBC-ENTMCNC: 28.7 PG — SIGNIFICANT CHANGE UP (ref 27–34)
MCHC RBC-ENTMCNC: 31.6 GM/DL — LOW (ref 32–36)
MCV RBC AUTO: 90.8 FL — SIGNIFICANT CHANGE UP (ref 80–100)
MONOCYTES # BLD AUTO: 0.93 K/UL — HIGH (ref 0–0.9)
MONOCYTES NFR BLD AUTO: 11 % — SIGNIFICANT CHANGE UP (ref 2–14)
NEUTROPHILS # BLD AUTO: 4.59 K/UL — SIGNIFICANT CHANGE UP (ref 1.8–7.4)
NEUTROPHILS NFR BLD AUTO: 54 % — SIGNIFICANT CHANGE UP (ref 43–77)
NRBC # BLD: 0 /100 WBCS — SIGNIFICANT CHANGE UP (ref 0–0)
PLATELET # BLD AUTO: 223 K/UL — SIGNIFICANT CHANGE UP (ref 150–400)
POTASSIUM SERPL-MCNC: 4.7 MMOL/L — SIGNIFICANT CHANGE UP (ref 3.5–5.3)
POTASSIUM SERPL-SCNC: 4.7 MMOL/L — SIGNIFICANT CHANGE UP (ref 3.5–5.3)
PROT SERPL-MCNC: 6.2 G/DL — SIGNIFICANT CHANGE UP (ref 6–8.3)
RBC # BLD: 3.69 M/UL — LOW (ref 3.8–5.2)
RBC # FLD: 13.4 % — SIGNIFICANT CHANGE UP (ref 10.3–14.5)
SARS-COV-2 IGG+IGM SERPL QL IA: 25.1 INDEX — HIGH
SARS-COV-2 IGG+IGM SERPL QL IA: >250 U/ML — HIGH
SARS-COV-2 IGG+IGM SERPL QL IA: POSITIVE
SARS-COV-2 IGG+IGM SERPL QL IA: POSITIVE
SODIUM SERPL-SCNC: 135 MMOL/L — SIGNIFICANT CHANGE UP (ref 135–145)
WBC # BLD: 8.49 K/UL — SIGNIFICANT CHANGE UP (ref 3.8–10.5)
WBC # FLD AUTO: 8.49 K/UL — SIGNIFICANT CHANGE UP (ref 3.8–10.5)

## 2021-11-18 PROCEDURE — 99223 1ST HOSP IP/OBS HIGH 75: CPT

## 2021-11-18 RX ORDER — METHOCARBAMOL 500 MG/1
500 TABLET, FILM COATED ORAL THREE TIMES A DAY
Refills: 0 | Status: DISCONTINUED | OUTPATIENT
Start: 2021-11-18 | End: 2021-11-24

## 2021-11-18 RX ORDER — METOPROLOL TARTRATE 50 MG
50 TABLET ORAL DAILY
Refills: 0 | Status: DISCONTINUED | OUTPATIENT
Start: 2021-11-18 | End: 2021-11-24

## 2021-11-18 RX ADMIN — ENOXAPARIN SODIUM 30 MILLIGRAM(S): 100 INJECTION SUBCUTANEOUS at 11:45

## 2021-11-18 RX ADMIN — SENNA PLUS 2 TABLET(S): 8.6 TABLET ORAL at 21:30

## 2021-11-18 RX ADMIN — LISINOPRIL 10 MILLIGRAM(S): 2.5 TABLET ORAL at 05:11

## 2021-11-18 RX ADMIN — PANTOPRAZOLE SODIUM 40 MILLIGRAM(S): 20 TABLET, DELAYED RELEASE ORAL at 06:24

## 2021-11-18 RX ADMIN — Medication 5 MILLIGRAM(S): at 21:30

## 2021-11-18 RX ADMIN — BUDESONIDE AND FORMOTEROL FUMARATE DIHYDRATE 2 PUFF(S): 160; 4.5 AEROSOL RESPIRATORY (INHALATION) at 08:37

## 2021-11-18 RX ADMIN — OXYCODONE HYDROCHLORIDE 10 MILLIGRAM(S): 5 TABLET ORAL at 01:26

## 2021-11-18 RX ADMIN — Medication 650 MILLIGRAM(S): at 21:30

## 2021-11-18 RX ADMIN — Medication 81 MILLIGRAM(S): at 11:45

## 2021-11-18 RX ADMIN — BUDESONIDE AND FORMOTEROL FUMARATE DIHYDRATE 2 PUFF(S): 160; 4.5 AEROSOL RESPIRATORY (INHALATION) at 20:39

## 2021-11-18 NOTE — CONSULT NOTE ADULT - SUBJECTIVE AND OBJECTIVE BOX
59 y/o female with PMH of HTN, hyperlipidemia, chronic CHF, CAD s/p MI , TIA s/p loop recorder placement 2018, left BBB, asthma,  c/o both leg pain associated with paresthesia and difficulty walking, she presents to [facility] on  for elective T10-T12 decompression/fusion. she underwent T10-12 decompression, T10-11 fusion on 2021 with Dr. Salazar.  Patient tolerated procedure well, no post-op complications.     seen at the bedside, for medical consultation, c/o back pain, 8/10, aggravated by movements, associated with LLE weakness, no n/v, no sob, no dysuria, is voiding, but is constipated last BM 2 days back       Review of Systems: per hpi, all other negative        Allergies and Intolerances:        Allergies:  	No Known Drug Allergies:   	shellfish: Food, Anaphylaxis    Home Medications:   * Patient Currently Takes Medications as of 2021 10:51 documented in Structured Notes  · 	oxyCODONE 5 mg oral tablet: 1 tab(s) orally every 4 hours, As needed, Moderate Pain (4 - 6)  · 	acetaminophen 325 mg oral tablet: 2 tab(s) orally every 4 hours, As needed, Temp greater or equal to 38C (100.4F), Mild Pain (1 - 3)  · 	Symbicort 80 mcg-4.5 mcg/inh inhalation aerosol: 2 puff(s) inhaled 2 times a day  · 	albuterol 90 mcg/inh inhalation aerosol: 1 puff(s) inhaled every 6 hours, As needed, Shortness of Breath and/or Wheezing  · 	Aspirin Enteric Coated 81 mg oral delayed release tablet: 1 tab(s) orally once a day  · 	lisinopril 10 mg oral tablet: 1 tab(s) orally once a day  · 	Metoprolol Succinate ER: 75 milligram(s) orally once a day    .    Patient History:    Past Medical, Past Surgical, and Family History:  PAST MEDICAL HISTORY:  Asthma     CHF (congestive heart failure) chronic    Hyperlipidemia     Hypertension     Idiopathic neuropathy     LBBB (left bundle branch block)     Migraines     Myocardial infarction, unspecified MI type, unspecified artery     TIA (transient ischemic attack) 2018 s/p loop recorder.     PAST SURGICAL HISTORY:  History of      History of cataract surgery     History of tubal ligation     S/P lumpectomy, left breast.     FAMILY HISTORY:  Father  at age 25, CAD  Mother, ALIVE AT 85, HTN     Social History:    Smoking - Denied  EtOH - Denied   Drugs - Denied       Physical Exam:     Vital Signs Last 24 Hrs  T(C): 37.1 (2021 08:07), Max: 37.4 (2021 20:15)  T(F): 98.8 (2021 08:07), Max: 99.4 (2021 20:15)  HR: 90 (2021 08:07) (90 - 108)  BP: 101/68 (2021 08:07) (98/64 - 109/70)  BP(mean): --  RR: 15 (2021 08:07) (15 - 18)  SpO2: 99% (2021 08:07) (96% - 99%)      GENERAL- NAD  EAR/NOSE/MOUTH/THROAT - no pharyngeal exudates, no oral lesions  MMM  EYES- LUCIAN, conjunctiva and Sclera clear  NECK- supple  RESPIRATORY-  clear to auscultation bilaterally, non laboured breathing  CARDIOVASCULAR - SIS2, RRR  GI - soft NT BS present  EXTREMITIES- no pedal edema  NEUROLOGY- LLE weakness  SKIN- no rashes, warm to touch  PSYCHIATRY- AAO X 3  MUSCULOSKELETAL- AROM diminished to LLE                    10.6                 135  | 29   | 19           8.49  >-----------< 223     ------------------------< 113                   33.5                 4.7  | 101  | 0.89                                         Ca 8.8   Mg x     Ph x            CAPILLARY BLOOD GLUCOSE          Labs reviewed:     < from: VA Duplex Lower Ext Vein Scan, Bilat (21 @ 15:27) >  IMPRESSION:  No evidence of deep venous thrombosis in either lower extremity.    < end of copied text >      ECG reviewed and interpreted: < from: 12 Lead ECG (19 @ 12:14) >  Ventricular Rate 63 BPM    Atrial Rate 63 BPM    P-R Interval 174 ms    QRS Duration 122 ms    Q-T Interval 402 ms    QTC Calculation(Bezet) 411 ms    P Axis 62 degrees    R Axis -33 degrees    T Axis 53 degrees    Diagnosis Line Normal sinus rhythm  Left axis deviation  Left bundle branch block  Abnormal ECG    < end of copied text >    < from: CT Thoracic Spine No Cont (21 @ 18:17) >  IMPRESSION:  Multilevel thoracic spondylosis with multilevel disc osteophyte complexes extending into the neural foramen.  Large disc herniation at T10/T11 is better appreciated on previously performed MRI.    < end of copied text >    MEDICATIONS  (STANDING):  aspirin  chewable 81 milliGRAM(s) Oral daily  bisacodyl 5 milliGRAM(s) Oral at bedtime  budesonide  80 MICROgram(s)/formoterol 4.5 MICROgram(s) Inhaler 2 Puff(s) Inhalation two times a day  budesonide  80 MICROgram(s)/formoterol 4.5 MICROgram(s) Inhaler 2 Puff(s) Inhalation two times a day  enoxaparin Injectable 30 milliGRAM(s) SubCutaneous daily  metoprolol succinate ER 75 milliGRAM(s) Oral daily  pantoprazole    Tablet 40 milliGRAM(s) Oral before breakfast  senna 2 Tablet(s) Oral at bedtime    MEDICATIONS  (PRN):  acetaminophen     Tablet .. 650 milliGRAM(s) Oral every 4 hours PRN Temp greater or equal to 38C (100.4F), Mild Pain (1 - 3)  ALBUTerol    90 MICROgram(s) HFA Inhaler 1 Puff(s) Inhalation every 6 hours PRN Shortness of Breath and/or Wheezing  cyclobenzaprine 10 milliGRAM(s) Oral three times a day PRN Muscle Spasm  oxyCODONE    IR 10 milliGRAM(s) Oral every 4 hours PRN Severe Pain (7 - 10)  oxyCODONE    IR 5 milliGRAM(s) Oral every 4 hours PRN Moderate Pain (4 - 6)

## 2021-11-18 NOTE — DIETITIAN INITIAL EVALUATION ADULT. - PERSON TAUGHT/METHOD
Heart healthy/ low sodium/verbal instruction/patient instructed/teach back - (Patient repeats in own words)

## 2021-11-18 NOTE — PROGRESS NOTE ADULT - ASSESSMENT
59 y/o female with PMH of HTN, hyperlipidemia, chronic CHF, CAD s/p MI 2010, TIA s/p loop recorder placement 2018, left BBB, asthma,  c/o both leg pain associated with paresthesia and difficulty walking, she presents to [facility] on 11/11 for elective T10-T12 decompression/fusion with Dr. Salazar. Patient now with gait Instability, ADL impairments and Functional impairments.    # Spinal stenosis  - s/p T10-12 decompression, T10-11 posterior fusion on 11/11/2021 with Dr. Salazar  - Start Comprehensive Rehab Program: PT/OT/ST  - PT: Focused on improving strength, endurance, coordination, balance, functional mobility, and transfers  - OT: Focused on improving strength, fine motor skills, coordination, posture and ADLs.    - Remove staples/sutures post-op day #15 (11/26/2021)    #HTN  - Metoprolol succinate 75mg daily  - Lisinopril 10mg daily    #CAD  - s/p MI in 2010  - TIA s/p loop recorder in 2018  - c/w ASA daily    #Asthma  - Symbicort  BID  - Albuterol     #Pain management  - Tylenol PRN, Oxycodone PRN, flexeril PRN    #DVT ppx  - Lovenox, SCD, TEDs    #Bowel Regimen  - Senna, dulcolax    #Bladder management  - BS on admission, and q 8 hours (SC if > 400)  - Monitor UO    #FEN   - Diet: Regular    #Precaution  - Fall, Spinal     #Skin:  - Blister to lower back - covered w/ alyvene only, currently.  - wound care consult for recs d/t proximity to area of incision  - Pressure injury/Skin: Turn Q2hrs while in bed, OOB to Chair, PT/OT     #Sleep:   - Maintain quiet hours and low stim environment.  - Melatonin PRN to maximize participation in therapy during the day.   - Monitor sleep logs    Outpatient Follow-up (Specialty/Name of physician):    Ben Salazar)  [address], [address]  [place] [address]  Phone: [phone]  Fax: [phone]

## 2021-11-18 NOTE — PROGRESS NOTE ADULT - ASSESSMENT
59 y/o female with PMH of HTN, hyperlipidemia, chronic CHF, CAD s/p MI 2010, TIA s/p loop recorder placement 2018, left BBB, asthma,  c/o both leg pain associated with paresthesia and difficulty walking, she presents to [facility] on 11/11 for elective T10-T12 decompression/fusion with Dr. Salazar. Patient now with gait Instability, ADL impairments and Functional impairments- pt/ot/dvt ppx, pain meds    # Spinal stenosis  - s/p T10-12 decompression, T10-11 fusion on 11/11/2021 with Dr. Salazar  - pain meds, dvt ppx    #HTN- BP noted  - Metoprolol succinate 75mg daily  - stop Lisinopril 10mg daily for now  - < from: Transthoracic Echocardiogram (11.20.18 @ 22:16) >  Ejection Fraction (Modified Wright Rule): 51 %  Low normal left ventricular systolic function.     #CAD  - s/p MI in 2010  - TIA s/p loop recorder in 2018  - c/w ASA daily    #Asthma  - Symbicort  BID  - Albuterol     #Pain management  - Tylenol PRN, Oxycodone PRN, flexeril PRN    #DVT ppx  - Lovenox    will follow  d/w dr. clifford

## 2021-11-18 NOTE — CONSULT NOTE ADULT - ASSESSMENT
61 y/o female with PMH of HTN, hyperlipidemia, chronic CHF, CAD s/p MI 2010, TIA s/p loop recorder placement 2018, left BBB, asthma,  c/o both leg pain associated with paresthesia and difficulty walking, she presents to [facility] on 11/11 for elective T10-T12 decompression/fusion with Dr. Salazar. Patient now with gait Instability, ADL impairments and Functional impairments- pt/ot/dvt ppx, pain meds    # Spinal stenosis  - s/p T10-12 decompression, T10-11 fusion on 11/11/2021 with Dr. Salazar  - pain meds, dvt ppx    #HTN- BP noted  - Metoprolol succinate 75mg daily  - stop Lisinopril 10mg daily for now  - < from: Transthoracic Echocardiogram (11.20.18 @ 22:16) >  Ejection Fraction (Modified Wright Rule): 51 %  Low normal left ventricular systolic function.     #CAD  - s/p MI in 2010  - TIA s/p loop recorder in 2018  - c/w ASA daily    #Asthma  - Symbicort  BID  - Albuterol     #Pain management  - Tylenol PRN, Oxycodone PRN, flexeril PRN    #DVT ppx  - Lovenox    will follow  d/w dr. clifford

## 2021-11-18 NOTE — DIETITIAN INITIAL EVALUATION ADULT. - OTHER INFO
61 y/o female with PMH of HTN, hyperlipidemia, chronic CHF, CAD s/p MI 2010, TIA s/p loop recorder placement 2018, left BBB, asthma,  c/o both leg pain associated with paresthesia and difficulty walking, she presents to [facility] on 11/11 for elective T10-T12 decompression/fusion with Dr. Salazar. Patient now with gait Instability, ADL impairments and Functional impairments.  Pt tolerating diet with report of good appetite on current diet. She denies any recent weight changes. UBW 162lbs. Pt with hx HLD, HTN, CHF. Recommend changing diet to DASH/TLC, education reviewed with pt. Pt denies GI distress, last BM 11/16. Surgical incision thoracic/lumbar spine. + 1 edema bilateral legs. Menu ordering procedures reviewed.

## 2021-11-18 NOTE — PROGRESS NOTE ADULT - SUBJECTIVE AND OBJECTIVE BOX
HPI:  This is a 59 y/o female with PMH of HTN, hyperlipidemia, chronic CHF, CAD s/p MI 2010, TIA s/p loop recorder placement 2018, left BBB, asthma,  c/o both leg pain associated with paresthesia and difficulty walking, she presents to [facility] on 11/44 for elective T10-T12 decompression/fusion. she underwent T10-12 decompression, T10-11 fusion on 11/11/2021 with Dr. Salazar.  Patient tolerated procedure well, no post-op complications. She was evaluated postoperatively by physical and occupational therapists and acute rehab was recommended.       (17 Nov 2021 14:58)      INTERVAL HPI/OVERNIGHT EVENTS:  Chart Reviewed and patient seen at bedside.  Patient still w/ back pain, but improved compared to before surgery.  Left leg still weak, but strength is better compared to pre-surgery as reported by the patient.  No BM since Tuesday. - will cont to monitor  Otherwise no complaints or issues    ROS:  Denies fevers, chills, chest pain, shortness of breath, abdominal pain, headaches, nausea/vomiting    Vital Signs Last 24 Hrs  T(C): 37.1 (18 Nov 2021 08:07), Max: 37.4 (17 Nov 2021 20:15)  T(F): 98.8 (18 Nov 2021 08:07), Max: 99.4 (17 Nov 2021 20:15)  HR: 90 (18 Nov 2021 08:07) (90 - 108)  BP: 101/68 (18 Nov 2021 08:07) (98/64 - 109/70)  BP(mean): --  RR: 15 (18 Nov 2021 08:07) (15 - 18)  SpO2: 99% (18 Nov 2021 08:07) (96% - 99%)    Physical Exam:  Gen - NAD, Comfortable  HEENT - NCAT, EOMI, MMM  Neck - Supple, No limited ROM  Pulm - CTAB, No wheeze, No rhonchi, No crackles  Cardiovascular - RRR, S1S2, No murmurs  Abdomen - Soft, NT/ND, +BS  Extremities - No C/C/E, No calf tenderness  Neuro-     Cognitive - AAOx3     Communication - Fluent, No dysarthria     Attention: Intact      Judgement: Good evidence of judgement     Memory: Recall 3 objects immediate and 3 min later         Cranial Nerves - CN 2-12 intact     Motor -                     LEFT    UE - ShAB 5/5, EF 5/5, EE 5/5,  5/5                    RIGHT UE - ShAB 5/5, EF 5/5, EE 5/5,   5/5                    LEFT    LE - HF 3-/5, KE 3-/5, DF 5/5, PF 5/5                    RIGHT LE - HF 5/5, KE 5/5, DF 5/5, PF 5/5        Sensory - Intact to LT, decreased to left shin     Reflexes - DTR Intact, No primitive reflexive     Coordination - FTN intact     Tone - normal  Psychiatric - Mood stable, Affect WNL  Skin:  lumbar spine incision with dressing, fluid filled blister x 1 around incisional dressing, skin tear to left gluteal fold    LABS:  11-18    135  |  101  |  19  ----------------------------<  113<H>  4.7   |  29  |  0.89  11-17    136  |  101  |  20  ----------------------------<  105<H>  5.0   |  26  |  0.87    Ca    8.8      18 Nov 2021 06:09  Ca    9.2      17 Nov 2021 07:17    TPro  6.2  /  Alb  2.8<L>  /  TBili  0.7  /  DBili  x   /  AST  20  /  ALT  24  /  AlkPhos  62  11-18                                              10.6   8.49  )-----------( 223      ( 18 Nov 2021 06:09 )             33.5                         10.9   8.94  )-----------( 243      ( 17 Nov 2021 07:25 )             35.2     CAPILLARY BLOOD GLUCOSE          MEDICATIONS:  MEDICATIONS  (STANDING):  aspirin  chewable 81 milliGRAM(s) Oral daily  bisacodyl 5 milliGRAM(s) Oral at bedtime  budesonide  80 MICROgram(s)/formoterol 4.5 MICROgram(s) Inhaler 2 Puff(s) Inhalation two times a day  budesonide  80 MICROgram(s)/formoterol 4.5 MICROgram(s) Inhaler 2 Puff(s) Inhalation two times a day  enoxaparin Injectable 30 milliGRAM(s) SubCutaneous daily  lisinopril 10 milliGRAM(s) Oral daily  metoprolol succinate ER 75 milliGRAM(s) Oral daily  pantoprazole    Tablet 40 milliGRAM(s) Oral before breakfast  senna 2 Tablet(s) Oral at bedtime    MEDICATIONS  (PRN):  acetaminophen     Tablet .. 650 milliGRAM(s) Oral every 4 hours PRN Temp greater or equal to 38C (100.4F), Mild Pain (1 - 3)  ALBUTerol    90 MICROgram(s) HFA Inhaler 1 Puff(s) Inhalation every 6 hours PRN Shortness of Breath and/or Wheezing  cyclobenzaprine 10 milliGRAM(s) Oral three times a day PRN Muscle Spasm  oxyCODONE    IR 10 milliGRAM(s) Oral every 4 hours PRN Severe Pain (7 - 10)  oxyCODONE    IR 5 milliGRAM(s) Oral every 4 hours PRN Moderate Pain (4 - 6)         HPI:  This is a 59 y/o female with PMH of HTN, hyperlipidemia, chronic CHF, CAD s/p MI 2010, TIA s/p loop recorder placement 2018, left BBB, asthma,  c/o both leg pain associated with paresthesia and difficulty walking, she presents to [facility] on 11/11 for elective T10-T12 decompression/fusion. she underwent T10-12 decompression, T10-11 fusion on 11/11/2021 with Dr. Salazar.  Patient tolerated procedure well, no post-op complications. She was evaluated postoperatively by physical and occupational therapists and acute rehab was recommended.       (17 Nov 2021 14:58)      INTERVAL HPI/OVERNIGHT EVENTS:  Chart Reviewed and patient seen at bedside.  Patient still w/ back pain, but improved compared to before surgery.  Left leg still weak, but strength is better compared to pre-surgery as reported by the patient.  No BM since Tuesday. - will cont to monitor  Otherwise no complaints or issues    ROS:  Denies fevers, chills, chest pain, shortness of breath, abdominal pain, headaches, nausea/vomiting    Vital Signs Last 24 Hrs  T(C): 37.1 (18 Nov 2021 08:07), Max: 37.4 (17 Nov 2021 20:15)  T(F): 98.8 (18 Nov 2021 08:07), Max: 99.4 (17 Nov 2021 20:15)  HR: 90 (18 Nov 2021 08:07) (90 - 108)  BP: 101/68 (18 Nov 2021 08:07) (98/64 - 109/70)  BP(mean): --  RR: 15 (18 Nov 2021 08:07) (15 - 18)  SpO2: 99% (18 Nov 2021 08:07) (96% - 99%)    Physical Exam:  Gen - NAD, Comfortable  HEENT - NCAT, EOMI, MMM  Neck - Supple, No limited ROM  Pulm - CTAB, No wheeze, No rhonchi, No crackles  Cardiovascular - RRR, S1S2, No murmurs  Abdomen - Soft, NT/ND, +BS  Extremities - No C/C/E, No calf tenderness  Neuro-     Cognitive - AAOx3     Communication - Fluent, No dysarthria     Attention: Intact      Judgement: Good evidence of judgement     Memory: Recall 3 objects immediate and 3 min later         Cranial Nerves - CN 2-12 intact     Motor -                     LEFT    UE - ShAB 5/5, EF 5/5, EE 5/5,  5/5                    RIGHT UE - ShAB 5/5, EF 5/5, EE 5/5,   5/5                    LEFT    LE - HF 3-/5, KE 3-/5, DF 5/5, PF 5/5                    RIGHT LE - HF 5/5, KE 5/5, DF 5/5, PF 5/5        Sensory - Intact to LT, decreased to left shin     Reflexes - DTR Intact, No primitive reflexive     Coordination - FTN intact     Tone - normal  Psychiatric - Mood stable, Affect WNL  Skin:  lumbar spine incision with dressing, fluid filled blister x 1 around incisional dressing, skin tear to left gluteal fold    LABS:  11-18    135  |  101  |  19  ----------------------------<  113<H>  4.7   |  29  |  0.89  11-17    136  |  101  |  20  ----------------------------<  105<H>  5.0   |  26  |  0.87    Ca    8.8      18 Nov 2021 06:09  Ca    9.2      17 Nov 2021 07:17    TPro  6.2  /  Alb  2.8<L>  /  TBili  0.7  /  DBili  x   /  AST  20  /  ALT  24  /  AlkPhos  62  11-18                                              10.6   8.49  )-----------( 223      ( 18 Nov 2021 06:09 )             33.5                         10.9   8.94  )-----------( 243      ( 17 Nov 2021 07:25 )             35.2     CAPILLARY BLOOD GLUCOSE          MEDICATIONS:  MEDICATIONS  (STANDING):  aspirin  chewable 81 milliGRAM(s) Oral daily  bisacodyl 5 milliGRAM(s) Oral at bedtime  budesonide  80 MICROgram(s)/formoterol 4.5 MICROgram(s) Inhaler 2 Puff(s) Inhalation two times a day  budesonide  80 MICROgram(s)/formoterol 4.5 MICROgram(s) Inhaler 2 Puff(s) Inhalation two times a day  enoxaparin Injectable 30 milliGRAM(s) SubCutaneous daily  lisinopril 10 milliGRAM(s) Oral daily  metoprolol succinate ER 75 milliGRAM(s) Oral daily  pantoprazole    Tablet 40 milliGRAM(s) Oral before breakfast  senna 2 Tablet(s) Oral at bedtime    MEDICATIONS  (PRN):  acetaminophen     Tablet .. 650 milliGRAM(s) Oral every 4 hours PRN Temp greater or equal to 38C (100.4F), Mild Pain (1 - 3)  ALBUTerol    90 MICROgram(s) HFA Inhaler 1 Puff(s) Inhalation every 6 hours PRN Shortness of Breath and/or Wheezing  cyclobenzaprine 10 milliGRAM(s) Oral three times a day PRN Muscle Spasm  oxyCODONE    IR 10 milliGRAM(s) Oral every 4 hours PRN Severe Pain (7 - 10)  oxyCODONE    IR 5 milliGRAM(s) Oral every 4 hours PRN Moderate Pain (4 - 6)

## 2021-11-18 NOTE — DIETITIAN INITIAL EVALUATION ADULT. - REASON FOR ADMISSION
Please contact our office if you have any questions about your visit today.
T10-T12 decompression/fusion

## 2021-11-18 NOTE — PROGRESS NOTE ADULT - SUBJECTIVE AND OBJECTIVE BOX
61 y/o female with PMH of HTN, hyperlipidemia, chronic CHF, CAD s/p MI , TIA s/p loop recorder placement 2018, left BBB, asthma,  c/o both leg pain associated with paresthesia and difficulty walking, she presents to [facility] on  for elective T10-T12 decompression/fusion. she underwent T10-12 decompression, T10-11 fusion on 2021 with Dr. Salazar.  Patient tolerated procedure well, no post-op complications.     seen at the bedside, for medical consultation, c/o back pain, 8/10, aggravated by movements, associated with LLE weakness, no n/v, no sob, no dysuria, is voiding, but is constipated last BM 2 days back       Review of Systems: per hpi, all other negative        Allergies and Intolerances:        Allergies:  	No Known Drug Allergies:   	shellfish: Food, Anaphylaxis    Home Medications:   * Patient Currently Takes Medications as of 2021 10:51 documented in Structured Notes  · 	oxyCODONE 5 mg oral tablet: 1 tab(s) orally every 4 hours, As needed, Moderate Pain (4 - 6)  · 	acetaminophen 325 mg oral tablet: 2 tab(s) orally every 4 hours, As needed, Temp greater or equal to 38C (100.4F), Mild Pain (1 - 3)  · 	Symbicort 80 mcg-4.5 mcg/inh inhalation aerosol: 2 puff(s) inhaled 2 times a day  · 	albuterol 90 mcg/inh inhalation aerosol: 1 puff(s) inhaled every 6 hours, As needed, Shortness of Breath and/or Wheezing  · 	Aspirin Enteric Coated 81 mg oral delayed release tablet: 1 tab(s) orally once a day  · 	lisinopril 10 mg oral tablet: 1 tab(s) orally once a day  · 	Metoprolol Succinate ER: 75 milligram(s) orally once a day    .    Patient History:    Past Medical, Past Surgical, and Family History:  PAST MEDICAL HISTORY:  Asthma     CHF (congestive heart failure) chronic    Hyperlipidemia     Hypertension     Idiopathic neuropathy     LBBB (left bundle branch block)     Migraines     Myocardial infarction, unspecified MI type, unspecified artery     TIA (transient ischemic attack) 2018 s/p loop recorder.     PAST SURGICAL HISTORY:  History of      History of cataract surgery     History of tubal ligation     S/P lumpectomy, left breast.     FAMILY HISTORY:  Father  at age 25, CAD  Mother, ALIVE AT 85, HTN     Social History:    Smoking - Denied  EtOH - Denied   Drugs - Denied       Physical Exam:     Vital Signs Last 24 Hrs  T(C): 37.1 (2021 08:07), Max: 37.4 (2021 20:15)  T(F): 98.8 (2021 08:07), Max: 99.4 (2021 20:15)  HR: 90 (2021 08:07) (90 - 108)  BP: 101/68 (2021 08:07) (98/64 - 109/70)  BP(mean): --  RR: 15 (2021 08:07) (15 - 18)  SpO2: 99% (2021 08:07) (96% - 99%)      GENERAL- NAD  EAR/NOSE/MOUTH/THROAT - no pharyngeal exudates, no oral lesions  MMM  EYES- LUCIAN, conjunctiva and Sclera clear  NECK- supple  RESPIRATORY-  clear to auscultation bilaterally, non laboured breathing  CARDIOVASCULAR - SIS2, RRR  GI - soft NT BS present  EXTREMITIES- no pedal edema  NEUROLOGY- LLE weakness  SKIN- no rashes, warm to touch  PSYCHIATRY- AAO X 3  MUSCULOSKELETAL- AROM diminished to LLE                    10.6                 135  | 29   | 19           8.49  >-----------< 223     ------------------------< 113                   33.5                 4.7  | 101  | 0.89                                         Ca 8.8   Mg x     Ph x            CAPILLARY BLOOD GLUCOSE          Labs reviewed:     < from: [facility] Duplex Lower Ext Vein Scan, Bilat (21 @ 15:27) >  IMPRESSION:  No evidence of deep venous thrombosis in either lower extremity.    < end of copied text >      ECG reviewed and interpreted: < from: 12 Lead ECG (19 @ 12:14) >  Ventricular Rate 63 BPM    Atrial Rate 63 BPM    P-R Interval 174 ms    QRS Duration 122 ms    Q-T Interval 402 ms    QTC Calculation(Bezet) 411 ms    P Axis 62 degrees    R Axis -33 degrees    T Axis 53 degrees    Diagnosis Line Normal sinus rhythm  Left axis deviation  Left bundle branch block  Abnormal ECG    < end of copied text >    < from: CT Thoracic Spine No Cont (21 @ 18:17) >  IMPRESSION:  Multilevel thoracic spondylosis with multilevel disc osteophyte complexes extending into the neural foramen.  Large disc herniation at T10/T11 is better appreciated on previously performed MRI.    < end of copied text >    MEDICATIONS  (STANDING):  aspirin  chewable 81 milliGRAM(s) Oral daily  bisacodyl 5 milliGRAM(s) Oral at bedtime  budesonide  80 MICROgram(s)/formoterol 4.5 MICROgram(s) Inhaler 2 Puff(s) Inhalation two times a day  budesonide  80 MICROgram(s)/formoterol 4.5 MICROgram(s) Inhaler 2 Puff(s) Inhalation two times a day  enoxaparin Injectable 30 milliGRAM(s) SubCutaneous daily  metoprolol succinate ER 75 milliGRAM(s) Oral daily  pantoprazole    Tablet 40 milliGRAM(s) Oral before breakfast  senna 2 Tablet(s) Oral at bedtime    MEDICATIONS  (PRN):  acetaminophen     Tablet .. 650 milliGRAM(s) Oral every 4 hours PRN Temp greater or equal to 38C (100.4F), Mild Pain (1 - 3)  ALBUTerol    90 MICROgram(s) HFA Inhaler 1 Puff(s) Inhalation every 6 hours PRN Shortness of Breath and/or Wheezing  cyclobenzaprine 10 milliGRAM(s) Oral three times a day PRN Muscle Spasm  oxyCODONE    IR 10 milliGRAM(s) Oral every 4 hours PRN Severe Pain (7 - 10)  oxyCODONE    IR 5 milliGRAM(s) Oral every 4 hours PRN Moderate Pain (4 - 6)

## 2021-11-18 NOTE — DIETITIAN INITIAL EVALUATION ADULT. - PERTINENT LABORATORY DATA
Date: 18 Nov 2021 06:09  Hemoglobin 10.6   Hematocrit 33.5     Date: 11-18  Sodium 135  Potassium 4.7  Glucose Serum 113<H>  BUN 19      Creatinine 0.89    ACCUCHECK

## 2021-11-18 NOTE — DIETITIAN INITIAL EVALUATION ADULT. - ORAL INTAKE PTA/DIET HISTORY
Pt reports good appetite PTA. Cooks most of her own meals at home, avoids all added salt. Confirms allergy to shellfish, regular fish ok. No chewing/swallowing difficulty.

## 2021-11-18 NOTE — DIETITIAN INITIAL EVALUATION ADULT. - PERTINENT MEDS FT
MEDICATIONS  (STANDING):  aspirin  chewable 81 milliGRAM(s) Oral daily  bisacodyl 5 milliGRAM(s) Oral at bedtime  budesonide  80 MICROgram(s)/formoterol 4.5 MICROgram(s) Inhaler 2 Puff(s) Inhalation two times a day  budesonide  80 MICROgram(s)/formoterol 4.5 MICROgram(s) Inhaler 2 Puff(s) Inhalation two times a day  enoxaparin Injectable 30 milliGRAM(s) SubCutaneous daily  metoprolol succinate ER 75 milliGRAM(s) Oral daily  pantoprazole    Tablet 40 milliGRAM(s) Oral before breakfast  senna 2 Tablet(s) Oral at bedtime    MEDICATIONS  (PRN):  acetaminophen     Tablet .. 650 milliGRAM(s) Oral every 4 hours PRN Temp greater or equal to 38C (100.4F), Mild Pain (1 - 3)  ALBUTerol    90 MICROgram(s) HFA Inhaler 1 Puff(s) Inhalation every 6 hours PRN Shortness of Breath and/or Wheezing  cyclobenzaprine 10 milliGRAM(s) Oral three times a day PRN Muscle Spasm  oxyCODONE    IR 10 milliGRAM(s) Oral every 4 hours PRN Severe Pain (7 - 10)  oxyCODONE    IR 5 milliGRAM(s) Oral every 4 hours PRN Moderate Pain (4 - 6)

## 2021-11-19 LAB — SARS-COV-2 RNA SPEC QL NAA+PROBE: SIGNIFICANT CHANGE UP

## 2021-11-19 PROCEDURE — 99233 SBSQ HOSP IP/OBS HIGH 50: CPT

## 2021-11-19 RX ADMIN — Medication 50 MILLIGRAM(S): at 06:31

## 2021-11-19 RX ADMIN — Medication 81 MILLIGRAM(S): at 11:35

## 2021-11-19 RX ADMIN — ALBUTEROL 1 PUFF(S): 90 AEROSOL, METERED ORAL at 08:05

## 2021-11-19 RX ADMIN — ENOXAPARIN SODIUM 30 MILLIGRAM(S): 100 INJECTION SUBCUTANEOUS at 11:37

## 2021-11-19 RX ADMIN — PANTOPRAZOLE SODIUM 40 MILLIGRAM(S): 20 TABLET, DELAYED RELEASE ORAL at 06:31

## 2021-11-19 RX ADMIN — BUDESONIDE AND FORMOTEROL FUMARATE DIHYDRATE 2 PUFF(S): 160; 4.5 AEROSOL RESPIRATORY (INHALATION) at 21:28

## 2021-11-19 RX ADMIN — Medication 650 MILLIGRAM(S): at 17:20

## 2021-11-19 RX ADMIN — Medication 650 MILLIGRAM(S): at 11:35

## 2021-11-19 RX ADMIN — Medication 650 MILLIGRAM(S): at 11:52

## 2021-11-19 RX ADMIN — BUDESONIDE AND FORMOTEROL FUMARATE DIHYDRATE 2 PUFF(S): 160; 4.5 AEROSOL RESPIRATORY (INHALATION) at 08:02

## 2021-11-19 NOTE — PROGRESS NOTE ADULT - ASSESSMENT
59 y/o female with PMH of HTN, hyperlipidemia, chronic CHF, CAD s/p MI 2010, TIA s/p loop recorder placement 2018, left BBB, asthma,  c/o both leg pain associated with paresthesia and difficulty walking, she presents to [facility] on 11/11 for elective T10-T12 decompression/fusion with Dr. Salazar. Patient now with gait Instability, ADL impairments and Functional impairments.    * Dressing change, surgical wound lower back and wound care consult for blister on lower back  * Hypotension, lisinopril on hold, toprolol dose reduced to 50mg daily    # Spinal stenosis  - s/p T10-12 decompression, T10-11 posterior fusion on 11/11/2021 with Dr. Salazar  - Start Comprehensive Rehab Program: PT/OT/ST  - PT: Focused on improving strength, endurance, coordination, balance, functional mobility, and transfers  - OT: Focused on improving strength, fine motor skills, coordination, posture and ADLs.    - Remove staples/sutures post-op day #15 (11/26/2021)    #HTN  - Metoprolol succinate 75mg daily reduced to 50mg on 11/18/21 due to hypotension  - Lisinopril 10mg daily--held from 11/18/21 due to low BP    #CAD  - s/p MI in 2010  - TIA s/p loop recorder in 2018  - c/w ASA daily    #Asthma  - Symbicort  BID  - Albuterol     #Pain management  - Tylenol PRN, Oxycodone PRN, flexeril PRN    #DVT ppx  - Lovenox, SCD, TEDs    #Bowel Regimen  - Senna, dulcolax    #Bladder management  - BS on admission, and q 8 hours (SC if > 400)  - Monitor UO    #FEN   - Diet: Regular    #Precaution  - Fall, Spinal     #Skin:  - Blister to lower back - covered w/ alyvene only, currently.  - wound care consult for recs d/t proximity to area of incision  - Pressure injury/Skin:  OOB to Chair, PT/OT     #Sleep:   - Maintain quiet hours and low stim environment.  - Melatonin PRN to maximize participation in therapy during the day.   - Monitor sleep logs    Outpatient Follow-up (Specialty/Name of physician):    Ben Salazar)  [facility]  [address], [place], [address]  [place] [address]  Phone: [phone]  Fax: [phone]    F/U PCP    Summary  59 y/o F, Admitted to acute rehab treatment 11/17/21 after 10-T12 decompression/fusion on 11/11/21 at [facility] for severe spinal stenosis with LE radicular symptoms and urge incontinence,  Has hx of  HTN, hyperlipidemia, chronic CHF, CAD s/p MI 2010, TIA s/p loop recorder placement 2018, left BBB and, asthma,    Hypotension being monitored---lisinopril help, Toprolol dose reduced  Wound care for left lower back blister near surgical wound  Dressing change for surgical wound  Stable, BP and HR stabilized    continue therapy as tolerated,   Patient stable to continue acute rehab treatment, while monitoring BP and HR

## 2021-11-19 NOTE — PROGRESS NOTE ADULT - SUBJECTIVE AND OBJECTIVE BOX
HPI:  This is a 61 y/o female with PMH of HTN, hyperlipidemia, chronic CHF, CAD s/p MI 2010, TIA s/p loop recorder placement 2018, left BBB, asthma,  c/o both leg pain associated with paresthesia and difficulty walking, she presents to [facility] on 11/11 for elective T10-T12 decompression/fusion. she underwent T10-12 decompression, T10-11 fusion on 11/11/2021 with Dr. Salazar.  Patient tolerated procedure well, no post-op complications. She was evaluated postoperatively by physical and occupational therapists and acute rehab was recommended.     (17 Nov 2021 14:58)      INTERVAL HPI/OVERNIGHT EVENTS:  Chart Reviewed and patient seen/examined at bedside.  Back pain controlled  Hypotensive during therapy yesterday, lisinopril help, subsequently Toprolol dose reduced due to further hypotension last night  Now stable except for high normal HR  Surgical dressing soaked, dressing will be changed, await wound care for blister beside lumbar surgical wound  Had normal bowel movement  Otherwise no complaints or issues    ROS:  Denies fevers, chills, chest pain, shortness of breath, abdominal pain, headaches, nausea/vomiting. blister on lower back, soaked surgical wound    Vital Signs Last 24 Hrs  Vital Signs Last 24 Hrs  T(C): 36.9 (19 Nov 2021 08:24), Max: 37.1 (18 Nov 2021 15:02)  T(F): 98.4 (19 Nov 2021 08:24), Max: 98.7 (18 Nov 2021 15:02)  HR: 101 (19 Nov 2021 08:24) (95 - 102)  BP: 104/66 (19 Nov 2021 08:24) (89/57 - 109/68)  RR: 16 (19 Nov 2021 08:24) (15 - 16)  SpO2: 98% (19 Nov 2021 08:24) (96% - 99%)      Physical Exam:  Gen - NAD, Comfortable  HEENT - NCAT, EOMI, MMM  Neck - Supple, No limited ROM  Pulm - CTAB, No wheeze, No rhonchi, No crackles  Cardiovascular - RRR, S1S2, No murmurs  Abdomen - Soft, NT/ND, +BS  Extremities -no leg edema  Skin--surgical dressing soaked, non bloody, blister on lower back right side of surgical wound    Neuro-  Fully oriented  Motor function UE 5/5  RLE Hip 3/5 knee/ankle 4/5  LLE Hip 2/5, Knee/ankle 3/5  Sensory - Intact to LT, decreased to left shin  Reflexes - DTR Intact, No primitive reflexive  Coordination - FTN intact  Tone - normal  Psychiatric - Mood stable, Affect WNL  Skin: Lumbar spine incision with dressing, fluid filled blister x 1 around incisional dressing, skin tear to left gluteal fold    LABS: 11-18    135  |  101  |  19  ----------------------------<  113<H>  4.7   |  29  |  0.89  11-17    136  |  101  |  20  ----------------------------<  105<H>  5.0   |  26  |  0.87    Ca    8.8      18 Nov 2021 06:09  Ca    9.2      17 Nov 2021 07:17    TPro  6.2  /  Alb  2.8<L>  /  TBili  0.7  /  DBili  x   /  AST  20  /  ALT  24  /  AlkPhos  62  11-18                          10.6   8.49  )-----------( 223      ( 18 Nov 2021 06:09 )             33.5                         10.9   8.94  )-----------( 243      ( 17 Nov 2021 07:25 )             35.2     CAPILLARY BLOOD GLUCOSE      MEDICATIONS  (STANDING):  aspirin  chewable 81 milliGRAM(s) Oral daily  bisacodyl 5 milliGRAM(s) Oral at bedtime  budesonide  80 MICROgram(s)/formoterol 4.5 MICROgram(s) Inhaler 2 Puff(s) Inhalation two times a day  enoxaparin Injectable 30 milliGRAM(s) SubCutaneous daily  metoprolol succinate ER 50 milliGRAM(s) Oral daily  pantoprazole    Tablet 40 milliGRAM(s) Oral before breakfast  senna 2 Tablet(s) Oral at bedtime    MEDICATIONS  (PRN):  acetaminophen     Tablet .. 650 milliGRAM(s) Oral every 4 hours PRN Temp greater or equal to 38C (100.4F), Mild Pain (1 - 3)  ALBUTerol    90 MICROgram(s) HFA Inhaler 1 Puff(s) Inhalation every 6 hours PRN Shortness of Breath and/or Wheezing  methocarbamol 500 milliGRAM(s) Oral three times a day PRN Muscle Spasm  oxyCODONE    IR 10 milliGRAM(s) Oral every 4 hours PRN Severe Pain (7 - 10)  oxyCODONE    IR 5 milliGRAM(s) Oral every 4 hours PRN Moderate Pain (4 - 6)    Therapy; Engaging   ambulating with RW, minimal distance

## 2021-11-19 NOTE — ADVANCED PRACTICE NURSE CONSULT - ASSESSMENT
Patient admitted s/p spinal surgery with intact steri strips to Thoracic incision.  Proximal & inferiorly to right of incision is an intact serous filled blister.  Blister is circular, approx 2.5 cm, raised 1 cm above level of skin.  Periwound is intact, no warmth, edema or erythema.

## 2021-11-19 NOTE — PROGRESS NOTE ADULT - SUBJECTIVE AND OBJECTIVE BOX
61 y/o female with PMH of HTN, hyperlipidemia, chronic CHF, CAD s/p MI 2010, TIA s/p loop recorder placement 2018, left BBB, asthma,  c/o both leg pain associated with paresthesia and difficulty walking, she presents to [facility] on 11/44 for elective T10-T12 decompression/fusion. she underwent T10-12 decompression, T10-11 fusion on 11/11/2021 with Dr. Salazar.  Patient tolerated procedure well, no post-op complications.     seen at the bedside, for medical consultation, c/o intermittent back pain,  aggravated by movements, and back incision wound, no n/v, no sob, voiding    Vital Signs Last 24 Hrs  T(C): 36.9 (19 Nov 2021 08:24), Max: 37.1 (18 Nov 2021 15:02)  T(F): 98.4 (19 Nov 2021 08:24), Max: 98.7 (18 Nov 2021 15:02)  HR: 101 (19 Nov 2021 08:24) (95 - 102)  BP: 104/66 (19 Nov 2021 08:24) (89/57 - 109/68)  BP(mean): --  RR: 16 (19 Nov 2021 08:24) (15 - 16)  SpO2: 98% (19 Nov 2021 08:24) (96% - 99%)      GENERAL- NAD  EAR/NOSE/MOUTH/THROAT - no pharyngeal exudates, no oral lesions  MMM  EYES- LUCIAN, conjunctiva and Sclera clear  NECK- supple  RESPIRATORY-  clear to auscultation bilaterally, non laboured breathing  CARDIOVASCULAR - SIS2, RRR  GI - soft NT BS present  EXTREMITIES- no pedal edema  NEUROLOGY- LLE weakness  SKIN- no rashes, warm to touch  PSYCHIATRY- AAO X 3  MUSCULOSKELETAL- AROM diminished to LLE                    10.6                 135  | 29   | 19           8.49  >-----------< 223     ------------------------< 113                   33.5                 4.7  | 101  | 0.89                                         Ca 8.8   Mg x     Ph x             MEDICATIONS  (STANDING):  aspirin  chewable 81 milliGRAM(s) Oral daily  bisacodyl 5 milliGRAM(s) Oral at bedtime  budesonide  80 MICROgram(s)/formoterol 4.5 MICROgram(s) Inhaler 2 Puff(s) Inhalation two times a day  enoxaparin Injectable 30 milliGRAM(s) SubCutaneous daily  metoprolol succinate ER 50 milliGRAM(s) Oral daily  pantoprazole    Tablet 40 milliGRAM(s) Oral before breakfast  senna 2 Tablet(s) Oral at bedtime    MEDICATIONS  (PRN):  acetaminophen     Tablet .. 650 milliGRAM(s) Oral every 4 hours PRN Temp greater or equal to 38C (100.4F), Mild Pain (1 - 3)  ALBUTerol    90 MICROgram(s) HFA Inhaler 1 Puff(s) Inhalation every 6 hours PRN Shortness of Breath and/or Wheezing  methocarbamol 500 milliGRAM(s) Oral three times a day PRN Muscle Spasm  oxyCODONE    IR 10 milliGRAM(s) Oral every 4 hours PRN Severe Pain (7 - 10)  oxyCODONE    IR 5 milliGRAM(s) Oral every 4 hours PRN Moderate Pain (4 - 6)

## 2021-11-19 NOTE — PROGRESS NOTE ADULT - ASSESSMENT
61 y/o female with PMH of HTN, hyperlipidemia, chronic CHF, CAD s/p MI 2010, TIA s/p loop recorder placement 2018, left BBB, asthma,  c/o both leg pain associated with paresthesia and difficulty walking, she presents to [facility] on 11/11 for elective T10-T12 decompression/fusion with Dr. Salazar. Patient now with gait Instability, ADL impairments and Functional impairments- pt/ot/dvt ppx, pain meds    # Spinal stenosis/ back incisional wound  - s/p T10-12 decompression, T10-11 fusion on 11/11/2021 with Dr. Salazar  - pain meds, dvt ppx  - wound care per wound care team    #HTN- hypotension  - Metoprolol succinate decreased form 75 to 50mg 11/18/21, noted to be slightly tachy could be due to debility, but no c/o palpitations, no fever, will monitor.   -  Lisinopril 10mg stopped 11/18/21 due to low BP  - Transthoracic Echocardiogram (11.20.18 @ 22:16)   - Ejection Fraction (Modified Wright Rule): 51 %  - Low normal left ventricular systolic function.     #CAD  - s/p MI in 2010  - TIA s/p loop recorder in 2018  - c/w ASA daily    #Asthma  - Symbicort  BID  - Albuterol     #Pain management  - Tylenol PRN, Oxycodone PRN, flexeril PRN    #DVT ppx  - Lovenox    will follow  d/w dr. clifford

## 2021-11-20 PROCEDURE — 99232 SBSQ HOSP IP/OBS MODERATE 35: CPT

## 2021-11-20 RX ADMIN — Medication 81 MILLIGRAM(S): at 11:12

## 2021-11-20 RX ADMIN — PANTOPRAZOLE SODIUM 40 MILLIGRAM(S): 20 TABLET, DELAYED RELEASE ORAL at 06:07

## 2021-11-20 RX ADMIN — BUDESONIDE AND FORMOTEROL FUMARATE DIHYDRATE 2 PUFF(S): 160; 4.5 AEROSOL RESPIRATORY (INHALATION) at 10:04

## 2021-11-20 RX ADMIN — BUDESONIDE AND FORMOTEROL FUMARATE DIHYDRATE 2 PUFF(S): 160; 4.5 AEROSOL RESPIRATORY (INHALATION) at 21:47

## 2021-11-20 RX ADMIN — ENOXAPARIN SODIUM 30 MILLIGRAM(S): 100 INJECTION SUBCUTANEOUS at 11:12

## 2021-11-20 NOTE — PROGRESS NOTE ADULT - ASSESSMENT
Spinal stenosis /p T10-12 decompression, T10-11 fusion on 11/11/2021  PT/OT per rehab  Pain control per rehab    HTN  Metoprolol dec from 75-50 on 11/18.   Monitor for now    CAD  ASA/Metoprolol    Asthma  Symbicort, prn alb    DVT ppx  Lovenox

## 2021-11-20 NOTE — PROGRESS NOTE ADULT - SUBJECTIVE AND OBJECTIVE BOX
CC: Patient admitted to rehab s/p T10-T12 decompression and T10-T11 fusion for LBP, spondylosis    TODAY'S SUBJECTIVE & REVIEW OF SYMPTOMS  Patient seen at bedside  Slept OK  Pain improved on current regimen  Concerned about blister on back and abrasion on buttock     Denies HA/CP/palpitations  + BM   denies urinary incontinence  Denies any dizziness      PHYSICAL EXAM    Vital Signs Last 24 Hrs  T(C): 36.9 (20 Nov 2021 10:35), Max: 36.9 (19 Nov 2021 20:29)  T(F): 98.5 (20 Nov 2021 10:35), Max: 98.5 (20 Nov 2021 10:35)  HR: 95 (20 Nov 2021 10:35) (92 - 97)  BP: 112/68 (20 Nov 2021 10:35) (98/62 - 112/68)  BP(mean): --  RR: 15 (20 Nov 2021 10:35) (15 - 16)  SpO2: 99% (20 Nov 2021 10:35) (96% - 99%)    Constitutional - NAD, Comfortable  Chest - breathing comfortably  Cardiovascular -S1S2  Abdomen -Soft NT   Extremities - No C/C/E, No calf tenderness   Back: incision clean, steristrips +, Intact fluid filled single blister below incision - allewyn dressing +, buttock area - small area of denuded skin from prior scabs        Patient ambulated to bathroom with walker with CG assistance from RN       MEDICATIONS  (STANDING):  aspirin  chewable 81 milliGRAM(s) Oral daily  bisacodyl 5 milliGRAM(s) Oral at bedtime  budesonide  80 MICROgram(s)/formoterol 4.5 MICROgram(s) Inhaler 2 Puff(s) Inhalation two times a day  enoxaparin Injectable 30 milliGRAM(s) SubCutaneous daily  metoprolol succinate ER 50 milliGRAM(s) Oral daily  pantoprazole    Tablet 40 milliGRAM(s) Oral before breakfast  senna 2 Tablet(s) Oral at bedtime    MEDICATIONS  (PRN):  acetaminophen     Tablet .. 650 milliGRAM(s) Oral every 4 hours PRN Temp greater or equal to 38C (100.4F), Mild Pain (1 - 3)  ALBUTerol    90 MICROgram(s) HFA Inhaler 1 Puff(s) Inhalation every 6 hours PRN Shortness of Breath and/or Wheezing  methocarbamol 500 milliGRAM(s) Oral three times a day PRN Muscle Spasm  oxyCODONE    IR 10 milliGRAM(s) Oral every 4 hours PRN Severe Pain (7 - 10)  oxyCODONE    IR 5 milliGRAM(s) Oral every 4 hours PRN Moderate Pain (4 - 6)

## 2021-11-20 NOTE — PROGRESS NOTE ADULT - SUBJECTIVE AND OBJECTIVE BOX
HPI:  This is a 59 y/o female with PMH of HTN, hyperlipidemia, chronic CHF, CAD s/p MI , TIA s/p loop recorder placement 2018, left BBB, asthma,  c/o both leg pain associated with paresthesia and difficulty walking, she presents to [facility] on  for elective T10-T12 decompression/fusion. she underwent T10-12 decompression, T10-11 fusion on 2021 with Dr. Salazar.  Patient tolerated procedure well, no post-op complications. She was evaluated postoperatively by physical and occupational therapists and acute rehab was recommended.       (2021 14:58)      Subjective    no new complaints        PAST MEDICAL & SURGICAL HISTORY:  Asthma    LBBB (left bundle branch block)    CHF (congestive heart failure)  chronic    Myocardial infarction, unspecified MI type, unspecified artery      Idiopathic neuropathy    Hypertension    Hyperlipidemia    Migraines    TIA (transient ischemic attack)   s/p loop recorder    History of tubal ligation    History of     S/P lumpectomy, left breast    History of cataract surgery        MedsMEDICATIONS  (STANDING):  aspirin  chewable 81 milliGRAM(s) Oral daily  bisacodyl 5 milliGRAM(s) Oral at bedtime  budesonide  80 MICROgram(s)/formoterol 4.5 MICROgram(s) Inhaler 2 Puff(s) Inhalation two times a day  enoxaparin Injectable 30 milliGRAM(s) SubCutaneous daily  metoprolol succinate ER 50 milliGRAM(s) Oral daily  pantoprazole    Tablet 40 milliGRAM(s) Oral before breakfast  senna 2 Tablet(s) Oral at bedtime    MEDICATIONS  (PRN):  acetaminophen     Tablet .. 650 milliGRAM(s) Oral every 4 hours PRN Temp greater or equal to 38C (100.4F), Mild Pain (1 - 3)  ALBUTerol    90 MICROgram(s) HFA Inhaler 1 Puff(s) Inhalation every 6 hours PRN Shortness of Breath and/or Wheezing  methocarbamol 500 milliGRAM(s) Oral three times a day PRN Muscle Spasm  oxyCODONE    IR 10 milliGRAM(s) Oral every 4 hours PRN Severe Pain (7 - 10)  oxyCODONE    IR 5 milliGRAM(s) Oral every 4 hours PRN Moderate Pain (4 - 6)      Vital Signs Last 24 Hrs  T(C): 36.9 (2021 10:35), Max: 36.9 (2021 20:29)  T(F): 98.5 (2021 10:35), Max: 98.5 (2021 10:35)  HR: 95 (2021 10:35) (92 - 97)  BP: 112/68 (2021 10:35) (98/62 - 112/68)  BP(mean): --  RR: 15 (2021 10:35) (15 - 16)  SpO2: 99% (2021 10:35) (96% - 99%)  I&O's Summary      PHYSICAL EXAM:  GENERAL: NAD  NECK: Supple  NERVOUS SYSTEM:  awake and alert  HEART: S1s2 NL , RRR  CHEST/LUNG: Clear to percussion bilaterally  ABDOMEN: Soft, Nontender, Nondistended; Bowel sounds present  EXTREMITIES:  No edema      LABS:              RVP:          Tox:           CAPILLARY BLOOD GLUCOSE          Imaging Personally Reviewed:  [ ] YES  [ ] NO        Care Discussed with Consultants/Other Providers [ x] YES  [ ] NO

## 2021-11-20 NOTE — PROGRESS NOTE ADULT - ASSESSMENT
60 year old female with h/o LBP, spondylosis, now s/p T10-T12 decompression and t10-t11 fusion    Continue PT/OT total of 3 hrs/day 5 days/week    DVT prophylaxis: on Lovenox    H/O HTN: hypotension while in rehab and medications have been adjusted. Now only on metoprolol succinate daily . Monitor and titrate as needed    Pain management: on tylenol and oxycodone prn and methocarbamol prn

## 2021-11-21 PROCEDURE — 99232 SBSQ HOSP IP/OBS MODERATE 35: CPT

## 2021-11-21 RX ADMIN — ENOXAPARIN SODIUM 30 MILLIGRAM(S): 100 INJECTION SUBCUTANEOUS at 11:14

## 2021-11-21 RX ADMIN — Medication 50 MILLIGRAM(S): at 05:52

## 2021-11-21 RX ADMIN — Medication 650 MILLIGRAM(S): at 18:38

## 2021-11-21 RX ADMIN — OXYCODONE HYDROCHLORIDE 10 MILLIGRAM(S): 5 TABLET ORAL at 21:07

## 2021-11-21 RX ADMIN — BUDESONIDE AND FORMOTEROL FUMARATE DIHYDRATE 2 PUFF(S): 160; 4.5 AEROSOL RESPIRATORY (INHALATION) at 09:08

## 2021-11-21 RX ADMIN — Medication 81 MILLIGRAM(S): at 11:14

## 2021-11-21 RX ADMIN — Medication 5 MILLIGRAM(S): at 21:06

## 2021-11-21 RX ADMIN — PANTOPRAZOLE SODIUM 40 MILLIGRAM(S): 20 TABLET, DELAYED RELEASE ORAL at 05:52

## 2021-11-21 RX ADMIN — BUDESONIDE AND FORMOTEROL FUMARATE DIHYDRATE 2 PUFF(S): 160; 4.5 AEROSOL RESPIRATORY (INHALATION) at 21:23

## 2021-11-21 RX ADMIN — OXYCODONE HYDROCHLORIDE 10 MILLIGRAM(S): 5 TABLET ORAL at 06:23

## 2021-11-21 RX ADMIN — OXYCODONE HYDROCHLORIDE 10 MILLIGRAM(S): 5 TABLET ORAL at 21:37

## 2021-11-21 RX ADMIN — SENNA PLUS 2 TABLET(S): 8.6 TABLET ORAL at 21:06

## 2021-11-21 RX ADMIN — OXYCODONE HYDROCHLORIDE 10 MILLIGRAM(S): 5 TABLET ORAL at 05:52

## 2021-11-21 NOTE — PROGRESS NOTE ADULT - SUBJECTIVE AND OBJECTIVE BOX
HPI:  This is a 61 y/o female with PMH of HTN, hyperlipidemia, chronic CHF, CAD s/p MI , TIA s/p loop recorder placement 2018, left BBB, asthma,  c/o both leg pain associated with paresthesia and difficulty walking, she presents to [facility] on  for elective T10-T12 decompression/fusion. she underwent T10-12 decompression, T10-11 fusion on 2021 with Dr. Salazar.  Patient tolerated procedure well, no post-op complications. She was evaluated postoperatively by physical and occupational therapists and acute rehab was recommended.       (2021 14:58)      Subjective    No new complaints.       PAST MEDICAL & SURGICAL HISTORY:  Asthma    LBBB (left bundle branch block)    CHF (congestive heart failure)  chronic    Myocardial infarction, unspecified MI type, unspecified artery      Idiopathic neuropathy    Hypertension    Hyperlipidemia    Migraines    TIA (transient ischemic attack)   s/p loop recorder    History of tubal ligation    History of     S/P lumpectomy, left breast    History of cataract surgery        MedsMEDICATIONS  (STANDING):  aspirin  chewable 81 milliGRAM(s) Oral daily  bisacodyl 5 milliGRAM(s) Oral at bedtime  budesonide  80 MICROgram(s)/formoterol 4.5 MICROgram(s) Inhaler 2 Puff(s) Inhalation two times a day  enoxaparin Injectable 30 milliGRAM(s) SubCutaneous daily  metoprolol succinate ER 50 milliGRAM(s) Oral daily  pantoprazole    Tablet 40 milliGRAM(s) Oral before breakfast  senna 2 Tablet(s) Oral at bedtime    MEDICATIONS  (PRN):  acetaminophen     Tablet .. 650 milliGRAM(s) Oral every 4 hours PRN Temp greater or equal to 38C (100.4F), Mild Pain (1 - 3)  ALBUTerol    90 MICROgram(s) HFA Inhaler 1 Puff(s) Inhalation every 6 hours PRN Shortness of Breath and/or Wheezing  methocarbamol 500 milliGRAM(s) Oral three times a day PRN Muscle Spasm  oxyCODONE    IR 10 milliGRAM(s) Oral every 4 hours PRN Severe Pain (7 - 10)  oxyCODONE    IR 5 milliGRAM(s) Oral every 4 hours PRN Moderate Pain (4 - 6)      Vital Signs Last 24 Hrs  T(C): 36.7 (2021 09:46), Max: 36.8 (2021 21:54)  T(F): 98.1 (2021 09:46), Max: 98.2 (2021 21:54)  HR: 80 (2021 09:46) (80 - 98)  BP: 107/67 (2021 09:46) (95/59 - 107/67)  BP(mean): --  RR: 16 (2021 09:46) (16 - 16)  SpO2: 97% (2021 09:46) (97% - 97%)  I&O's Summary      PHYSICAL EXAM:  GENERAL: NAD  NECK: Supple  NERVOUS SYSTEM:  awake and alert  HEART: S1s2 NL , RRR  CHEST/LUNG: Clear to percussion bilaterally  ABDOMEN: Soft, Nontender, Nondistended; Bowel sounds present  EXTREMITIES:  No edema      LABS:              RVP:          Tox:           CAPILLARY BLOOD GLUCOSE          Imaging Personally Reviewed:  [ ] YES  [ ] NO        Care Discussed with Consultants/Other Providers [ x] YES  [ ] NO

## 2021-11-21 NOTE — PROGRESS NOTE ADULT - ASSESSMENT
Spinal stenosis /p T10-12 decompression, T10-11 fusion on 11/11/2021  PT/OT per rehab  Pain control per rehab    HTN  Metoprolol dec from 75-50 on 11/18.   Monitor for now    CAD  ASA/Metoprolol    Asthma  Symbicort, prn alb    Post-op anemia  h/h stable  Monitor for now    DVT ppx  Lovenox

## 2021-11-21 NOTE — PROGRESS NOTE ADULT - SUBJECTIVE AND OBJECTIVE BOX
CC: Patient admitted to rehab s/p T10-T12 decompression and T10-T11 fusion for LBP, spondylosis    TODAY'S SUBJECTIVE & REVIEW OF SYMPTOMS  Patient seen at bedside  Slept well after taking pain meds    Denies HA/CP/palpitations  + BM   denies urinary incontinence  Denies any dizziness      PHYSICAL EXAM    Vital Signs Last 24 Hrs  T(C): 36.7 (21 Nov 2021 09:46), Max: 36.8 (20 Nov 2021 21:54)  T(F): 98.1 (21 Nov 2021 09:46), Max: 98.2 (20 Nov 2021 21:54)  HR: 80 (21 Nov 2021 09:46) (80 - 98)  BP: 107/67 (21 Nov 2021 09:46) (95/59 - 107/67)  BP(mean): --  RR: 16 (21 Nov 2021 09:46) (16 - 16)  SpO2: 97% (21 Nov 2021 09:46) (97% - 97%)    Constitutional - NAD, Comfortable  Chest - breathing comfortably  Cardiovascular -S1S2  Abdomen -Soft NT   Extremities - No C/C/E, No calf tenderness   Back: incision clean, steristrips +, blister opened today - allewyn dressing +, buttock area - small area of denuded skin from prior scabs                MEDICATIONS  (STANDING):  aspirin  chewable 81 milliGRAM(s) Oral daily  bisacodyl 5 milliGRAM(s) Oral at bedtime  budesonide  80 MICROgram(s)/formoterol 4.5 MICROgram(s) Inhaler 2 Puff(s) Inhalation two times a day  enoxaparin Injectable 30 milliGRAM(s) SubCutaneous daily  metoprolol succinate ER 50 milliGRAM(s) Oral daily  pantoprazole    Tablet 40 milliGRAM(s) Oral before breakfast  senna 2 Tablet(s) Oral at bedtime    MEDICATIONS  (PRN):  acetaminophen     Tablet .. 650 milliGRAM(s) Oral every 4 hours PRN Temp greater or equal to 38C (100.4F), Mild Pain (1 - 3)  ALBUTerol    90 MICROgram(s) HFA Inhaler 1 Puff(s) Inhalation every 6 hours PRN Shortness of Breath and/or Wheezing  methocarbamol 500 milliGRAM(s) Oral three times a day PRN Muscle Spasm  oxyCODONE    IR 10 milliGRAM(s) Oral every 4 hours PRN Severe Pain (7 - 10)  oxyCODONE    IR 5 milliGRAM(s) Oral every 4 hours PRN Moderate Pain (4 - 6)

## 2021-11-22 LAB
ALBUMIN SERPL ELPH-MCNC: 2.8 G/DL — LOW (ref 3.3–5)
ALP SERPL-CCNC: 70 U/L — SIGNIFICANT CHANGE UP (ref 40–120)
ALT FLD-CCNC: 20 U/L — SIGNIFICANT CHANGE UP (ref 10–45)
ANION GAP SERPL CALC-SCNC: 7 MMOL/L — SIGNIFICANT CHANGE UP (ref 5–17)
AST SERPL-CCNC: 21 U/L — SIGNIFICANT CHANGE UP (ref 10–40)
BASOPHILS # BLD AUTO: 0.07 K/UL — SIGNIFICANT CHANGE UP (ref 0–0.2)
BASOPHILS NFR BLD AUTO: 1 % — SIGNIFICANT CHANGE UP (ref 0–2)
BILIRUB SERPL-MCNC: 0.4 MG/DL — SIGNIFICANT CHANGE UP (ref 0.2–1.2)
BUN SERPL-MCNC: 21 MG/DL — SIGNIFICANT CHANGE UP (ref 7–23)
CALCIUM SERPL-MCNC: 8.8 MG/DL — SIGNIFICANT CHANGE UP (ref 8.4–10.5)
CHLORIDE SERPL-SCNC: 106 MMOL/L — SIGNIFICANT CHANGE UP (ref 96–108)
CO2 SERPL-SCNC: 28 MMOL/L — SIGNIFICANT CHANGE UP (ref 22–31)
CREAT SERPL-MCNC: 0.78 MG/DL — SIGNIFICANT CHANGE UP (ref 0.5–1.3)
EOSINOPHIL # BLD AUTO: 0.09 K/UL — SIGNIFICANT CHANGE UP (ref 0–0.5)
EOSINOPHIL NFR BLD AUTO: 1.3 % — SIGNIFICANT CHANGE UP (ref 0–6)
GLUCOSE SERPL-MCNC: 99 MG/DL — SIGNIFICANT CHANGE UP (ref 70–99)
HCT VFR BLD CALC: 30 % — LOW (ref 34.5–45)
HGB BLD-MCNC: 9.5 G/DL — LOW (ref 11.5–15.5)
IMM GRANULOCYTES NFR BLD AUTO: 0.4 % — SIGNIFICANT CHANGE UP (ref 0–1.5)
LYMPHOCYTES # BLD AUTO: 2.35 K/UL — SIGNIFICANT CHANGE UP (ref 1–3.3)
LYMPHOCYTES # BLD AUTO: 33.4 % — SIGNIFICANT CHANGE UP (ref 13–44)
MCHC RBC-ENTMCNC: 28.4 PG — SIGNIFICANT CHANGE UP (ref 27–34)
MCHC RBC-ENTMCNC: 31.7 GM/DL — LOW (ref 32–36)
MCV RBC AUTO: 89.6 FL — SIGNIFICANT CHANGE UP (ref 80–100)
MONOCYTES # BLD AUTO: 0.74 K/UL — SIGNIFICANT CHANGE UP (ref 0–0.9)
MONOCYTES NFR BLD AUTO: 10.5 % — SIGNIFICANT CHANGE UP (ref 2–14)
NEUTROPHILS # BLD AUTO: 3.76 K/UL — SIGNIFICANT CHANGE UP (ref 1.8–7.4)
NEUTROPHILS NFR BLD AUTO: 53.4 % — SIGNIFICANT CHANGE UP (ref 43–77)
NRBC # BLD: 0 /100 WBCS — SIGNIFICANT CHANGE UP (ref 0–0)
PLATELET # BLD AUTO: 253 K/UL — SIGNIFICANT CHANGE UP (ref 150–400)
POTASSIUM SERPL-MCNC: 4.2 MMOL/L — SIGNIFICANT CHANGE UP (ref 3.5–5.3)
POTASSIUM SERPL-SCNC: 4.2 MMOL/L — SIGNIFICANT CHANGE UP (ref 3.5–5.3)
PROT SERPL-MCNC: 6.5 G/DL — SIGNIFICANT CHANGE UP (ref 6–8.3)
RBC # BLD: 3.35 M/UL — LOW (ref 3.8–5.2)
RBC # FLD: 12.9 % — SIGNIFICANT CHANGE UP (ref 10.3–14.5)
SODIUM SERPL-SCNC: 141 MMOL/L — SIGNIFICANT CHANGE UP (ref 135–145)
WBC # BLD: 7.04 K/UL — SIGNIFICANT CHANGE UP (ref 3.8–10.5)
WBC # FLD AUTO: 7.04 K/UL — SIGNIFICANT CHANGE UP (ref 3.8–10.5)

## 2021-11-22 PROCEDURE — 99232 SBSQ HOSP IP/OBS MODERATE 35: CPT

## 2021-11-22 RX ORDER — MUPIROCIN 20 MG/G
1 OINTMENT TOPICAL
Refills: 0 | Status: DISCONTINUED | OUTPATIENT
Start: 2021-11-22 | End: 2021-11-24

## 2021-11-22 RX ORDER — OXYCODONE HYDROCHLORIDE 5 MG/1
10 TABLET ORAL EVERY 4 HOURS
Refills: 0 | Status: DISCONTINUED | OUTPATIENT
Start: 2021-11-22 | End: 2021-11-24

## 2021-11-22 RX ORDER — OXYCODONE HYDROCHLORIDE 5 MG/1
5 TABLET ORAL EVERY 4 HOURS
Refills: 0 | Status: DISCONTINUED | OUTPATIENT
Start: 2021-11-22 | End: 2021-11-24

## 2021-11-22 RX ADMIN — Medication 5 MILLIGRAM(S): at 21:21

## 2021-11-22 RX ADMIN — OXYCODONE HYDROCHLORIDE 10 MILLIGRAM(S): 5 TABLET ORAL at 16:00

## 2021-11-22 RX ADMIN — SENNA PLUS 2 TABLET(S): 8.6 TABLET ORAL at 21:21

## 2021-11-22 RX ADMIN — Medication 81 MILLIGRAM(S): at 15:03

## 2021-11-22 RX ADMIN — OXYCODONE HYDROCHLORIDE 10 MILLIGRAM(S): 5 TABLET ORAL at 15:02

## 2021-11-22 RX ADMIN — BUDESONIDE AND FORMOTEROL FUMARATE DIHYDRATE 2 PUFF(S): 160; 4.5 AEROSOL RESPIRATORY (INHALATION) at 21:37

## 2021-11-22 RX ADMIN — ENOXAPARIN SODIUM 30 MILLIGRAM(S): 100 INJECTION SUBCUTANEOUS at 15:03

## 2021-11-22 RX ADMIN — ALBUTEROL 1 PUFF(S): 90 AEROSOL, METERED ORAL at 08:36

## 2021-11-22 RX ADMIN — OXYCODONE HYDROCHLORIDE 10 MILLIGRAM(S): 5 TABLET ORAL at 08:31

## 2021-11-22 RX ADMIN — PANTOPRAZOLE SODIUM 40 MILLIGRAM(S): 20 TABLET, DELAYED RELEASE ORAL at 05:48

## 2021-11-22 RX ADMIN — OXYCODONE HYDROCHLORIDE 10 MILLIGRAM(S): 5 TABLET ORAL at 09:30

## 2021-11-22 RX ADMIN — BUDESONIDE AND FORMOTEROL FUMARATE DIHYDRATE 2 PUFF(S): 160; 4.5 AEROSOL RESPIRATORY (INHALATION) at 08:36

## 2021-11-22 NOTE — PROGRESS NOTE ADULT - SUBJECTIVE AND OBJECTIVE BOX
HPI:  This is a 61 y/o female with PMH of HTN, hyperlipidemia, chronic CHF, CAD s/p MI 2010, TIA s/p loop recorder placement 2018, left BBB, asthma,  c/o both leg pain associated with paresthesia and difficulty walking, she presents to [facility] on 11/11 for elective T10-T12 decompression/fusion. she underwent T10-12 decompression, T10-11 fusion on 11/11/2021 with Dr. Salazar.  Patient tolerated procedure well, no post-op complications. She was evaluated postoperatively by physical and occupational therapists and acute rehab was recommended.       (17 Nov 2021 14:58)    INTERVAL HPI/OVERNIGHT EVENTS:  Chart Reviewed and patient seen at bedside.  Patient had no complaints or issues over the weekend.   Patient requests for FMLA form to be filled out for her daughter.   +BM, no other complaints or issues.      ROS:  Denies fevers, chills, chest pain, shortness of breath, abdominal pain, headaches, nausea/vomiting    Vital Signs Last 24 Hrs  T(C): 36.7 (22 Nov 2021 09:00), Max: 36.8 (21 Nov 2021 20:07)  T(F): 98 (22 Nov 2021 09:00), Max: 98.2 (21 Nov 2021 20:07)  HR: 101 (22 Nov 2021 09:00) (80 - 101)  BP: 110/68 (22 Nov 2021 09:00) (98/61 - 120/67)  BP(mean): --  RR: 16 (22 Nov 2021 09:00) (16 - 16)  SpO2: 98% (22 Nov 2021 09:00) (98% - 99%)    Physical Exam:  Gen - NAD, Comfortable  HEENT - NCAT, EOMI, MMM  Neck - Supple, No limited ROM  Pulm - CTAB, No wheeze, No rhonchi, No crackles  Cardiovascular - RRR, S1S2, No murmurs  Abdomen - Soft, NT/ND, +BS  Extremities -no leg edema  Skin--incision clean, steristrips +, blister opened today - allewyn dressing +, buttock area - small area of denuded skin from prior scabs        Neuro-  Fully oriented  Motor function UE 5/5  RLE Hip 3/5 knee/ankle 4/5  LLE Hip 2/5, Knee/ankle 3/5  Sensory - Intact to LT, decreased to left shin  Reflexes - DTR Intact, No primitive reflexive  Coordination - FTN intact  Tone - normal  Psychiatric - Mood stable, Affect WNL    LABS:  11-22    141  |  106  |  21  ----------------------------<  99  4.2   |  28  |  0.78    Ca    8.8      22 Nov 2021 05:05    TPro  6.5  /  Alb  2.8<L>  /  TBili  0.4  /  DBili  x   /  AST  21  /  ALT  20  /  AlkPhos  70  11-22                          9.5    7.04  )-----------( 253      ( 22 Nov 2021 05:05 )             30.0     CAPILLARY BLOOD GLUCOSE          MEDICATIONS:  MEDICATIONS  (STANDING):  aspirin  chewable 81 milliGRAM(s) Oral daily  bisacodyl 5 milliGRAM(s) Oral at bedtime  budesonide  80 MICROgram(s)/formoterol 4.5 MICROgram(s) Inhaler 2 Puff(s) Inhalation two times a day  enoxaparin Injectable 30 milliGRAM(s) SubCutaneous daily  metoprolol succinate ER 50 milliGRAM(s) Oral daily  pantoprazole    Tablet 40 milliGRAM(s) Oral before breakfast  senna 2 Tablet(s) Oral at bedtime    MEDICATIONS  (PRN):  acetaminophen     Tablet .. 650 milliGRAM(s) Oral every 4 hours PRN Temp greater or equal to 38C (100.4F), Mild Pain (1 - 3)  ALBUTerol    90 MICROgram(s) HFA Inhaler 1 Puff(s) Inhalation every 6 hours PRN Shortness of Breath and/or Wheezing  methocarbamol 500 milliGRAM(s) Oral three times a day PRN Muscle Spasm  oxyCODONE    IR 10 milliGRAM(s) Oral every 4 hours PRN Severe Pain (7 - 10)  oxyCODONE    IR 5 milliGRAM(s) Oral every 4 hours PRN Moderate Pain (4 - 6)         HPI:  This is a 61 y/o female with PMH of HTN, hyperlipidemia, chronic CHF, CAD s/p MI 2010, TIA s/p loop recorder placement 2018, left BBB, asthma,  c/o both leg pain associated with paresthesia and difficulty walking, she presents to [facility] on 11/11 for elective T10-T12 decompression/fusion. she underwent T10-12 decompression, T10-11 fusion on 11/11/2021 with Dr. Salazar.  Patient tolerated procedure well, no post-op complications. She was evaluated postoperatively by physical and occupational therapists and acute rehab was recommended.     (17 Nov 2021 14:58)    INTERVAL HPI/OVERNIGHT EVENTS:  Chart Reviewed and patient seen at bedside.  Patient had no complaints or issues over the weekend.   Patient requests for FMLA form to be filled out for her daughter.   +BM, no other complaints or issues.      ROS:  Denies fevers, chills, chest pain, shortness of breath, abdominal pain, headaches, nausea/vomiting    Vital Signs Last 24 Hrs  T(C): 36.7 (22 Nov 2021 09:00), Max: 36.8 (21 Nov 2021 20:07)  T(F): 98 (22 Nov 2021 09:00), Max: 98.2 (21 Nov 2021 20:07)  HR: 101 (22 Nov 2021 09:00) (80 - 101)  BP: 110/68 (22 Nov 2021 09:00) (98/61 - 120/67)  BP(mean): --  RR: 16 (22 Nov 2021 09:00) (16 - 16)  SpO2: 98% (22 Nov 2021 09:00) (98% - 99%)    Physical Exam:  Gen - NAD, Comfortable  HEENT - NCAT, EOMI, MMM  Neck - Supple, No limited ROM  Pulm - CTAB, No wheeze, No rhonchi, No crackles  Cardiovascular - RRR, S1S2, No murmurs  Abdomen - Soft, NT/ND, +BS  Extremities -no leg edema  Skin--incision clean, steristrips +, blister opened today - allewyn dressing +, buttock area - small area of denuded skin from prior scabs        Neuro-  Fully oriented  Motor function UE 5/5  RLE Hip 3/5 knee/ankle 4/5  LLE Hip 2/5, Knee/ankle 3/5  Sensory - Intact to LT, decreased to left shin  Reflexes - DTR Intact, No primitive reflexive  Coordination - FTN intact  Tone - normal  Psychiatric - Mood stable, Affect WNL    LABS:  11-22    141  |  106  |  21  ----------------------------<  99  4.2   |  28  |  0.78    Ca    8.8      22 Nov 2021 05:05    TPro  6.5  /  Alb  2.8<L>  /  TBili  0.4  /  DBili  x   /  AST  21  /  ALT  20  /  AlkPhos  70  11-22                          9.5    7.04  )-----------( 253      ( 22 Nov 2021 05:05 )             30.0     CAPILLARY BLOOD GLUCOSE          MEDICATIONS:  MEDICATIONS  (STANDING):  aspirin  chewable 81 milliGRAM(s) Oral daily  bisacodyl 5 milliGRAM(s) Oral at bedtime  budesonide  80 MICROgram(s)/formoterol 4.5 MICROgram(s) Inhaler 2 Puff(s) Inhalation two times a day  enoxaparin Injectable 30 milliGRAM(s) SubCutaneous daily  metoprolol succinate ER 50 milliGRAM(s) Oral daily  pantoprazole    Tablet 40 milliGRAM(s) Oral before breakfast  senna 2 Tablet(s) Oral at bedtime    MEDICATIONS  (PRN):  acetaminophen     Tablet .. 650 milliGRAM(s) Oral every 4 hours PRN Temp greater or equal to 38C (100.4F), Mild Pain (1 - 3)  ALBUTerol    90 MICROgram(s) HFA Inhaler 1 Puff(s) Inhalation every 6 hours PRN Shortness of Breath and/or Wheezing  methocarbamol 500 milliGRAM(s) Oral three times a day PRN Muscle Spasm  oxyCODONE    IR 10 milliGRAM(s) Oral every 4 hours PRN Severe Pain (7 - 10)  oxyCODONE    IR 5 milliGRAM(s) Oral every 4 hours PRN Moderate Pain (4 - 6)

## 2021-11-22 NOTE — PROGRESS NOTE ADULT - ASSESSMENT
59 y/o female with PMH of HTN, hyperlipidemia, chronic CHF, CAD s/p MI 2010, TIA s/p loop recorder placement 2018, left BBB, asthma,  c/o both leg pain associated with paresthesia and difficulty walking, she presents to [facility] on 11/11 for elective T10-T12 decompression/fusion with Dr. Salazar. Patient now with gait Instability, ADL impairments and Functional impairments- pt/ot/dvt ppx, pain meds    # Spinal stenosis/ back incisional wound  -s/p T10-12 decompression, T10-11 fusion on 11/11/2021 with Dr. Salazar  -Continue comprehensive rehab  -wound care per wound care team    #HTN  #CAD  -s/p MI in 2010  -TIA s/p loop recorder in 2018  -c/w ASA + Metoprolol for now    #Anemia  -Postoperative anemia  -Stable H/H  -No concern for acute blood loss  -Monitor     #Asthma  -Symbicort BID  -Albuterol     #DVT ppx  -Lovenox

## 2021-11-22 NOTE — PROGRESS NOTE ADULT - ASSESSMENT
61 y/o female with PMH of HTN, hyperlipidemia, chronic CHF, CAD s/p MI 2010, TIA s/p loop recorder placement 2018, left BBB, asthma,  c/o both leg pain associated with paresthesia and difficulty walking, she presents to [facility] on 11/11 for elective T10-T12 decompression/fusion with Dr. Salazar. Patient now with gait Instability, ADL impairments and Functional impairments.    * Hypotension, lisinopril on hold, toprolol dose reduced to 50mg daily - now improved BP    # Spinal stenosis  - s/p T10-12 decompression, T10-11 posterior fusion on 11/11/2021 with Dr. Salazar  - Start Comprehensive Rehab Program: PT/OT/ST  - PT: Focused on improving strength, endurance, coordination, balance, functional mobility, and transfers  - OT: Focused on improving strength, fine motor skills, coordination, posture and ADLs.    - Remove staples/sutures post-op day #15 (11/26/2021)    #HTN  - Metoprolol succinate 75mg daily reduced to 50mg on 11/18/21 due to hypotension  - Lisinopril 10mg daily--held from 11/18/21 due to low BP    #CAD  - s/p MI in 2010  - TIA s/p loop recorder in 2018  - c/w ASA daily    #Asthma  - Symbicort  BID  - Albuterol     #Pain management  - Tylenol PRN, Oxycodone PRN, flexeril PRN    #DVT ppx  - Lovenox, SCD, TEDs    #Bowel Regimen  - Senna, dulcolax    #Bladder management  - BS on admission, and q 8 hours (SC if > 400)  - Monitor UO    #FEN   - Diet: Regular    #Precaution  - Fall, Spinal     #Skin:  - Blister to lower back - covered w/ alyvene only, currently.  - wound care consult appreciated:  Gentle normal saline cleanse, pat dry daily.  Apply Cavillon film barrier to intact blister & periwound daily, covered by 'tented' Allveyn foam dressing.  Offload pressure to area to prevent blister rupture.  Should blister rupture, consider Mupirocin ointment twice daily covered by Allevyn foam.  Continue dry dressing over steri strips daily for comfort.  - Pressure injury/Skin:  OOB to Chair, PT/OT     #Sleep:   - Maintain quiet hours and low stim environment.  - Melatonin PRN to maximize participation in therapy during the day.   - Monitor sleep logs    Outpatient Follow-up (Specialty/Name of physician):    Ben Salazar)  [address], [address]  [place] [address]  Phone: [phone]  Fax: [phone]  APPT: NOV 29, 2021    F/U PCP 61 y/o female with PMH of HTN, hyperlipidemia, chronic CHF, CAD s/p MI 2010, TIA s/p loop recorder placement 2018, left BBB, asthma,  c/o both leg pain associated with paresthesia and difficulty walking, she presents to [facility] on 11/11 for elective T10-T12 decompression/fusion with Dr. Salazar. Patient now with gait Instability, ADL impairments and Functional impairments.    * Hypotension, lisinopril on hold, toprolol dose reduced to 50mg daily - now improved BP    # Spinal stenosis  - s/p T10-12 decompression, T10-11 posterior fusion on 11/11/2021 with Dr. Salazar  - Start Comprehensive Rehab Program: PT/OT/ST  - PT: Focused on improving strength, endurance, coordination, balance, functional mobility, and transfers  - OT: Focused on improving strength, fine motor skills, coordination, posture and ADLs.    - Remove staples/sutures post-op day #15 (11/26/2021)    #HTN  - Metoprolol succinate 75mg daily reduced to 50mg on 11/18/21 due to hypotension  - Lisinopril 10mg daily--held from 11/18/21 due to low BP    #CAD  - s/p MI in 2010  - TIA s/p loop recorder in 2018  - c/w ASA daily    #Asthma  - Symbicort  BID  - Albuterol     #Pain management  - Tylenol PRN, Oxycodone PRN, flexeril PRN    #DVT ppx  - Lovenox, SCD, TEDs    #Bowel Regimen  - Senna, dulcolax    #Bladder management  - BS on admission, and q 8 hours (SC if > 400)  - Monitor UO    #FEN   - Diet: Regular    #Precaution  - Fall, Spinal     #Skin:  - Blister to lower back - covered w/ alyvene only, currently.  - wound care consult appreciated:  Gentle normal saline cleanse, pat dry daily.  Apply Cavillon film barrier to intact blister & periwound daily, covered by 'tented' Allveyn foam dressing.  Offload pressure to area to prevent blister rupture.  Should blister rupture, consider Mupirocin ointment twice daily covered by Allevyn foam.  Continue dry dressing over steri strips daily for comfort.  - Pressure injury/Skin:  OOB to Chair, PT/OT     #Sleep:   - Maintain quiet hours and low stim environment.  - Melatonin PRN to maximize participation in therapy during the day.   - Monitor sleep logs    Outpatient Follow-up (Specialty/Name of physician):    Ben Salazar)  [facility]  [address], [address]  [place] [address]  Phone: [phone]  Fax: [phone]  APPT: NOV 29, 2021    F/U PCP    Therapy  Making marked functional progress   B/L LE motor function now 4+/5, ambulating increasing distance

## 2021-11-22 NOTE — PROGRESS NOTE ADULT - SUBJECTIVE AND OBJECTIVE BOX
Patient is a 60y old  Female who presents with a chief complaint of Orthopaedic Disorder (21 Nov 2021 14:55)      SUBJECTIVE / OVERNIGHT EVENTS:  Pt seen and examined at bedside. No acute events overnight.  Pt denies cp, palpitations, sob, abd pain, N/V, fever, chills.    ROS:  All other review of systems negative    Allergies    No Known Drug Allergies  shellfish (Anaphylaxis)    Intolerances        MEDICATIONS  (STANDING):  aspirin  chewable 81 milliGRAM(s) Oral daily  bisacodyl 5 milliGRAM(s) Oral at bedtime  budesonide  80 MICROgram(s)/formoterol 4.5 MICROgram(s) Inhaler 2 Puff(s) Inhalation two times a day  enoxaparin Injectable 30 milliGRAM(s) SubCutaneous daily  metoprolol succinate ER 50 milliGRAM(s) Oral daily  pantoprazole    Tablet 40 milliGRAM(s) Oral before breakfast  senna 2 Tablet(s) Oral at bedtime    MEDICATIONS  (PRN):  acetaminophen     Tablet .. 650 milliGRAM(s) Oral every 4 hours PRN Temp greater or equal to 38C (100.4F), Mild Pain (1 - 3)  ALBUTerol    90 MICROgram(s) HFA Inhaler 1 Puff(s) Inhalation every 6 hours PRN Shortness of Breath and/or Wheezing  methocarbamol 500 milliGRAM(s) Oral three times a day PRN Muscle Spasm  oxyCODONE    IR 10 milliGRAM(s) Oral every 4 hours PRN Severe Pain (7 - 10)  oxyCODONE    IR 5 milliGRAM(s) Oral every 4 hours PRN Moderate Pain (4 - 6)      Vital Signs Last 24 Hrs  T(C): 36.7 (22 Nov 2021 09:00), Max: 36.8 (21 Nov 2021 20:07)  T(F): 98 (22 Nov 2021 09:00), Max: 98.2 (21 Nov 2021 20:07)  HR: 101 (22 Nov 2021 09:00) (80 - 101)  BP: 110/68 (22 Nov 2021 09:00) (98/61 - 120/67)  BP(mean): --  RR: 16 (22 Nov 2021 09:00) (16 - 16)  SpO2: 98% (22 Nov 2021 09:00) (98% - 99%)  CAPILLARY BLOOD GLUCOSE        I&O's Summary      PHYSICAL EXAM:  GENERAL: NAD, well-developed  HEAD:  Atraumatic, Normocephalic  EYES: EOMI, PERRLA, conjunctiva and sclera clear  NECK: Supple, No JVD  CHEST/LUNG: Clear to auscultation bilaterally; No wheeze, nonlabored breathing  HEART: Regular rate and rhythm; No murmurs, rubs, or gallops  ABDOMEN: Soft, Nontender, Nondistended; Bowel sounds present  EXTREMITIES:  2+ Peripheral Pulses, No clubbing, cyanosis, or edema  NEUROLOGY: AAOx3, non-focal  PSYCH: calm, appropriate mood  SKIN: Spinal incision c/d/i. Lower back wound nonerythematous, dressing intact    LABS:                        9.5    7.04  )-----------( 253      ( 22 Nov 2021 05:05 )             30.0     11-22    141  |  106  |  21  ----------------------------<  99  4.2   |  28  |  0.78    Ca    8.8      22 Nov 2021 05:05    TPro  6.5  /  Alb  2.8<L>  /  TBili  0.4  /  DBili  x   /  AST  21  /  ALT  20  /  AlkPhos  70  11-22              RADIOLOGY & ADDITIONAL TESTS:  Results Reviewed:   Imaging Personally Reviewed:  Electrocardiogram Personally Reviewed:    COORDINATION OF CARE:  Care Discussed with Consultants/Other Providers [Y/N]:  Prior or Outpatient Records Reviewed [Y/N]:

## 2021-11-22 NOTE — PROGRESS NOTE ADULT - ATTENDING COMMENTS
61 y/o F, Admitted to acute rehab treatment 11/17/21 after 10-T12 decompression/fusion on 11/11/21 at [facility] for severe spinal stenosis with LE radicular symptoms and urge incontinence,  Has hx of  HTN, hyperlipidemia, chronic CHF, CAD s/p MI 2010, TIA s/p loop recorder placement 2018, left BBB and, asthma,    Seen and examined  In no acute distress  Therapist reported low BP readings systolic in 88--90s around end of therapy session.  HR normal, no head ache, dizziness or chest pain    Exam in her room, afterwards  Patient was in no distress, asymptomatic, bp low normal, HR normal    Mild hypotension, probably due to aggressive BP control,   Hold lisinopril, c/w beta blocker considering cardiac hx  monitor vitals q4hr for 8hr and continue regular monitoring afterwards if systolic bp >100.  will reduce dose of b blocker if bp remains low or patient becomes symptomatic    continue therapy as tolerated,     All other care as previously stated in admission note  f/u with hospitalist for hypotension if persistent      S/E with Dr Danielson, Rehab Med resident, patient stable to continue acute rehab treatment, while monitoring BP and HR
61 y/o F, Admitted to acute rehab treatment 11/17/21 after 10-T12 decompression/fusion on 11/11/21 at [facility] for severe spinal stenosis with LE radicular symptoms and urge incontinence,  Has hx of  HTN, hyperlipidemia, chronic CHF, CAD s/p MI 2010, TIA s/p loop recorder placement 2018, left BBB and, asthma,    Hypotension resolved  Made functional progress with therapy  LE motor function 4+/5  Blister on lower back f/u with wound care recs  Surgical wound dry    Vitals and lab unremarkable    continue therapy as tolerated, d/c planning  Complete FMLA forms  Team meeting tomorrow and DC planning   Patient stable to continue acute rehab treatment,

## 2021-11-23 ENCOUNTER — TRANSCRIPTION ENCOUNTER (OUTPATIENT)
Age: 61
End: 2021-11-23

## 2021-11-23 PROCEDURE — 99232 SBSQ HOSP IP/OBS MODERATE 35: CPT

## 2021-11-23 PROCEDURE — 99239 HOSP IP/OBS DSCHRG MGMT >30: CPT

## 2021-11-23 RX ORDER — ALBUTEROL 90 UG/1
1 AEROSOL, METERED ORAL
Qty: 0 | Refills: 0 | DISCHARGE
Start: 2021-11-23

## 2021-11-23 RX ORDER — PANTOPRAZOLE SODIUM 20 MG/1
1 TABLET, DELAYED RELEASE ORAL
Qty: 30 | Refills: 0
Start: 2021-11-23 | End: 2021-12-22

## 2021-11-23 RX ORDER — METHOCARBAMOL 500 MG/1
1 TABLET, FILM COATED ORAL
Qty: 30 | Refills: 0
Start: 2021-11-23 | End: 2021-12-22

## 2021-11-23 RX ORDER — LISINOPRIL 2.5 MG/1
1 TABLET ORAL
Qty: 0 | Refills: 0 | DISCHARGE

## 2021-11-23 RX ORDER — METOPROLOL TARTRATE 50 MG
1 TABLET ORAL
Qty: 0 | Refills: 0 | DISCHARGE
Start: 2021-11-23

## 2021-11-23 RX ORDER — SENNA PLUS 8.6 MG/1
2 TABLET ORAL
Qty: 60 | Refills: 0
Start: 2021-11-23 | End: 2021-12-22

## 2021-11-23 RX ORDER — METOPROLOL TARTRATE 50 MG
75 TABLET ORAL
Qty: 0 | Refills: 0 | DISCHARGE

## 2021-11-23 RX ORDER — ACETAMINOPHEN 500 MG
2 TABLET ORAL
Qty: 0 | Refills: 0 | DISCHARGE
Start: 2021-11-23

## 2021-11-23 RX ORDER — MUPIROCIN 20 MG/G
1 OINTMENT TOPICAL
Qty: 1 | Refills: 0
Start: 2021-11-23 | End: 2021-12-22

## 2021-11-23 RX ADMIN — ENOXAPARIN SODIUM 30 MILLIGRAM(S): 100 INJECTION SUBCUTANEOUS at 11:18

## 2021-11-23 RX ADMIN — PANTOPRAZOLE SODIUM 40 MILLIGRAM(S): 20 TABLET, DELAYED RELEASE ORAL at 06:49

## 2021-11-23 RX ADMIN — Medication 50 MILLIGRAM(S): at 05:03

## 2021-11-23 RX ADMIN — BUDESONIDE AND FORMOTEROL FUMARATE DIHYDRATE 2 PUFF(S): 160; 4.5 AEROSOL RESPIRATORY (INHALATION) at 21:49

## 2021-11-23 RX ADMIN — Medication 81 MILLIGRAM(S): at 11:18

## 2021-11-23 RX ADMIN — OXYCODONE HYDROCHLORIDE 10 MILLIGRAM(S): 5 TABLET ORAL at 22:17

## 2021-11-23 RX ADMIN — METHOCARBAMOL 500 MILLIGRAM(S): 500 TABLET, FILM COATED ORAL at 21:36

## 2021-11-23 RX ADMIN — OXYCODONE HYDROCHLORIDE 10 MILLIGRAM(S): 5 TABLET ORAL at 21:36

## 2021-11-23 NOTE — ADVANCED PRACTICE NURSE CONSULT - ASSESSMENT
Patient admitted s/p spinal surgery with intact steri strips to Thoracic incision.  Proximal & inferiorly to right of incision was a serous filled blister, now ruptured.  Wound is circular, approx 2.5 cm, with red granular tissue at base.  Scant bleeding is noted.  Periwound is intact, no warmth, edema or erythema.

## 2021-11-23 NOTE — DISCHARGE NOTE PROVIDER - CARE PROVIDER_API CALL
Ben Salazar)  [facility]  [address], [address]  [place] [address]  Phone: [phone]  Fax: [phone]  Established Patient  Scheduled Appointment: 11/29/2021    Augusta Sanchez; MPH)  Surgery  Phys Medicine  [facility]  [address] [place] [address]  Phone: [phone]  Fax: [phone]  Follow Up Time: 1 month

## 2021-11-23 NOTE — DISCHARGE NOTE PROVIDER - NSDCFUADDAPPT_GEN_ALL_CORE_FT
Rehab course significant for low blood pressure. Lisinopril was stopped and metoprolol ER was decreased from 75mg to 50mg. Patient advised to have close follow up with PCP and Cardiologist regarding new changes to her medication. You have an appointment with your Cardiologist on 11/30/21.

## 2021-11-23 NOTE — ADVANCED PRACTICE NURSE CONSULT - RECOMMEDATIONS
Suggest gentle normal saline cleanse, pat dry daily.  Apply Cavillon film barrier to intact blister & periwound daily, covered by 'tented' Allveyn foam dressing.  Offload pressure to area to prevent blister rupture.  Should blister rupture, consider Mupirocin ointment twice daily covered by Allevyn foam.  Continue dry dressing over steri strips daily for comfort.  Nutritional support per Dietician's recommendations.  
Suggest gentle normal saline cleanse, pat dry daily.  Apply Cavillon film barrier to periwound daily.   Apply Mupirocin ointment twice daily covered by Telfa non-adherent dressing & Tegaderm.  Continue dry dressing over steri strips daily for comfort.  Continue nutritional support per Dietician's recommendations.

## 2021-11-23 NOTE — PROGRESS NOTE ADULT - ASSESSMENT
59 y/o female with PMH of HTN, hyperlipidemia, chronic CHF, CAD s/p MI 2010, TIA s/p loop recorder placement 2018, left BBB, asthma,  c/o both leg pain associated with paresthesia and difficulty walking, she presents to [facility] on 11/11 for elective T10-T12 decompression/fusion with Dr. Salazar. Patient now with gait Instability, ADL impairments and Functional impairments.    * Hypotension, lisinopril on hold, toprolol dose reduced to 50mg daily -BP stable  * Team meeting 11/23--dc date 11/24    # Spinal stenosis  - s/p T10-12 decompression, T10-11 posterior fusion on 11/11/2021 with Dr. Salazar  - Start Comprehensive Rehab Program: PT/OT/ST  - PT: Focused on improving strength, endurance, coordination, balance, functional mobility, and transfers  - OT: Focused on improving strength, fine motor skills, coordination, posture and ADLs.    - Remove staples/sutures post-op day #15 (11/26/2021)    #HTN  - Metoprolol succinate 75mg daily reduced to 50mg on 11/18/21 due to hypotension  - Lisinopril 10mg daily--held from 11/18/21 due to low BP    #CAD  - s/p MI in 2010  - TIA s/p loop recorder in 2018  - c/w ASA daily    #Asthma  - Symbicort  BID  - Albuterol     #Pain management  - Tylenol PRN, Oxycodone PRN, flexeril PRN    #DVT ppx  - Lovenox, SCD, TEDs    #Bowel Regimen  - Senna, dulcolax    #Bladder management  - BS on admission, and q 8 hours (SC if > 400)  - Monitor UO    #FEN   - Diet: Regular    #Precaution  - Fall, Spinal     #Skin:  - Blister to lower back - covered w/ alyvene only, currently.  - wound care consult appreciated:  Gentle normal saline cleanse, pat dry daily.  Apply Cavillon film barrier to intact blister & periwound daily, covered by 'tented' Allveyn foam dressing.  Offload pressure to area to prevent blister rupture.  Should blister rupture, consider Mupirocin ointment twice daily covered by Allevyn foam.  Continue dry dressing over steri strips daily for comfort.  - Pressure injury/Skin:  OOB to Chair, PT/OT     #Sleep:   - Maintain quiet hours and low stim environment.  - Melatonin PRN to maximize participation in therapy during the day.   - Monitor sleep logs    Outpatient Follow-up (Specialty/Name of physician):    Ben Salazar)  [facility]  [address], [address]  [place] [address]  Phone: [phone]  Fax: [phone]  APPT: NOV 29, 2021    F/U PCP    Therapy  Continue functional progress,  Ambulating 150 ft with RW, Negotiating 12 steps with CGA  Goals--Modified independence, patient asking for earlier d/c date  Estimated dc 11/24, changed to 11/23  B/L LE motor function now 4+/5, ambulating increasing distance    Summary   59 y/o F, Admitted to acute rehab treatment 11/17/21 after 10-T12 decompression/fusion on 11/11/21 at [facility] for severe spinal stenosis with LE radicular symptoms and urge incontinence,  Has hx of  HTN, hyperlipidemia, chronic CHF, CAD s/p MI 2010, TIA s/p loop recorder placement 2018, left BBB and, asthma,    Sustained functional improvement  LE motor function 4+/5  Blister on lower back opened, dress with mupirocin  Surgical wound dry    Vitals and lab unremarkable  Team meeting--sustained progress, patient requests earlier d/c date    Patient stable to continue acute rehab treatment, dc tomorrow 11/24

## 2021-11-23 NOTE — DISCHARGE NOTE PROVIDER - NSDCFUSCHEDAPPT_GEN_ALL_CORE_FT
MARLEN MCFARLAND ; 11/29/2021 ; NPP OrthoSurg [address] [facility]  MARLEN MCFARLAND ; 11/30/2021 ; NPP Cardio Electro [id number] [facility]

## 2021-11-23 NOTE — PROGRESS NOTE ADULT - SUBJECTIVE AND OBJECTIVE BOX
HPI:  This is a 59 y/o female with PMH of HTN, hyperlipidemia, chronic CHF, CAD s/p MI 2010, TIA s/p loop recorder placement 2018, left BBB, asthma,  c/o both leg pain associated with paresthesia and difficulty walking, she presents to [facility] on 11/11 for elective T10-T12 decompression/fusion. she underwent T10-12 decompression, T10-11 fusion on 11/11/2021 with Dr. Salazar.  Patient tolerated procedure well, no post-op complications. She was evaluated postoperatively by physical and occupational therapists and acute rehab was recommended.     (17 Nov 2021 14:58)    INTERVAL HPI/OVERNIGHT EVENTS:  Chart Reviewed and patient seen/examined at bedside.  Patient had no complaints and requests home d/c tomorrow  FMLA forms for her two children completed  Reports normal bowel movement, blister near surgical wound, opened and wound care recs mupirocin cream  Surgical dressing clear and dry  +BM, no other complaints or issues.    Team meeting today 1/23--Team noted profound improvement, ambulating increasing distance with CGA.  Est dc date 24, but patient prefers 12/23    ROS:  Denies fevers, chills, chest pain, shortness of breath, abdominal pain, headaches, nausea/vomiting  Vital Signs Last 24 Hrs  T(C): 36.8 (23 Nov 2021 08:09), Max: 37 (22 Nov 2021 21:08)  T(F): 98.2 (23 Nov 2021 08:09), Max: 98.6 (22 Nov 2021 21:08)  HR: 72 (23 Nov 2021 08:09) (72 - 103)  BP: 112/62 (23 Nov 2021 08:09) (109/68 - 117/73)  RR: 16 (23 Nov 2021 08:09) (16 - 16)  SpO2: 98% (23 Nov 2021 08:09) (97% - 98%)    Physical Exam:  Gen -Comfortable  HEENT - NCAT, EOMI, MMM  Neck - Supple, ROM normal  Pulm - No wheeze, No rhonchi, No crackles  Cardiovascular - RRR, S1S2, No murmurs  Abdomen - Soft,non tender, +BS  Extremities -no leg edema  Skin--incision clean, steristrips +, blister opened today - mupirocin ordered  , buttock area - small area of denuded skin from prior scabs        Neuro-  Fully oriented  Motor function UE 5/5  RLE Hip 4/5 knee/ankle 4/5  LLE Hip 4/5, Knee/ankle 4/5  Sensory - Intact to LT, decreased to left shin  Reflexes - DTR Intact, No primitive reflexive  Coordination - no dysmetria  Tone - normal  Psychiatric - Mood stable, Affect WNL    LABS:  11-22    141  |  106  |  21  ----------------------------<  99  4.2   |  28  |  0.78    Ca    8.8      22 Nov 2021 05:05    TPro  6.5  /  Alb  2.8<L>  /  TBili  0.4  /  DBili  x   /  AST  21  /  ALT  20  /  AlkPhos  70  11-22                          9.5    7.04  )-----------( 253      ( 22 Nov 2021 05:05 )             30.0     CAPILLARY BLOOD GLUCOSE    MEDICATIONS  (STANDING):  aspirin  chewable 81 milliGRAM(s) Oral daily  bisacodyl 5 milliGRAM(s) Oral at bedtime  budesonide  80 MICROgram(s)/formoterol 4.5 MICROgram(s) Inhaler 2 Puff(s) Inhalation two times a day  enoxaparin Injectable 30 milliGRAM(s) SubCutaneous daily  metoprolol succinate ER 50 milliGRAM(s) Oral daily  pantoprazole    Tablet 40 milliGRAM(s) Oral before breakfast  senna 2 Tablet(s) Oral at bedtime    MEDICATIONS  (PRN):  acetaminophen     Tablet .. 650 milliGRAM(s) Oral every 4 hours PRN Temp greater or equal to 38C (100.4F), Mild Pain (1 - 3)  ALBUTerol    90 MICROgram(s) HFA Inhaler 1 Puff(s) Inhalation every 6 hours PRN Shortness of Breath and/or Wheezing  methocarbamol 500 milliGRAM(s) Oral three times a day PRN Muscle Spasm  mupirocin 2% Ointment 1 Application(s) Topical two times a day PRN if Back blister ruptures  oxyCODONE    IR 10 milliGRAM(s) Oral every 4 hours PRN Severe Pain (7 - 10)  oxyCODONE    IR 5 milliGRAM(s) Oral every 4 hours PRN Moderate Pain (4 - 6)    Team meeting/therapy--making functional progress  motor function improved, ambulating increasing distance

## 2021-11-23 NOTE — DISCHARGE NOTE PROVIDER - NSDCMRMEDTOKEN_GEN_ALL_CORE_FT
acetaminophen 325 mg oral tablet: 2 tab(s) orally every 4 hours, As needed, Temp greater or equal to 38C (100.4F), Mild Pain (1 - 3)  albuterol 90 mcg/inh inhalation aerosol: 1 puff(s) inhaled every 6 hours, As needed, Shortness of Breath and/or Wheezing  Aspirin Enteric Coated 81 mg oral delayed release tablet: 1 tab(s) orally once a day  bisacodyl 5 mg oral delayed release tablet: 1 tab(s) orally once a day (at bedtime) -for constipation   methocarbamol 500 mg oral tablet: 1 tab(s) orally once a day (at bedtime), As Needed -Muscle Spasm . DO NOT OPERATE OR DRIVE MACHINERY IF YOU DECIDE TO TAKE DURING DAYTIME  metoprolol succinate 50 mg oral tablet, extended release: 1 tab(s) orally once a day  mupirocin 2% topical ointment: 1 application topically 2 times a day, As needed,  for back blister rupture  pantoprazole 40 mg oral delayed release tablet: 1 tab(s) orally once a day (before a meal)  senna oral tablet: 2 tab(s) orally once a day (at bedtime) -for constipation   Symbicort 80 mcg-4.5 mcg/inh inhalation aerosol: 2 puff(s) inhaled 2 times a day

## 2021-11-23 NOTE — DISCHARGE NOTE PROVIDER - PROVIDER TOKENS
PROVIDER:[TOKEN:[[id number]],SCHEDULEDAPPT:[11/29/2021],ESTABLISHEDPATIENT:[T]],PROVIDER:[TOKEN:[[id number]],FOLLOWUP:[1 month]]

## 2021-11-23 NOTE — DISCHARGE NOTE PROVIDER - NSDCFUADDINST_GEN_ALL_CORE_FT
For your back blister:   gentle normal saline cleanse, pat dry daily.  Apply Cavillon film barrier to periwound daily.   Apply Mupirocin ointment twice daily covered by Telfa non-adherent dressing & Tegaderm.  Continue dry dressing over steri strips daily for comfort.    Surgeon's Instructions:  Continue weight bearing as tolerated ambulation, with rolling walker. Have dressing/sutures/staples removed and steri-strips applied post operative day #15 (11/26/2021)if applicable. Follow up with  in his office 3-4 weeks post op

## 2021-11-23 NOTE — DISCHARGE NOTE PROVIDER - HOSPITAL COURSE
HPI:  This is a 59 y/o female with PMH of HTN, hyperlipidemia, chronic CHF, CAD s/p MI 2010, TIA s/p loop recorder placement 2018, left BBB, asthma,  c/o both leg pain associated with paresthesia and difficulty walking, she presents to [facility] on 11/11 for elective T10-T12 decompression/fusion. she underwent T10-12 decompression, T10-11 fusion on 11/11/2021 with Dr. Salazar.  Patient tolerated procedure well, no post-op complications. She was evaluated postoperatively by physical and occupational therapists and acute rehab was recommended.       (17 Nov 2021 14:58)      Patient was evaluated by PM&R and therapy for gait/ADL impairments and recommended acute rehabilitation. Patient was medically optimized for discharge to [facility] on 11/17/21. Admitted with gait instability, ADL, and functional impairments.     Rehab course significant for low blood pressure. Lisinopril was stopped and metoprolol ER was decreased from 75mg to 50mg. Patient advised to have close follow up with PCP and Cardiologist regarding new changes to her medication. Patient also has a blister on right side of back, not approximate to the incision site, ruptured on 11/23/21. Wound care recommendations were given by wound care consultant. Patient advised to follow up with PMD and Spine surgeon for monitoring of wound.     Wound is circular, approx 2.5 cm, with red granular tissue at base.  Scant bleeding is noted.  Periwound is intact, no warmth, edema or erythema.    All other medical co-morbidities were stable. Patient tolerated course of inpatient PT/OT/SLP rehab with significant improvements and met rehab goals prior to discharge. Patient was medically cleared on11/24/21 for discharge to home w/ outpatient PT.

## 2021-11-23 NOTE — DISCHARGE NOTE PROVIDER - NSDCCPCAREPLAN_GEN_ALL_CORE_FT
PRINCIPAL DISCHARGE DIAGNOSIS  Diagnosis: Spinal stenosis of lumbar region  Assessment and Plan of Treatment: You underwent T10-12 decompression, T10-11 fusion on 2021 with Dr. Salazar. You finished full course of rehab at [facility] with PT and OT. You will continue your rehab outpatient. Please follow up with Dr. Salazar post op to have your staples removed.      SECONDARY DISCHARGE DIAGNOSES  Diagnosis: HTN (hypertension)  Assessment and Plan of Treatment: Your blood pressure was low during your stay at [facility]. Lisinopril was discontinued and metoprolol ER was  from 75mg to 50mg. Follow up with your cardiologist after discharge.

## 2021-11-23 NOTE — PROGRESS NOTE ADULT - SUBJECTIVE AND OBJECTIVE BOX
Patient is a 60y old  Female who presents with a chief complaint of Orthopaedic Disorder (22 Nov 2021 11:30)    Patient seen and examined at bedside. No acute overnight events. Denies fever, chills, chest pain, shortness of breath, nausea, vomiting. +numbness/tingling of b/l feet    ALLERGIES:  No Known Drug Allergies  shellfish (Anaphylaxis)    MEDICATIONS  (STANDING):  aspirin  chewable 81 milliGRAM(s) Oral daily  bisacodyl 5 milliGRAM(s) Oral at bedtime  budesonide  80 MICROgram(s)/formoterol 4.5 MICROgram(s) Inhaler 2 Puff(s) Inhalation two times a day  enoxaparin Injectable 30 milliGRAM(s) SubCutaneous daily  metoprolol succinate ER 50 milliGRAM(s) Oral daily  pantoprazole    Tablet 40 milliGRAM(s) Oral before breakfast  senna 2 Tablet(s) Oral at bedtime    MEDICATIONS  (PRN):  acetaminophen     Tablet .. 650 milliGRAM(s) Oral every 4 hours PRN Temp greater or equal to 38C (100.4F), Mild Pain (1 - 3)  ALBUTerol    90 MICROgram(s) HFA Inhaler 1 Puff(s) Inhalation every 6 hours PRN Shortness of Breath and/or Wheezing  methocarbamol 500 milliGRAM(s) Oral three times a day PRN Muscle Spasm  mupirocin 2% Ointment 1 Application(s) Topical two times a day PRN if Back blister ruptures  oxyCODONE    IR 10 milliGRAM(s) Oral every 4 hours PRN Severe Pain (7 - 10)  oxyCODONE    IR 5 milliGRAM(s) Oral every 4 hours PRN Moderate Pain (4 - 6)    Vital Signs Last 24 Hrs  T(F): 98.2 (23 Nov 2021 08:09), Max: 98.6 (22 Nov 2021 21:08)  HR: 72 (23 Nov 2021 08:09) (72 - 103)  BP: 112/62 (23 Nov 2021 08:09) (109/68 - 117/73)  RR: 16 (23 Nov 2021 08:09) (16 - 16)  SpO2: 98% (23 Nov 2021 08:09) (97% - 98%)  I&O's Summary    PHYSICAL EXAM:  General: NAD, A/O x 3. Pleasant and cooperative  ENT: MMM, no tonsilar exudate  Neck: Supple, No JVD  Lungs: Clear to auscultation bilaterally, no wheezes. Good air entry bilaterally   Cardio: RRR, S1/S2, No murmurs  Abdomen: Soft, Nontender, Nondistended; Bowel sounds present  Extremities: No calf tenderness, No pitting edema    LABS:                        9.5    7.04  )-----------( 253      ( 22 Nov 2021 05:05 )             30.0       11-22    141  |  106  |  21  ----------------------------<  99  4.2   |  28  |  0.78    Ca    8.8      22 Nov 2021 05:05    TPro  6.5  /  Alb  2.8  /  TBili  0.4  /  DBili  x   /  AST  21  /  ALT  20  /  AlkPhos  70  11-22     eGFR if Non African American: 82 mL/min/1.73M2 (11-22-21 @ 05:05)  eGFR if African American: 95 mL/min/1.73M2 (11-22-21 @ 05:05)    COVID-19 PCR: NotDetec (11-18-21 @ 16:30)  COVID-19 PCR: NotDetec (11-16-21 @ 16:11)  COVID-19 PCR: NotDetec (11-08-21 @ 12:56)    RADIOLOGY & ADDITIONAL TESTS:     Care Discussed with Consultants/Other Providers:

## 2021-11-23 NOTE — PROGRESS NOTE ADULT - ASSESSMENT
59 y/o female with PMH of HTN, hyperlipidemia, chronic CHF, CAD s/p MI 2010, TIA s/p loop recorder placement 2018, left BBB, asthma,  c/o both leg pain associated with paresthesia and difficulty walking, she presents to [facility] on 11/11 for elective T10-T12 decompression/fusion with Dr. Salazar. Patient now with gait Instability, ADL impairments and Functional impairments- pt/ot/dvt ppx, pain meds    # Spinal stenosis/ back incisional wound  -s/p T10-12 decompression, T10-11 fusion on 11/11/2021 with Dr. Salazar  -Continue comprehensive rehab  -wound care per wound care team    #HTN  #CAD  -s/p MI in 2010  -TIA s/p loop recorder in 2018  -c/w ASA + Metoprolol for now    #Anemia  -Postoperative anemia  -Stable H/H  -No concern for acute blood loss  -Monitor     #Asthma  -Symbicort BID  -Albuterol     #DVT ppx  -Lovenox

## 2021-11-24 ENCOUNTER — TRANSCRIPTION ENCOUNTER (OUTPATIENT)
Age: 61
End: 2021-11-24

## 2021-11-24 VITALS
TEMPERATURE: 98 F | RESPIRATION RATE: 16 BRPM | SYSTOLIC BLOOD PRESSURE: 107 MMHG | OXYGEN SATURATION: 100 % | DIASTOLIC BLOOD PRESSURE: 58 MMHG

## 2021-11-24 PROCEDURE — 94640 AIRWAY INHALATION TREATMENT: CPT

## 2021-11-24 PROCEDURE — 97530 THERAPEUTIC ACTIVITIES: CPT

## 2021-11-24 PROCEDURE — U0003: CPT

## 2021-11-24 PROCEDURE — 97112 NEUROMUSCULAR REEDUCATION: CPT

## 2021-11-24 PROCEDURE — U0005: CPT

## 2021-11-24 PROCEDURE — 99232 SBSQ HOSP IP/OBS MODERATE 35: CPT

## 2021-11-24 PROCEDURE — 80053 COMPREHEN METABOLIC PANEL: CPT

## 2021-11-24 PROCEDURE — 97167 OT EVAL HIGH COMPLEX 60 MIN: CPT

## 2021-11-24 PROCEDURE — 97110 THERAPEUTIC EXERCISES: CPT

## 2021-11-24 PROCEDURE — 85025 COMPLETE CBC W/AUTO DIFF WBC: CPT

## 2021-11-24 PROCEDURE — 97535 SELF CARE MNGMENT TRAINING: CPT

## 2021-11-24 PROCEDURE — 97163 PT EVAL HIGH COMPLEX 45 MIN: CPT

## 2021-11-24 PROCEDURE — 97116 GAIT TRAINING THERAPY: CPT

## 2021-11-24 PROCEDURE — 36415 COLL VENOUS BLD VENIPUNCTURE: CPT

## 2021-11-24 PROCEDURE — 86769 SARS-COV-2 COVID-19 ANTIBODY: CPT

## 2021-11-24 PROCEDURE — 86803 HEPATITIS C AB TEST: CPT

## 2021-11-24 RX ADMIN — PANTOPRAZOLE SODIUM 40 MILLIGRAM(S): 20 TABLET, DELAYED RELEASE ORAL at 06:32

## 2021-11-24 RX ADMIN — ENOXAPARIN SODIUM 30 MILLIGRAM(S): 100 INJECTION SUBCUTANEOUS at 12:28

## 2021-11-24 RX ADMIN — Medication 50 MILLIGRAM(S): at 06:32

## 2021-11-24 RX ADMIN — BUDESONIDE AND FORMOTEROL FUMARATE DIHYDRATE 2 PUFF(S): 160; 4.5 AEROSOL RESPIRATORY (INHALATION) at 08:28

## 2021-11-24 RX ADMIN — Medication 81 MILLIGRAM(S): at 12:28

## 2021-11-24 NOTE — CHART NOTE - NSCHARTNOTEFT_GEN_A_CORE
Nutrition Follow Up Note  Hospital Course   (Per Electronic Medical Record)    Source:  Patient [X]  Medical Record [X]      Diet:   Regular Diet w/ Thin Liquids  Tolerates Diet Consistency Well  No Chewing/Swallowing Difficulties  No Recent Nausea, Vomiting, Diarrhea & Some Recent Constipation (as Per Patient)  Education Provided on Need for Increased Fluids  Consumes % of Meals (as Per Documentation)  - States Good PO Intake/Appetite     Enteral/Parenteral Nutrition: Not Applicable    Current Weight: 161.5lb on 11/28  Obtain Weights Daily   Weights Currently Stable @This Time   Weight Most Likely an Error on 11/19    Pertinent Medications: MEDICATIONS  (STANDING):  aspirin  chewable 81 milliGRAM(s) Oral daily  bisacodyl 5 milliGRAM(s) Oral at bedtime  budesonide  80 MICROgram(s)/formoterol 4.5 MICROgram(s) Inhaler 2 Puff(s) Inhalation two times a day  enoxaparin Injectable 30 milliGRAM(s) SubCutaneous daily  metoprolol succinate ER 50 milliGRAM(s) Oral daily  pantoprazole    Tablet 40 milliGRAM(s) Oral before breakfast  senna 2 Tablet(s) Oral at bedtime    MEDICATIONS  (PRN):  acetaminophen     Tablet .. 650 milliGRAM(s) Oral every 4 hours PRN Temp greater or equal to 38C (100.4F), Mild Pain (1 - 3)  ALBUTerol    90 MICROgram(s) HFA Inhaler 1 Puff(s) Inhalation every 6 hours PRN Shortness of Breath and/or Wheezing  methocarbamol 500 milliGRAM(s) Oral three times a day PRN Muscle Spasm  mupirocin 2% Ointment 1 Application(s) Topical two times a day PRN if Back blister ruptures  oxyCODONE    IR 10 milliGRAM(s) Oral every 4 hours PRN Severe Pain (7 - 10)  oxyCODONE    IR 5 milliGRAM(s) Oral every 4 hours PRN Moderate Pain (4 - 6)    Pertinent Labs:  11-22 Na141 mmol/L Glu 99 mg/dL K+ 4.2 mmol/L Cr  0.78 mg/dL BUN 21 mg/dL 11-22 Alb 2.8 g/dL<L>    Skin: No Pressure Ulcers  Surgical Incision on L-Spine  (as Per Nursing Flow Sheet)     Edema: None Noted Recently (as Per Documentation)     Last Bowel Movement: on 11/23    Estimated Needs:   [X] No Change Since Previous Assessment    Previous Nutrition Diagnosis:   No Active Nutrition Dx @ This Present Time    Nutrition Diagnosis is [X] Not Applicable     New Nutrition Diagnosis: [X] Not Applicable    Interventions:   1. Education Provided on Need for Increased Fluids  2. Recommend Continue Nutrition Plan of Care     Monitoring & Evaluation:   [X] Weights   [X] PO Intake   [X] Skin Integrity   [X] Follow Up (Per Protocol)  [X] Tolerance to Diet Prescription   [X] Other: Labs     Registered Dietitian/Nutritionist Remains Available.  Anthony Anand RDN    Pager #[phone]  Phone# [phone]

## 2021-11-24 NOTE — PROGRESS NOTE ADULT - SUBJECTIVE AND OBJECTIVE BOX
Patient is a 60y old  Female who presents with a chief complaint of Orthopaedic Disorder (23 Nov 2021 20:15)    Patient seen and examined at bedside. No acute overnight events. Excited for discharge today. Denies fever, chills, nausea, vomiting.     ALLERGIES:  No Known Drug Allergies  shellfish (Anaphylaxis)    MEDICATIONS  (STANDING):  aspirin  chewable 81 milliGRAM(s) Oral daily  bisacodyl 5 milliGRAM(s) Oral at bedtime  budesonide  80 MICROgram(s)/formoterol 4.5 MICROgram(s) Inhaler 2 Puff(s) Inhalation two times a day  enoxaparin Injectable 30 milliGRAM(s) SubCutaneous daily  metoprolol succinate ER 50 milliGRAM(s) Oral daily  pantoprazole    Tablet 40 milliGRAM(s) Oral before breakfast  senna 2 Tablet(s) Oral at bedtime    MEDICATIONS  (PRN):  acetaminophen     Tablet .. 650 milliGRAM(s) Oral every 4 hours PRN Temp greater or equal to 38C (100.4F), Mild Pain (1 - 3)  ALBUTerol    90 MICROgram(s) HFA Inhaler 1 Puff(s) Inhalation every 6 hours PRN Shortness of Breath and/or Wheezing  methocarbamol 500 milliGRAM(s) Oral three times a day PRN Muscle Spasm  mupirocin 2% Ointment 1 Application(s) Topical two times a day PRN if Back blister ruptures  oxyCODONE    IR 10 milliGRAM(s) Oral every 4 hours PRN Severe Pain (7 - 10)  oxyCODONE    IR 5 milliGRAM(s) Oral every 4 hours PRN Moderate Pain (4 - 6)    Vital Signs Last 24 Hrs  T(F): 98.3 (24 Nov 2021 09:16), Max: 98.4 (23 Nov 2021 20:04)  HR: 88 (23 Nov 2021 21:50) (88 - 90)  BP: 107/58 (24 Nov 2021 09:16) (107/58 - 111/63)  RR: 16 (24 Nov 2021 09:16) (15 - 16)  SpO2: 100% (24 Nov 2021 09:16) (96% - 100%)  I&O's Summary    PHYSICAL EXAM:  General: NAD, A/O x 3  ENT: MMM, no tonsilar exudate  Neck: Supple, No JVD  Lungs: Clear to auscultation bilaterally, no wheezes. Good air entry bilaterally   Cardio: RRR, S1/S2, No murmurs  Abdomen: Soft, Nontender, Nondistended; Bowel sounds present  Extremities: No calf tenderness, No pitting edema    LABS:                        9.5    7.04  )-----------( 253      ( 22 Nov 2021 05:05 )             30.0       11-22    141  |  106  |  21  ----------------------------<  99  4.2   |  28  |  0.78    Ca    8.8      22 Nov 2021 05:05    TPro  6.5  /  Alb  2.8  /  TBili  0.4  /  DBili  x   /  AST  21  /  ALT  20  /  AlkPhos  70  11-22     eGFR if Non African American: 82 mL/min/1.73M2 (11-22-21 @ 05:05)  eGFR if African American: 95 mL/min/1.73M2 (11-22-21 @ 05:05)    COVID-19 PCR: NotDetec (11-18-21 @ 16:30)  COVID-19 PCR: NotDetec (11-16-21 @ 16:11)  COVID-19 PCR: NotDetec (11-08-21 @ 12:56)    RADIOLOGY & ADDITIONAL TESTS:     Care Discussed with Consultants/Other Providers:

## 2021-11-24 NOTE — PROGRESS NOTE ADULT - REASON FOR ADMISSION
Lumbar stenosis s/p V64--Y96 Laminectomy
Orthopaedic Disorder
Spondylosis without myelopathy  LBP
T10-T12 decompression/fusion
Orthopaedic Disorder
Orthopaedic Disorder
Thoracic spine stenosis s/p T1-T12 Laminectomy
No

## 2021-11-24 NOTE — PROGRESS NOTE ADULT - ASSESSMENT
59 y/o female with PMH of HTN, hyperlipidemia, chronic CHF, CAD s/p MI 2010, TIA s/p loop recorder placement 2018, left BBB, asthma,  c/o both leg pain associated with paresthesia and difficulty walking, she presents to [facility] on 11/11 for elective T10-T12 decompression/fusion with Dr. Salazar. Patient now with gait Instability, ADL impairments and Functional impairments.    During the course of treatment Lisinopril d/osmin and Toprolol dose reduced, bp stabilized  Stable on d/c     # Spinal stenosis  - s/p T10-12 decompression, T10-11 posterior fusion on 11/11/2021 with Dr. Salazar  - Start Comprehensive Rehab Program: PT/OT/ST  - PT: Focused on improving strength, endurance, coordination, balance, functional mobility, and transfers  - OT: Focused on improving strength, fine motor skills, coordination, posture and ADLs.    - Remove staples/sutures post-op day #15 (11/26/2021)    #HTN  - Metoprolol succinate 75mg daily reduced to 50mg on 11/18/21 due to hypotension  - Lisinopril 10mg daily--held from 11/18/21 due to low BP    #CAD  - s/p MI in 2010  - TIA s/p loop recorder in 2018  - c/w ASA daily    #Asthma  - Symbicort  BID  - Albuterol     #Pain management  - Tylenol PRN, Oxycodone PRN, flexeril PRN    #DVT ppx  - Lovenox, SCD, TEDs    #Bowel Regimen  - Senna, dulcolax    #Bladder management  - BS on admission, and q 8 hours (SC if > 400)  - Monitor UO    #FEN   - Diet: Regular    #Precaution  - Fall, Spinal     #Skin:  - Blister to lower back -open, dress with mupirocin    #Sleep:   - Maintain quiet hours and low stim environment.  - Melatonin PRN to maximize participation in therapy during the day.   - Monitor sleep logs    Outpatient Follow-up (Specialty/Name of physician):    Ben Salazar)  [facility]  [address], [address]  [place] [address]  Phone: [phone]  Fax: [phone]  APPT: NOV 29, 2021    F/U PCP    Summary   59 y/o F, Admitted to acute rehab treatment 11/17/21 after 10-T12 decompression/fusion on 11/11/21 at [facility] for severe spinal stenosis with LE radicular symptoms and urge incontinence,  Has hx of  HTN, hyperlipidemia, chronic CHF, CAD s/p MI 2010, TIA s/p loop recorder placement 2018, left BBB and, asthma,    BP stabilized on lower dose of HTN meds  Sustained functional improvement  LE motor function 5-/5  Good static balance, and dynamic balance with DME  Out patient PT as ordered, suture removal 11/26 with surgeon  Family coming to  patient    Vitals and lab unremarkable  DC home today, f/u with spine surgeon,

## 2021-11-24 NOTE — PROGRESS NOTE ADULT - PROVIDER SPECIALTY LIST ADULT
Hospitalist
Rehab Medicine
Hospitalist
Rehab Medicine
Rehab Medicine

## 2021-11-24 NOTE — DISCHARGE NOTE NURSING/CASE MANAGEMENT/SOCIAL WORK - PATIENT PORTAL LINK FT
You can access the FollowMyHealth Patient Portal offered by [facility] by registering at the following website: [email or web address]. By joining [employer]’s FollowMyHealth portal, you will also be able to view your health information using other applications (apps) compatible with our system.

## 2021-11-24 NOTE — PROGRESS NOTE ADULT - ASSESSMENT
59 y/o female with PMH of HTN, hyperlipidemia, chronic CHF, CAD s/p MI 2010, TIA s/p loop recorder placement 2018, left BBB, asthma,  c/o both leg pain associated with paresthesia and difficulty walking, she presents to [facility] on 11/11 for elective T10-T12 decompression/fusion with Dr. Salazar. Patient now with gait Instability, ADL impairments and Functional impairments- pt/ot/dvt ppx, pain meds    # Spinal stenosis/ back incisional wound  -s/p T10-12 decompression, T10-11 fusion on 11/11/2021 with Dr. Salazar  -Continue comprehensive rehab  -wound care per wound care team    #HTN  #CAD  -s/p MI in 2010  -TIA s/p loop recorder in 2018  -c/w ASA + Metoprolol for now    #Anemia  -Postoperative anemia  -Stable H/H  -No concern for acute blood loss  -Monitor     #Asthma  -Symbicort BID  -Albuterol     #DVT ppx  -Lovenox

## 2021-11-24 NOTE — PROGRESS NOTE ADULT - SUBJECTIVE AND OBJECTIVE BOX
HPI:  This is a 59 y/o female with PMH of HTN, hyperlipidemia, chronic CHF, CAD s/p MI 2010, TIA s/p loop recorder placement 2018, left BBB, asthma,  c/o both leg pain associated with paresthesia and difficulty walking, she presents to [facility] on 11/11 for elective T10-T12 decompression/fusion. she underwent T10-12 decompression, T10-11 fusion on 11/11/2021 with Dr. Salazar.  Patient tolerated procedure well, no post-op complications. She was evaluated postoperatively by physical and occupational therapists and acute rehab was recommended.     (17 Nov 2021 14:58)    INTERVAL HPI/OVERNIGHT EVENTS:  Chart Reviewed and patient seen/examined at bedside.   Patient had no complaint  Had normal bowel movement today, voiding appropriately  Surgical dressing clear and dry, has one suture on right lower part of surgical wound  Dressing over open blister, with healthy pink base  Vitals unremarkable  Stood up from her WC unassisted    ROS:  Denies fevers, chills, chest pain, shortness of breath, abdominal pain, headaches, nausea/vomiting  Vital Signs Last 24 Hrs  T(C): 36.8 (24 Nov 2021 09:16), Max: 36.9 (23 Nov 2021 20:04)  T(F): 98.3 (24 Nov 2021 09:16), Max: 98.4 (23 Nov 2021 20:04)  HR: 88 (23 Nov 2021 21:50) (88 - 90)  BP: 107/58 (24 Nov 2021 09:16) (107/58 - 111/63)  RR: 16 (24 Nov 2021 09:16) (15 - 16)  SpO2: 100% (24 Nov 2021 09:16) (96% - 100%)    Physical Exam:  Gen -Comfortable  HEENT -Symmetrical  Neck - Supple, ROM normal  Pulm - No wheeze, No rhonchi, No crackles  Cardiovascular - regular S1S2, No murmurs  Abdomen - Soft,non tender, +BS  Extremities -no leg edema  Skin--incision clean, steristrips +, blister pink base, one suture on right lower aspect of lower back surgical site     Neuro-  Fully oriented  Motor function UE 5/5  RLE Hip 4/5 knee/ankle 4/5  LLE Hip 4/5, Knee/ankle 4/5  Sensory - Intact to LT, decreased to left shin  Reflexes - DTR Intact, No primitive reflexive  Coordination - no dysmetria  Tone - normal  Psychiatric - Mood stable, Affect WNL    LABS:  11-22    141  |  106  |  21  ----------------------------<  99  4.2   |  28  |  0.78    Ca    8.8      22 Nov 2021 05:05    TPro  6.5  /  Alb  2.8<L>  /  TBili  0.4  /  DBili  x   /  AST  21  /  ALT  20  /  AlkPhos  70  11-22                          9.5    7.04  )-----------( 253      ( 22 Nov 2021 05:05 )             30.0     CAPILLARY BLOOD GLUCOSE    MEDICATIONS  (STANDING):  aspirin  chewable 81 milliGRAM(s) Oral daily  bisacodyl 5 milliGRAM(s) Oral at bedtime  budesonide  80 MICROgram(s)/formoterol 4.5 MICROgram(s) Inhaler 2 Puff(s) Inhalation two times a day  enoxaparin Injectable 30 milliGRAM(s) SubCutaneous daily  metoprolol succinate ER 50 milliGRAM(s) Oral daily  pantoprazole    Tablet 40 milliGRAM(s) Oral before breakfast  senna 2 Tablet(s) Oral at bedtime    MEDICATIONS  (PRN):  acetaminophen     Tablet .. 650 milliGRAM(s) Oral every 4 hours PRN Temp greater or equal to 38C (100.4F), Mild Pain (1 - 3)  ALBUTerol    90 MICROgram(s) HFA Inhaler 1 Puff(s) Inhalation every 6 hours PRN Shortness of Breath and/or Wheezing  methocarbamol 500 milliGRAM(s) Oral three times a day PRN Muscle Spasm  mupirocin 2% Ointment 1 Application(s) Topical two times a day PRN if Back blister ruptures  oxyCODONE    IR 10 milliGRAM(s) Oral every 4 hours PRN Severe Pain (7 - 10)  oxyCODONE    IR 5 milliGRAM(s) Oral every 4 hours PRN Moderate Pain (4 - 6)    Team meeting/therapy--Sustained progress,  Therapists recommence PT post DC  Has good UE function

## 2021-11-29 ENCOUNTER — APPOINTMENT (OUTPATIENT)
Dept: ORTHOPEDIC SURGERY | Facility: CLINIC | Age: 61
End: 2021-11-29
Payer: COMMERCIAL

## 2021-11-29 VITALS — BODY MASS INDEX: 25.43 KG/M2 | WEIGHT: 162 LBS | HEIGHT: 67 IN

## 2021-11-29 PROCEDURE — 99024 POSTOP FOLLOW-UP VISIT: CPT

## 2021-11-29 PROCEDURE — 72082 X-RAY EXAM ENTIRE SPI 2/3 VW: CPT

## 2021-11-29 NOTE — PHYSICAL EXAM
[de-identified] : Lumbar Physical Exam\par \par Gait -walking fluidly with a cane\par \par Station - Normal\par \par Sagittal balance - Normal\par \par Compensatory mechanism? - None\par \par \par Reflexes\par Patellar - normal\par Gastroc - normal\par Clonus - No\par \par Hip Exam - Normal\par \par Straight leg raise - none\par \par Pulses - 2+ dp/pt\par \par Range of motion - normal\par \par Sensation \par Sensation intact to light touch in L1, L2, L3, L4, L5 and S1 dermatomes bilaterally\par \par Motor\par 	IP	Quad	HS	TA	Gastroc	EHL\par Right	5/5	5/5	5/5	5/5	5/5	5/5\par Left	5/5	5/5	5/5	5/5	5/5	5/5 [de-identified] : Scoliosis radiographs\par Hardware in appropriate position\par No instability noted

## 2021-11-29 NOTE — HISTORY OF PRESENT ILLNESS
[de-identified] : Today the patient is now approximately 3 weeks out from T10-T12 decompression with he was in T10-T11 spinal fusion.  Overall she is doing very well.  She is walking with a cane.  She feels like her lower extremity strength has improved significantly.  She denies any severe radicular type symptoms.  She denies any new numbness or tingling.  Overall she is pleased with her recovery today.\par \par 11/8/21\par Today the patient states that she is still dealing with significant amount of balance issues.  She is dealing with radicular type pain, right worse than left.  She denies any bowel bladder issues.  She denies any saddle anesthesia.  She is currently walking with a cane. \par \par 10/18/21\par Today patient states that she is still having significant issues in terms of her balance and her lower extremity pain.  She does state that she has radiating pain down her bilateral legs, left worse than right.  She also feels that she is much more unsteady on her feet the past several months.  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  She does endorse numbness and tingling in her abdomen today but this is not below saddle anesthesia.  She denies any bowel bladder incontinence.

## 2021-11-29 NOTE — ASSESSMENT
[FreeTextEntry1] : This is a 60-year-old female that is now approximately 3 weeks out from T10-T11 posterior spinal fusion with T10-T12 decompression.  Overall she is done very well since surgery.  She is walking well.  She feels stronger in her right and left leg.  She feels like her balance is improved as well.  At this point we will continue with encouraging ambulation.  She can take Tylenol as needed for pain control.  She is off all pain medications which is another good sign.  She will come back and see me in 4 weeks for repeat clinical evaluation.  I encouraged her to follow-up sooner if she has any new or worsening symptoms.

## 2021-11-30 ENCOUNTER — APPOINTMENT (OUTPATIENT)
Dept: ELECTROPHYSIOLOGY | Facility: CLINIC | Age: 61
End: 2021-11-30
Payer: COMMERCIAL

## 2021-11-30 ENCOUNTER — NON-APPOINTMENT (OUTPATIENT)
Age: 61
End: 2021-11-30

## 2021-11-30 PROCEDURE — 93298 REM INTERROG DEV EVAL SCRMS: CPT

## 2021-11-30 PROCEDURE — G2066: CPT

## 2021-12-07 LAB — SURGICAL PATHOLOGY STUDY: SIGNIFICANT CHANGE UP

## 2021-12-27 ENCOUNTER — APPOINTMENT (OUTPATIENT)
Dept: ORTHOPEDIC SURGERY | Facility: CLINIC | Age: 61
End: 2021-12-27
Payer: COMMERCIAL

## 2021-12-27 VITALS — WEIGHT: 162 LBS | BODY MASS INDEX: 25.43 KG/M2 | HEIGHT: 67 IN

## 2021-12-27 PROCEDURE — 72100 X-RAY EXAM L-S SPINE 2/3 VWS: CPT

## 2021-12-27 PROCEDURE — 72070 X-RAY EXAM THORAC SPINE 2VWS: CPT

## 2021-12-27 PROCEDURE — 99024 POSTOP FOLLOW-UP VISIT: CPT

## 2021-12-27 NOTE — ASSESSMENT
[FreeTextEntry1] : I had a lengthy discussion with the patient regards to her treatment plan and diagnosis.  In my opinion she is recovered well from her surgery for thoracic spondylotic myelopathy.  She clearly still has some groin pain which could very well be from her lumbar spinal stenosis which was noted back on her MRI from May 2021.  At this point I would like her to start physical therapy to help stretch and strengthen some of the muscles in her core and lower extremities.  I think this might help some of her groin related pain symptoms.  I will have her follow-up in 6 weeks time.  At that time if she is still dealing with significant pain we may consider a new lumbar MRI and possible injection in her low back.  At this point however I think we should continue to observe her symptoms.  I did  her that she should follow-up with me sooner if she has any new or worsening symptoms.  She does have my direct contact information she knows to reach out to me at any time.

## 2021-12-27 NOTE — PHYSICAL EXAM
[de-identified] : Lumbar Physical Exam\par \par Gait -walking fluidly with a cane\par \par Station - Normal\par \par Sagittal balance - Normal\par \par Compensatory mechanism? - None\par \par \par Reflexes\par Patellar - normal\par Gastroc - normal\par Clonus - No\par \par Hip Exam - Normal\par \par Straight leg raise - none\par \par Pulses - 2+ dp/pt\par \par Range of motion - normal\par \par Sensation \par Sensation intact to light touch in L1, L2, L3, L4, L5 and S1 dermatomes bilaterally\par \par Motor\par 	IP	Quad	HS	TA	Gastroc	EHL\par Right	5/5	5/5	5/5	5/5	5/5	5/5\par Left	5/5	5/5	5/5	5/5	5/5	5/5\par \par Subjectively, the patient states that her strength in her legs has dramatically improved [de-identified] : Thoracic radiographs and lumbar radiographs reviewed\par No acute hardware complication\par Thoracolumbar alignment within normal limits

## 2021-12-27 NOTE — HISTORY OF PRESENT ILLNESS
[de-identified] : Today the patient is approximately 6 weeks out from T10-T12 decompression with T10-T11 fusion.  Overall she is doing very well.  She has had significant improvement in her strength.  She is walking better.  She is ready to increase her activities as well.  Today however she does still note some bilateral groin pain which can be significant at times.  She denies any bowel bladder issues.  She denies any saddle anesthesia.\par \par 11/29/21\par Today the patient is now approximately 3 weeks out from T10-T12 decompression with he was in T10-T11 spinal fusion.  Overall she is doing very well.  She is walking with a cane.  She feels like her lower extremity strength has improved significantly.  She denies any severe radicular type symptoms.  She denies any new numbness or tingling.  Overall she is pleased with her recovery today.\par \par 11/8/21\par Today the patient states that she is still dealing with significant amount of balance issues.  She is dealing with radicular type pain, right worse than left.  She denies any bowel bladder issues.  She denies any saddle anesthesia.  She is currently walking with a cane. \par \par 10/18/21\par Today patient states that she is still having significant issues in terms of her balance and her lower extremity pain.  She does state that she has radiating pain down her bilateral legs, left worse than right.  She also feels that she is much more unsteady on her feet the past several months.  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  She does endorse numbness and tingling in her abdomen today but this is not below saddle anesthesia.  She denies any bowel bladder incontinence.

## 2022-01-04 ENCOUNTER — APPOINTMENT (OUTPATIENT)
Dept: ELECTROPHYSIOLOGY | Facility: CLINIC | Age: 62
End: 2022-01-04
Payer: COMMERCIAL

## 2022-01-04 ENCOUNTER — NON-APPOINTMENT (OUTPATIENT)
Age: 62
End: 2022-01-04

## 2022-01-04 PROCEDURE — G2066: CPT

## 2022-01-04 PROCEDURE — 93298 REM INTERROG DEV EVAL SCRMS: CPT

## 2022-01-30 NOTE — PATIENT PROFILE ADULT - FLU SEASON?
Follow up with primary care provider if not improving    Discharge Instructions  Head Injury    You have been seen today for a head injury. Your evaluation included a history and physical examination. You may have had a CT (CAT) scan performed, though most head injuries do not require a scan. Based on this evaluation, your provider today does not feel that your head injury is serious.    Generally, every Emergency Department visit should have a follow-up clinic visit with either a primary or a specialty clinic/provider. Please follow-up as instructed by your emergency provider today.  Return to the Emergency Department if:  You are not acting right.  Your headache gets worse or you start to have a really bad headache even with your recommended treatment plan.  You vomit (throw up) more than once.  You have a seizure.  You have trouble walking.  You have weakness or paralysis (cannot move) in an arm or a leg.  You have blood or fluid coming from your ears or nose.  You have new symptoms or anything that worries you.    Sleeping:  It is okay for you to sleep. Someone should check on you at your usual time to wake up.     Activity:  Do not drive if you have dizzy spells or trouble concentrating, or remembering things.  Do not return to any contact sports until cleared by your regular provider.     MORE INFORMATION:    Concussion:  A concussion is a minor head injury that may cause temporary problems with the way the brain works. Although concussions are important, they are generally not an emergency or a reason that a person needs to be hospitalized. Some concussion symptoms include confusion, amnesia (forgetful), nausea (sick to your stomach) and vomiting (throwing up), dizziness, fatigue, memory or concentration problems, irritability and sleep problems. For most people, concussions are mild and temporary but some will have more severe and persistent symptoms that require on-going care and treatment.  CT Scans: Your  evaluation today may have included a CT scan (CAT scan) to look for things like bleeding or a skull fracture (broken bone).  CT scans involve radiation and too many CT scans can cause serious health problems like cancer, especially in children.  Because of this, your provider may not have ordered a CT scan today if they think you are at low risk for a serious or life threatening problem.    If you were given a prescription for medicine here today, be sure to read all of the information (including the package insert) that comes with your prescription.  This will include important information about the medicine, its side effects, and any warnings that you need to know about.  The pharmacist who fills the prescription can provide more information and answer questions you may have about the medicine.  If you have questions or concerns that the pharmacist cannot address, please call or return to the Emergency Department.     Remember that you can always come back to the Emergency Department if you are not able to see your regular provider in the amount of time listed above, if you get any new symptoms, or if there is anything that worries you.     Yes...

## 2022-02-08 ENCOUNTER — APPOINTMENT (OUTPATIENT)
Dept: ELECTROPHYSIOLOGY | Facility: CLINIC | Age: 62
End: 2022-02-08
Payer: COMMERCIAL

## 2022-02-08 ENCOUNTER — NON-APPOINTMENT (OUTPATIENT)
Age: 62
End: 2022-02-08

## 2022-02-08 PROCEDURE — 93298 REM INTERROG DEV EVAL SCRMS: CPT

## 2022-02-08 PROCEDURE — G2066: CPT

## 2022-02-09 ENCOUNTER — APPOINTMENT (OUTPATIENT)
Dept: ORTHOPEDIC SURGERY | Facility: CLINIC | Age: 62
End: 2022-02-09
Payer: COMMERCIAL

## 2022-02-09 DIAGNOSIS — M47.14 OTHER SPONDYLOSIS WITH MYELOPATHY, THORACIC REGION: ICD-10-CM

## 2022-02-09 PROCEDURE — 99024 POSTOP FOLLOW-UP VISIT: CPT

## 2022-02-09 PROCEDURE — 72070 X-RAY EXAM THORAC SPINE 2VWS: CPT

## 2022-02-09 NOTE — ASSESSMENT
[FreeTextEntry1] : I had a long discussion with the patient regards her treatment plan and diagnosis.  In my opinion she has signs and symptoms which are consistent with her preoperative diagnosis of thoracic spondylotic myelopathy.  She has gotten improvement in terms of her gait in my opinion.  She is however complaining of paresthesias and tingling in her lower extremities which are worse at night.  I did speak with her neurologist who did do an EMG/nerve conduction study which did not show any signs or symptoms of any sort of acute neurologic injury but rather sequela of her thoracic myelopathy.  I do not find any objective neurologic deficits on her exam.  Her gait is symmetric and fairly normal with a cane. In order to rule out any structural issue however I will obtain a thoracic MRI with and without contrast to ensure she has no cord level impingement.  I will see her back in approximately 3 to 4 weeks for repeat clinical evaluation.  Encouraged her to follow-up sooner if she has any new or worsening symptoms.

## 2022-02-09 NOTE — PHYSICAL EXAM
[de-identified] : Lumbar Physical Exam\par \par Gait -walking with a fluid gait in the halls in office with a cane\par \par Station - Normal\par \par Sagittal balance - Normal\par \par Compensatory mechanism? - None\par \par Reflexes\par Patellar - normal\par Gastroc - normal\par Clonus - No\par \par Hip Exam - Normal\par \par Straight leg raise - none\par \par Pulses - 2+ dp/pt\par \par Range of motion -reduced\par \par Sensation \par Sensation intact to light touch in L1, L2, L3, L4, L5 and S1 dermatomes bilaterally\par \par Motor\par 	IP	Quad	HS	TA	Gastroc	EHL\par Right	5/5	5/5	5/5	5/5	5/5	5/5\par Left	5/5	5/5	5/5	5/5	5/5	5/5\par \par The patient is strong in her bilateral lower extremities.  She has no neurologic deficits that are objectively able to be found\par Her sensation is intact in all dermatomes [de-identified] : Thoracic radiographs and lumbar radiographs reviewed\par No acute hardware complication\par Thoracolumbar alignment within normal limits

## 2022-02-09 NOTE — HISTORY OF PRESENT ILLNESS
[de-identified] : Today the patient is now 3 months out from T10-T12 decompression with T10-T11 fusion.  As compared to prior to surgery she is doing well.  She is walking with a cane.  She does have nighttime tingling and discomfort in her lower extremities as well as groin pain.  She does still feel like her walk/gait is waddling.  She is working with physical therapy and she does feel like this does help.  She denies any bowel bladder issues.  She denies any saddle anesthesia.\par \par 12/27/21\par Today the patient is approximately 6 weeks out from T10-T12 decompression with T10-T11 fusion.  Overall she is doing very well.  She has had significant improvement in her strength.  She is walking better.  She is ready to increase her activities as well.  Today however she does still note some bilateral groin pain which can be significant at times.  She denies any bowel bladder issues.  She denies any saddle anesthesia.\par \par 11/29/21\par Today the patient is now approximately 3 weeks out from T10-T12 decompression with he was in T10-T11 spinal fusion.  Overall she is doing very well.  She is walking with a cane.  She feels like her lower extremity strength has improved significantly.  She denies any severe radicular type symptoms.  She denies any new numbness or tingling.  Overall she is pleased with her recovery today.\par \par 11/8/21\par Today the patient states that she is still dealing with significant amount of balance issues.  She is dealing with radicular type pain, right worse than left.  She denies any bowel bladder issues.  She denies any saddle anesthesia.  She is currently walking with a cane. \par \par 10/18/21\par Today patient states that she is still having significant issues in terms of her balance and her lower extremity pain.  She does state that she has radiating pain down her bilateral legs, left worse than right.  She also feels that she is much more unsteady on her feet the past several months.  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  She does endorse numbness and tingling in her abdomen today but this is not below saddle anesthesia.  She denies any bowel bladder incontinence.

## 2022-03-10 ENCOUNTER — OUTPATIENT (OUTPATIENT)
Dept: OUTPATIENT SERVICES | Facility: HOSPITAL | Age: 62
LOS: 1 days | End: 2022-03-10
Payer: COMMERCIAL

## 2022-03-10 ENCOUNTER — APPOINTMENT (OUTPATIENT)
Dept: MRI IMAGING | Facility: IMAGING CENTER | Age: 62
End: 2022-03-10
Payer: COMMERCIAL

## 2022-03-10 ENCOUNTER — RESULT REVIEW (OUTPATIENT)
Age: 62
End: 2022-03-10

## 2022-03-10 DIAGNOSIS — Z98.890 OTHER SPECIFIED POSTPROCEDURAL STATES: Chronic | ICD-10-CM

## 2022-03-10 DIAGNOSIS — Z98.891 HISTORY OF UTERINE SCAR FROM PREVIOUS SURGERY: Chronic | ICD-10-CM

## 2022-03-10 DIAGNOSIS — Z98.49 CATARACT EXTRACTION STATUS, UNSPECIFIED EYE: Chronic | ICD-10-CM

## 2022-03-10 DIAGNOSIS — Z00.8 ENCOUNTER FOR OTHER GENERAL EXAMINATION: ICD-10-CM

## 2022-03-10 DIAGNOSIS — M54.50 LOW BACK PAIN, UNSPECIFIED: ICD-10-CM

## 2022-03-10 DIAGNOSIS — Z98.51 TUBAL LIGATION STATUS: Chronic | ICD-10-CM

## 2022-03-10 PROCEDURE — 72148 MRI LUMBAR SPINE W/O DYE: CPT | Mod: 26

## 2022-03-10 PROCEDURE — 72148 MRI LUMBAR SPINE W/O DYE: CPT

## 2022-03-15 ENCOUNTER — APPOINTMENT (OUTPATIENT)
Dept: ORTHOPEDIC SURGERY | Facility: CLINIC | Age: 62
End: 2022-03-15

## 2022-03-15 ENCOUNTER — APPOINTMENT (OUTPATIENT)
Dept: ELECTROPHYSIOLOGY | Facility: CLINIC | Age: 62
End: 2022-03-15
Payer: COMMERCIAL

## 2022-03-15 ENCOUNTER — NON-APPOINTMENT (OUTPATIENT)
Age: 62
End: 2022-03-15

## 2022-03-15 PROCEDURE — G2066: CPT

## 2022-03-15 PROCEDURE — 93298 REM INTERROG DEV EVAL SCRMS: CPT

## 2022-03-21 ENCOUNTER — APPOINTMENT (OUTPATIENT)
Dept: ORTHOPEDIC SURGERY | Facility: CLINIC | Age: 62
End: 2022-03-21
Payer: COMMERCIAL

## 2022-03-21 VITALS
OXYGEN SATURATION: 98 % | BODY MASS INDEX: 25.43 KG/M2 | SYSTOLIC BLOOD PRESSURE: 121 MMHG | HEIGHT: 67 IN | DIASTOLIC BLOOD PRESSURE: 74 MMHG | WEIGHT: 162 LBS | HEART RATE: 85 BPM

## 2022-03-21 PROCEDURE — 99214 OFFICE O/P EST MOD 30 MIN: CPT

## 2022-03-21 NOTE — PHYSICAL EXAM
[de-identified] : Lumbar Physical Exam\par \par Gait -the patient is walking  with a cane in the office\par \par Station - Normal\par \par Sagittal balance - Normal\par \par Compensatory mechanism? - None\par \par Reflexes\par Patellar - normal\par Gastroc - normal\par Clonus - No\par \par Hip Exam - Normal\par \par Straight leg raise - none\par \par Pulses - 2+ dp/pt\par \par Range of motion -reduced\par \par Sensation \par Sensation intact to light touch in L1, L2, L3, L4, L5 and S1 dermatomes bilaterally\par \par Motor\par 	IP	Quad	HS	TA	Gastroc	EHL\par Right	5/5	5/5	5/5	5/5	5/5	5/5\par Left	5/5	5/5	5/5	5/5	5/5	5/5\par \par The patient is strong in her bilateral lower extremities.  She has no neurologic deficits that are objectively able to be found\par Her sensation is intact in all dermatomes [de-identified] : Thoracic radiographs and lumbar radiographs reviewed\par No acute hardware complication\par Thoracolumbar alignment within normal limits\par \par Thoracic MRI reviewed\par No areas of critical central stenosis\par No areas of critical foraminal stenosis\par At the fusion site, decompression site there is no stenosis of the thoracic cord\par I do not note any myelomalacia\par Images are in  radiology

## 2022-03-21 NOTE — HISTORY OF PRESENT ILLNESS
[de-identified] : Today the patient states that she is still having some issues with pain in her groin.  She is walking but she is still having issues with balance.  She denies any bowel bladder issues.  She denies any saddle anesthesia.  She has been diligently working with physical therapy.\par \par 02/09/22\par Today the patient is now 3 months out from T10-T12 decompression with T10-T11 fusion.  As compared to prior to surgery she is doing well.  She is walking with a cane.  She does have nighttime tingling and discomfort in her lower extremities as well as groin pain.  She does still feel like her walk/gait is waddling.  She is working with physical therapy and she does feel like this does help.  She denies any bowel bladder issues.  She denies any saddle anesthesia.\par \par 12/27/21\par Today the patient is approximately 6 weeks out from T10-T12 decompression with T10-T11 fusion.  Overall she is doing very well.  She has had significant improvement in her strength.  She is walking better.  She is ready to increase her activities as well.  Today however she does still note some bilateral groin pain which can be significant at times.  She denies any bowel bladder issues.  She denies any saddle anesthesia.\par \par 11/29/21\par Today the patient is now approximately 3 weeks out from T10-T12 decompression with he was in T10-T11 spinal fusion.  Overall she is doing very well.  She is walking with a cane.  She feels like her lower extremity strength has improved significantly.  She denies any severe radicular type symptoms.  She denies any new numbness or tingling.  Overall she is pleased with her recovery today.\par \par 11/8/21\par Today the patient states that she is still dealing with significant amount of balance issues.  She is dealing with radicular type pain, right worse than left.  She denies any bowel bladder issues.  She denies any saddle anesthesia.  She is currently walking with a cane. \par \par 10/18/21\par Today patient states that she is still having significant issues in terms of her balance and her lower extremity pain.  She does state that she has radiating pain down her bilateral legs, left worse than right.  She also feels that she is much more unsteady on her feet the past several months.  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  She does endorse numbness and tingling in her abdomen today but this is not below saddle anesthesia.  She denies any bowel bladder incontinence.

## 2022-03-21 NOTE — ASSESSMENT
[FreeTextEntry1] : This is a 61-year-old female who is here today for evaluation of her low back and leg symptoms.  She does have symptomatic lumbar radiculopathy and she has lumbar spinal stenosis based on her imaging studies.  I would like her to follow-up with [facility] for evaluation and treatment of the symptoms.  I still like her to continue working with physical therapy.  I would like to rule out any residual areas of stenosis in her cervical spine and therefore I am ordering a cervical MRI as well.  I will have her follow-up in 3 to 4 weeks for repeat clinical evaluation.  I encouraged her to follow-up sooner if she has any new or worsening symptoms

## 2022-04-04 ENCOUNTER — NON-APPOINTMENT (OUTPATIENT)
Age: 62
End: 2022-04-04

## 2022-04-07 DIAGNOSIS — M25.851 OTHER SPECIFIED JOINT DISORDERS, RIGHT HIP: ICD-10-CM

## 2022-04-08 ENCOUNTER — APPOINTMENT (OUTPATIENT)
Dept: ORTHOPEDIC SURGERY | Facility: CLINIC | Age: 62
End: 2022-04-08
Payer: COMMERCIAL

## 2022-04-08 PROCEDURE — 73521 X-RAY EXAM HIPS BI 2 VIEWS: CPT

## 2022-04-08 PROCEDURE — 99214 OFFICE O/P EST MOD 30 MIN: CPT

## 2022-04-08 RX ORDER — DICLOFENAC SODIUM 50 MG/1
50 TABLET, DELAYED RELEASE ORAL
Qty: 60 | Refills: 1 | Status: ACTIVE | COMMUNITY
Start: 2022-04-08 | End: 1900-01-01

## 2022-04-08 NOTE — HISTORY OF PRESENT ILLNESS
[de-identified] : CC L hip\par \par HPI 59 yo female presents for post lumbar spine surgery fu of acute onset of 8 months of acute related and constant pain in the lateral greater than groin left hip after a fall on the sidewalk. The pain is worse, and rated a 8 out of 10, described as sharp stabbing burning and throbbing, [without radiation]. Rest makes the pain better and walking makes the pain worse. The patient reports associated symptoms of left lower extremity weakness and difficulty walking. The patient - similar pain previously .\par \par \par Previous treatments include:\par Activity Modification	+\par Ice/Compression 	+\par NSAIDs  		+\par Physical Therapy 	+ formal pt hellped a lot more\par Cortisone Injection	+ troch mild help short term, hip joint 6-8 weeks good help\par Surgery  		-\par \par numbness gone\par severe groin pain L>R remains\par \par Review of Systems is positive for the above musculoskeletal symptoms and is otherwise non-contributory for general, constitutional, psychiatric, neurologic, HEENT, cardiac, respiratory, gastrointestinal, reproductive, lymphatic, and dermatologic complaints.\par \par Consult By Dr Loza

## 2022-04-08 NOTE — PHYSICAL EXAM
[de-identified] : Physical Examination\par General: well nourished, in no acute distress, alert and oriented to person, place and time\par Psychiatric: normal mood and affect, no abnormal movements or speech patterns\par Eyes: vision intact - glasses\par \par Musculoskeletal Examination\par Ambulation	+ antalgic gait, - cane assistive devices\par \par Hip			Right			Left\par General\par      Swelling/Deformity	normal			normal	\par      Skin			normal			normal\par      Erythema		-			-\par      Standing Alignment	neutral			neutral\par Range of Motion\par      Flexion		90			90\par      Abduction		30			30\par      Flex ER		45			45\par      Flex IR		15			15\par      Knee        		0 - 130			0 - 130\par Provocative Exam\par      Log Roll		-			-\par      Heel Strike		-			-\par      Fig 4			-			-\par      SLR			-			-\par Palpation\par      Public Symphysis	-			-\par      Groin   	 	+			+\par      Greater Trochanter	-			-\par      Piriformis		-			-\par      SI Joint		-			-\par Strength Examination/Atrophy\par      Hip Flexor		5+			5+\par      Quadriceps		5+			5+\par      Hamstring		5+			5+\par \par Sensation\par      Deep Peroneal        	normal			normal\par      Superficial Peroneal 	normal			normal\par      Sural  	 	normal			normal\par      Posterior Tibial        	normal			normal\par      Saphenous		normal			normal\par Pulse\par      DP			2+			2+\par  [de-identified] : 2 views of the affected L hip (AP and Frog Lateral )\par 6-29-21\par demonstrate:\par [normal] acetabular morphology\par decreased anterior headneck offset femoral head neck morphology\par no acetabular osteophytes \par no asymmetric joint space loss\par Spherical femoral head\par \par \par 2 views of the Bilateral hip (AP and Frog Lateral)\par were ordered, taken and evaluated by myself today and \par demonstrate:\par Right\par Questionable crossover sign acetabular morphology\par Mild anterior head neck offset decrease femoral head neck morphology\par no acetabular osteophytes\par no asymmetric joint space loss\par [Spherical] femoral head \par \par Left\par Questionable crossover sign acetabular morphology\par Mild anterior head neck offset decrease femoral head neck morphology\par no acetabular osteophytes\par no asymmetric joint space loss\par [Spherical] femoral head \par \par

## 2022-04-08 NOTE — DISCUSSION/SUMMARY
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

## 2022-04-17 ENCOUNTER — APPOINTMENT (OUTPATIENT)
Dept: MRI IMAGING | Facility: IMAGING CENTER | Age: 62
End: 2022-04-17
Payer: COMMERCIAL

## 2022-04-17 ENCOUNTER — OUTPATIENT (OUTPATIENT)
Dept: OUTPATIENT SERVICES | Facility: HOSPITAL | Age: 62
LOS: 1 days | End: 2022-04-17
Payer: COMMERCIAL

## 2022-04-17 DIAGNOSIS — Z98.891 HISTORY OF UTERINE SCAR FROM PREVIOUS SURGERY: Chronic | ICD-10-CM

## 2022-04-17 DIAGNOSIS — Z00.8 ENCOUNTER FOR OTHER GENERAL EXAMINATION: ICD-10-CM

## 2022-04-17 DIAGNOSIS — M54.2 CERVICALGIA: ICD-10-CM

## 2022-04-17 DIAGNOSIS — Z98.890 OTHER SPECIFIED POSTPROCEDURAL STATES: Chronic | ICD-10-CM

## 2022-04-17 DIAGNOSIS — Z98.51 TUBAL LIGATION STATUS: Chronic | ICD-10-CM

## 2022-04-17 DIAGNOSIS — Z98.49 CATARACT EXTRACTION STATUS, UNSPECIFIED EYE: Chronic | ICD-10-CM

## 2022-04-17 PROCEDURE — 72141 MRI NECK SPINE W/O DYE: CPT

## 2022-04-17 PROCEDURE — 72141 MRI NECK SPINE W/O DYE: CPT | Mod: 26

## 2022-04-19 ENCOUNTER — APPOINTMENT (OUTPATIENT)
Dept: ELECTROPHYSIOLOGY | Facility: CLINIC | Age: 62
End: 2022-04-19

## 2022-04-20 ENCOUNTER — APPOINTMENT (OUTPATIENT)
Dept: ORTHOPEDIC SURGERY | Facility: CLINIC | Age: 62
End: 2022-04-20
Payer: COMMERCIAL

## 2022-04-20 ENCOUNTER — FORM ENCOUNTER (OUTPATIENT)
Age: 62
End: 2022-04-20

## 2022-04-20 VITALS
BODY MASS INDEX: 25.43 KG/M2 | WEIGHT: 162 LBS | DIASTOLIC BLOOD PRESSURE: 76 MMHG | OXYGEN SATURATION: 98 % | HEART RATE: 73 BPM | HEIGHT: 67 IN | SYSTOLIC BLOOD PRESSURE: 129 MMHG

## 2022-04-20 DIAGNOSIS — M54.2 CERVICALGIA: ICD-10-CM

## 2022-04-20 PROCEDURE — 99214 OFFICE O/P EST MOD 30 MIN: CPT

## 2022-04-20 NOTE — HISTORY OF PRESENT ILLNESS
[de-identified] : This is a 61-year-old female that is now 6 months out from thoracic decompression fusion surgery for thoracic spondylotic myelopathy.  She is walking and she does feel like she is making progress in terms of her gait.  She is still having some residual issues with balance.  She denies any severe radicular type pain.  She does occasionally get some significant discomfort in her anterior thighs and anterior knee.  This is in particular worse at night.  She denies any bowel bladder dysfunction.  She denies any saddle anesthesia.  She does endorse some finger numbness and discomfort.  \par \par 03/21/22\par Today the patient states that she is still having some issues with pain in her groin.  She is walking but she is still having issues with balance.  She denies any bowel bladder issues.  She denies any saddle anesthesia.  She has been diligently working with physical therapy.\par \par 02/09/22\par Today the patient is now 3 months out from T10-T12 decompression with T10-T11 fusion.  As compared to prior to surgery she is doing well.  She is walking with a cane.  She does have nighttime tingling and discomfort in her lower extremities as well as groin pain.  She does still feel like her walk/gait is waddling.  She is working with physical therapy and she does feel like this does help.  She denies any bowel bladder issues.  She denies any saddle anesthesia.\par \par 12/27/21\par Today the patient is approximately 6 weeks out from T10-T12 decompression with T10-T11 fusion.  Overall she is doing very well.  She has had significant improvement in her strength.  She is walking better.  She is ready to increase her activities as well.  Today however she does still note some bilateral groin pain which can be significant at times.  She denies any bowel bladder issues.  She denies any saddle anesthesia.\par \par 11/29/21\par Today the patient is now approximately 3 weeks out from T10-T12 decompression with he was in T10-T11 spinal fusion.  Overall she is doing very well.  She is walking with a cane.  She feels like her lower extremity strength has improved significantly.  She denies any severe radicular type symptoms.  She denies any new numbness or tingling.  Overall she is pleased with her recovery today.\par \par 11/8/21\par Today the patient states that she is still dealing with significant amount of balance issues.  She is dealing with radicular type pain, right worse than left.  She denies any bowel bladder issues.  She denies any saddle anesthesia.  She is currently walking with a cane. \par \par 10/18/21\par Today patient states that she is still having significant issues in terms of her balance and her lower extremity pain.  She does state that she has radiating pain down her bilateral legs, left worse than right.  She also feels that she is much more unsteady on her feet the past several months.  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  She does endorse numbness and tingling in her abdomen today but this is not below saddle anesthesia.  She denies any bowel bladder incontinence.

## 2022-04-20 NOTE — PHYSICAL EXAM
[de-identified] : Lumbar Physical Exam\par \par Gait -the patient is walking  with a cane in the office\par \par Station - Normal\par \par Sagittal balance - Normal\par \par Compensatory mechanism? - None\par \par Reflexes\par Patellar - normal\par Gastroc - normal\par Clonus - No\par \par Hip Exam - Normal\par \par Straight leg raise - none\par \par Pulses - 2+ dp/pt\par \par Range of motion -reduced\par \par Sensation \par Sensation intact to light touch in L1, L2, L3, L4, L5 and S1 dermatomes bilaterally\par \par Motor\par 	IP	Quad	HS	TA	Gastroc	EHL\par Right	5/5	5/5	5/5	5/5	5/5	5/5\par Left	5/5	5/5	5/5	5/5	5/5	5/5\par \par The patient is strong in her bilateral lower extremities.  She has no neurologic deficits that are objectively able to be found\par Her sensation is intact in all dermatomes [de-identified] : Thoracic radiographs and lumbar radiographs reviewed\par No acute hardware complication\par Thoracolumbar alignment within normal limits\par \par Thoracic MRI reviewed\par No areas of critical central stenosis\par No areas of critical foraminal stenosis\par At the fusion site, decompression site there is no stenosis of the thoracic cord\par I do not note any myelomalacia\par Images are in [facility]\par \par Cervical MRI\par No areas of critical central stenosis\par There are areas of foraminal stenosis present

## 2022-04-20 NOTE — ASSESSMENT
[FreeTextEntry1] : I had a long discussion with the patient regards to her treatment plan and diagnosis.  I think she should maximize treatment in terms of physical therapy and work-up by her neurologist for her symptoms.  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  I think she should continue to work to her regain strength in her lower extremities and also to help with gait training and balance exercises.  I will see her back in 6 weeks time.  I did encourage her to reach out to me at any time if her symptoms worsen or change in any way

## 2022-04-22 ENCOUNTER — NON-APPOINTMENT (OUTPATIENT)
Age: 62
End: 2022-04-22

## 2022-04-25 ENCOUNTER — NON-APPOINTMENT (OUTPATIENT)
Age: 62
End: 2022-04-25

## 2022-04-26 ENCOUNTER — APPOINTMENT (OUTPATIENT)
Dept: RADIOLOGY | Facility: CLINIC | Age: 62
End: 2022-04-26
Payer: COMMERCIAL

## 2022-04-26 ENCOUNTER — OUTPATIENT (OUTPATIENT)
Dept: OUTPATIENT SERVICES | Facility: HOSPITAL | Age: 62
LOS: 1 days | End: 2022-04-26
Payer: COMMERCIAL

## 2022-04-26 ENCOUNTER — APPOINTMENT (OUTPATIENT)
Dept: MRI IMAGING | Facility: CLINIC | Age: 62
End: 2022-04-26
Payer: COMMERCIAL

## 2022-04-26 DIAGNOSIS — M25.551 PAIN IN RIGHT HIP: ICD-10-CM

## 2022-04-26 DIAGNOSIS — Z98.51 TUBAL LIGATION STATUS: Chronic | ICD-10-CM

## 2022-04-26 DIAGNOSIS — Z98.49 CATARACT EXTRACTION STATUS, UNSPECIFIED EYE: Chronic | ICD-10-CM

## 2022-04-26 DIAGNOSIS — Z98.891 HISTORY OF UTERINE SCAR FROM PREVIOUS SURGERY: Chronic | ICD-10-CM

## 2022-04-26 DIAGNOSIS — Z98.890 OTHER SPECIFIED POSTPROCEDURAL STATES: Chronic | ICD-10-CM

## 2022-04-26 PROCEDURE — 73722 MRI JOINT OF LWR EXTR W/DYE: CPT

## 2022-04-26 PROCEDURE — 27093 INJECTION FOR HIP X-RAY: CPT | Mod: LT

## 2022-04-26 PROCEDURE — 77002 NEEDLE LOCALIZATION BY XRAY: CPT | Mod: 26

## 2022-04-26 PROCEDURE — 73722 MRI JOINT OF LWR EXTR W/DYE: CPT | Mod: 26,LT

## 2022-04-26 PROCEDURE — 77002 NEEDLE LOCALIZATION BY XRAY: CPT

## 2022-04-26 PROCEDURE — 27093 INJECTION FOR HIP X-RAY: CPT

## 2022-05-03 ENCOUNTER — APPOINTMENT (OUTPATIENT)
Dept: ORTHOPEDIC SURGERY | Facility: CLINIC | Age: 62
End: 2022-05-03
Payer: COMMERCIAL

## 2022-05-03 DIAGNOSIS — M25.852 OTHER SPECIFIED JOINT DISORDERS, RIGHT HIP: ICD-10-CM

## 2022-05-03 DIAGNOSIS — S73.192A OTHER SPRAIN OF LEFT HIP, INITIAL ENCOUNTER: ICD-10-CM

## 2022-05-03 DIAGNOSIS — M25.851 OTHER SPECIFIED JOINT DISORDERS, RIGHT HIP: ICD-10-CM

## 2022-05-03 PROCEDURE — 20611 DRAIN/INJ JOINT/BURSA W/US: CPT | Mod: LT

## 2022-05-03 PROCEDURE — 99214 OFFICE O/P EST MOD 30 MIN: CPT | Mod: 25

## 2022-05-03 RX ORDER — DICLOFENAC SODIUM 50 MG/1
50 TABLET, DELAYED RELEASE ORAL
Qty: 60 | Refills: 1 | Status: ACTIVE | COMMUNITY
Start: 2022-05-03 | End: 1900-01-01

## 2022-05-03 NOTE — DISCUSSION/SUMMARY
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 Potential adverse effects were discussed including but not limited to bleeding, skin/ joint infection, local skin reactions including bleaching, bruising, stiffness, soreness, vasovagal episodes, transient hyperglycemia, avascular necrosis, pseudo-septic type reactions, post injection joint pain, allergic reaction to product or anesthetic and other rare but potential adverse effects along with benefits including decreased pain and improved stability prior to obtaining verbal informed consent. It was also discussed that for some patients the treatment is ineffective and there are no guarantees that the patient will experience improvement as the result of the injection. In rare occasions the injection can cause worsening of pain.\par \par The use of a Sonosite 5-2 MHz curvilinear transducer with live ultrasound guidance of the hip injection was necessary given the patient's BMI and local body habitus overlying and obscuring the identification of normal body bony anatomy used to identify the injection site and the depth of soft tissue envelope necessitating a longer than normal needle to reach the joint space, and to confirm the location of the needle tip and intra-articular delivery of the medication. Without the use of live ultrasound guidance the injection would have been more difficult and place the patient's neurovascular structures at risk from the longer needle needed to traverse the soft tissue envelope.\par \par The anterolateral injection site was identified using the ultrasound probe to identify the femoral head and anterior neck longitudinally along the femoral neck axis. The injection site was marked and prepped with a ChloraPrep swab and anesthetized with ethylchloride skin anesthesia. Using sterile technique a 20g 3 1/2 in spinal needle with 3 cc total of 1cc 1% lidocaine without epinephrine, 1cc 0.25% Marcaine without epinephrine and 1cc of 40mg/mL Kenalog was passed through the injection site towards the intra-capsular portion of the anterior femoral neck. After penetrating the joint capsule, the medication was injected without resistance under live ultrasound visualization and noted to distend the joint capsule. The injection site was sterilely dressed, there was minimal blood loss. The patient tolerated this procedure without any complications done by myself. Images were recorded and saved.\par \par The patient has been advised that if they notice any worsening of symptoms or any problems to contact me and seek care from a qualified medical professional. The patient was instructed to ice the hip and take NSAID medication on an as needed basis if the patient feels discomfort.\par \par The patient was prescribed Diclofenac PO non-steroidal anti-inflammatory medication. 50 or 75mg tablets twice daily to be taken for at least 1-2 weeks in a row and then PRN afterwards. Risks and benefits were discussed and include but not limited to renal damage and GI ulceration and bleeding.  They were advised to take with food to limit stomach upset as well as warned to stop the medication if worsening gastric pain or dizziness or other side effects. Also to immediately stop the medication and seek appropriate medical attention if any severe stomach ache, gastritis, black/red vomit, black/red stools or any other medical concern.\par \par \par Physical therapy

## 2022-05-03 NOTE — PHYSICAL EXAM
[de-identified] : Physical Examination\par General: well nourished, in no acute distress, alert and oriented to person, place and time\par Psychiatric: normal mood and affect, no abnormal movements or speech patterns\par Eyes: vision intact - glasses\par \par Musculoskeletal Examination\par Ambulation	+ antalgic gait, - cane assistive devices\par \par Hip			Right			Left\par General\par      Swelling/Deformity	normal			normal	\par      Skin			normal			normal\par      Erythema		-			-\par      Standing Alignment	neutral			neutral\par Range of Motion\par      Flexion		90			90\par      Abduction		30			30\par      Flex ER		45			45\par      Flex IR		15			15\par      Knee        		0 - 130			0 - 130\par Provocative Exam\par      Log Roll		-			-\par      Heel Strike		-			-\par      Fig 4			-			-\par      SLR			-			-\par Palpation\par      Public Symphysis	-			-\par      Groin   	 	+			+\par      Greater Trochanter	-			-\par      Piriformis		-			-\par      SI Joint		-			-\par Strength Examination/Atrophy\par      Hip Flexor		5+			5+\par      Quadriceps		5+			5+\par      Hamstring		5+			5+\par \par Sensation\par      Deep Peroneal        	normal			normal\par      Superficial Peroneal 	normal			normal\par      Sural  	 	normal			normal\par      Posterior Tibial        	normal			normal\par      Saphenous		normal			normal\par Pulse\par      DP			2+			2+\par  [de-identified] : 2 views of the affected L hip (AP and Frog Lateral )\par 6-29-21\par demonstrate:\par [normal] acetabular morphology\par decreased anterior headneck offset femoral head neck morphology\par no acetabular osteophytes \par no asymmetric joint space loss\par Spherical femoral head\par \par \par 2 views of the Bilateral hip (AP and Frog Lateral)\par 4-2-22\par demonstrate:\par Right\par Questionable crossover sign acetabular morphology\par Mild anterior head neck offset decrease femoral head neck morphology\par no acetabular osteophytes\par no asymmetric joint space loss\par [Spherical] femoral head \par \par Left\par Questionable crossover sign acetabular morphology\par Mild anterior head neck offset decrease femoral head neck morphology\par no acetabular osteophytes\par no asymmetric joint space loss\par [Spherical] femoral head \par \par \par MRI Left hip with arthrogram from [facility] on 4-\par My impression of the images:\par Quality of the MRI is good\par Fraying of the anterior inferior aspect of the labrum\par Mild chondral thinning and fissuring of the weightbearing surface of the acetabulum and femoral head\par No evidence of displaced or full tear of the labrum\par \par The Final Radiologist Impression:\par \par LABRUM: There is nondisplaced undersurface partial tearing of the anterosuperior labrum.\par HYALINE CARTILAGE AND SUBCHONDRAL BONE: There is minimal surface chondral fissuring of the cartilage at the anterosuperior aspect of the hip joint.\par JOINT MORPHOLOGY: Normal.\par MUSCLE AND TENDONS: There is mild insertional tendinosis of the gluteus minimus with minimal partial tearing and mild peritendinous soft tissue edema.\par SYNOVIUM/JOINT FLUID: The joint is distended with injected fluid and contrast. There is mild greater trochanteric bursitis.\par BONE MARROW: No acute fracture, stress reaction, or osteonecrosis.\par NEUROVASCULAR STRUCTURES: Unremarkable. The fat plane surrounding sciatic nerve is preserved.\par INTRAPELVIC AND PERIPHERAL SOFT TISSUES: Fibroid uterus. Small pelvic free fluid.\par \par IMPRESSION:\par 1. Undersurface tearing of the anterosuperior labrum with minimal chondral thinning at the anterior aspect of the articulation.\par 2. Mild insertional gluteus minimus tendinosis and partial tearing with mild greater trochanteric bursitis..

## 2022-05-03 NOTE — HISTORY OF PRESENT ILLNESS
[de-identified] : CC L hip\par \par HPI 61 yo female presents for post lumbar spine surgery left hip mri  fu of acute onset of 8 months of acute related and constant pain in the lateral greater than groin left hip after a fall on the sidewalk. The pain is worse, and rated a 8 out of 10, described as sharp stabbing burning and throbbing, [without radiation]. Rest makes the pain better and walking makes the pain worse. The patient reports associated symptoms of left lower extremity weakness and difficulty walking. The patient - similar pain previously .\par \par \par Previous treatments include:\par Activity Modification	+\par Ice/Compression 	+\par NSAIDs  		+\par Physical Therapy 	+ formal pt hellped a lot more\par Cortisone Injection	+ troch mild help short term, hip joint 6-8 weeks good help\par Surgery  		-\par \par numbness gone\par severe groin pain L>R remains\par \par Review of Systems is positive for the above musculoskeletal symptoms and is otherwise non-contributory for general, constitutional, psychiatric, neurologic, HEENT, cardiac, respiratory, gastrointestinal, reproductive, lymphatic, and dermatologic complaints.\par \par Consult By Dr Loza

## 2022-05-25 ENCOUNTER — APPOINTMENT (OUTPATIENT)
Dept: ELECTROPHYSIOLOGY | Facility: CLINIC | Age: 62
End: 2022-05-25

## 2022-05-31 ENCOUNTER — FORM ENCOUNTER (OUTPATIENT)
Age: 62
End: 2022-05-31

## 2022-06-01 ENCOUNTER — APPOINTMENT (OUTPATIENT)
Dept: ORTHOPEDIC SURGERY | Facility: CLINIC | Age: 62
End: 2022-06-01

## 2022-06-22 NOTE — ED ADULT NURSE NOTE - OBJECTIVE STATEMENT
Tendon Origin: R elbow    Date/Time: 6/22/2022 8:00 AM  Performed by: Rodney Issa MD  Authorized by: Rodney Issa MD     Consent Done?:  Yes (Verbal)  Timeout: prior to procedure the correct patient, procedure, and site was verified    Indications:  Pain  Timeout: prior to procedure the correct patient, procedure, and site was verified    Location:  Elbow  Site:  R elbow  Prep: patient was prepped and draped in usual sterile fashion    Ultrasonic Guidance for Needle Placement?: No    Needle size:  25 G  Approach:  Anterolateral  Medications:  40 mg triamcinolone acetonide 40 mg/mL       Pt received A&Ox3, c/o slight chest discomfort, lightheadedness and headache. Pt said chest discomfort resolved, only feeling light headedness currently. Hx of MI, CHF, pericardial effusion. Pt states she was on Facetime with her sister when her sister noticed her speech was becoming slurred. Slight slurred speech currently noted. No facial droop. Equal Bilateral strength in extremeties. Denies any SOB, current CP, n/v/d, abd pain. Labs drawn and sent. Pt sent to CT scan. Will continue to monitor,.

## 2022-06-29 ENCOUNTER — APPOINTMENT (OUTPATIENT)
Dept: ELECTROPHYSIOLOGY | Facility: CLINIC | Age: 62
End: 2022-06-29

## 2022-08-03 ENCOUNTER — APPOINTMENT (OUTPATIENT)
Dept: ELECTROPHYSIOLOGY | Facility: CLINIC | Age: 62
End: 2022-08-03

## 2022-08-03 ENCOUNTER — FORM ENCOUNTER (OUTPATIENT)
Age: 62
End: 2022-08-03

## 2022-08-23 ENCOUNTER — APPOINTMENT (OUTPATIENT)
Dept: ELECTROPHYSIOLOGY | Facility: CLINIC | Age: 62
End: 2022-08-23

## 2022-08-23 ENCOUNTER — NON-APPOINTMENT (OUTPATIENT)
Age: 62
End: 2022-08-23

## 2022-08-23 PROCEDURE — G2066: CPT

## 2022-08-23 PROCEDURE — 93298 REM INTERROG DEV EVAL SCRMS: CPT

## 2022-08-25 ENCOUNTER — NON-APPOINTMENT (OUTPATIENT)
Age: 62
End: 2022-08-25

## 2022-09-27 ENCOUNTER — APPOINTMENT (OUTPATIENT)
Dept: ELECTROPHYSIOLOGY | Facility: CLINIC | Age: 62
End: 2022-09-27

## 2022-09-28 ENCOUNTER — APPOINTMENT (OUTPATIENT)
Dept: UROLOGY | Facility: CLINIC | Age: 62
End: 2022-09-28

## 2022-09-28 VITALS
SYSTOLIC BLOOD PRESSURE: 129 MMHG | HEIGHT: 67 IN | BODY MASS INDEX: 25.43 KG/M2 | RESPIRATION RATE: 17 BRPM | TEMPERATURE: 98.2 F | HEART RATE: 66 BPM | DIASTOLIC BLOOD PRESSURE: 70 MMHG | WEIGHT: 162 LBS

## 2022-09-28 PROCEDURE — 99214 OFFICE O/P EST MOD 30 MIN: CPT

## 2022-09-28 NOTE — HISTORY OF PRESENT ILLNESS
[FreeTextEntry1] : patietn with hx of spinal surgery \par Hx of spinal stenosis and  INC c/o \par fount to have microhematuria and eval with LINDSEY and cysto :negative ( aug 2021) \par follow up urine with PCP ( April 2022)" 6-10 red cells \par no new c/o \par reports frequency but admits to increased fluids \par no nataliia \par some inc en route to bathroom\par moblizing better with cane \par weight loss due to healthy diet \par no vag bleeding \par sexually active but no currently as partner in [place] \par here for f/u :

## 2022-09-28 NOTE — ASSESSMENT
[FreeTextEntry1] : patietn with hx of spinal surgery \par Hx of spinal stenosis and  INC c/o \par fount to have microhematuria and eval with LINDSEY and cysto :negative ( aug 2021) \par follow up urine with PCP ( April 2022)" 6-10 red cells \par no new c/o \par no nataliia \par some inc en route to bathroom\par moblizing better with cane \par weight loss due to healthy diet \par no vag bleeding \par sexually active but no currently as partner in [place] \par here for f/u : \par 1- check ua and cx\par 2- cont annual f/u unless above abnl - discussed re- eval if cont blood after 3- 4 years

## 2022-09-28 NOTE — PHYSICAL EXAM
[General Appearance - Well Developed] : well developed [General Appearance - Well Nourished] : well nourished [Normal Appearance] : normal appearance [Well Groomed] : well groomed [General Appearance - In No Acute Distress] : no acute distress [Abdomen Soft] : soft [Abdomen Tenderness] : non-tender [] : no hepato-splenomegaly [Abdomen Mass (___ Cm)] : no abdominal mass palpated [Abdomen Hernia] : no hernia was discovered [FreeTextEntry1] : pvr- zero

## 2022-09-30 LAB
APPEARANCE: ABNORMAL
BACTERIA UR CULT: NORMAL
BACTERIA: NEGATIVE
BILIRUBIN URINE: NEGATIVE
BLOOD URINE: ABNORMAL
COLOR: YELLOW
GLUCOSE QUALITATIVE U: NEGATIVE
HYALINE CASTS: 5 /LPF
KETONES URINE: NEGATIVE
LEUKOCYTE ESTERASE URINE: NEGATIVE
MICROSCOPIC-UA: NORMAL
NITRITE URINE: NEGATIVE
PH URINE: 6
PROTEIN URINE: NORMAL
RED BLOOD CELLS URINE: 3 /HPF
SPECIFIC GRAVITY URINE: 1.03
SQUAMOUS EPITHELIAL CELLS: 7 /HPF
UROBILINOGEN URINE: NORMAL
WHITE BLOOD CELLS URINE: 1 /HPF

## 2022-09-30 NOTE — PATIENT PROFILE ADULT - FUNCTIONAL SCREEN CURRENT LEVEL: SWALLOWING (IF SCORE 2 OR MORE FOR ANY ITEM, CONSULT REHAB SERVICES), MLM)
Pt appears significantly improved. Not labile or excitable. Mood still quickly becomes sad and tearful when thinking of losses (e.g., her aunt) especially as holidays are near. Her affect is more natural and full range. She is perseverating much less and is future-oriented. She denies med side effects from resumption of carbamazepine. Denies suicidality. MADRS: = 15 (mild depression). 0 = swallows foods/liquids without difficulty

## 2022-10-10 ENCOUNTER — APPOINTMENT (OUTPATIENT)
Dept: ELECTROPHYSIOLOGY | Facility: CLINIC | Age: 62
End: 2022-10-10

## 2022-10-10 VITALS
BODY MASS INDEX: 26.37 KG/M2 | WEIGHT: 168 LBS | HEART RATE: 55 BPM | DIASTOLIC BLOOD PRESSURE: 71 MMHG | OXYGEN SATURATION: 98 % | SYSTOLIC BLOOD PRESSURE: 129 MMHG | HEIGHT: 67 IN

## 2022-10-10 DIAGNOSIS — E78.00 PURE HYPERCHOLESTEROLEMIA, UNSPECIFIED: ICD-10-CM

## 2022-10-10 DIAGNOSIS — I10 ESSENTIAL (PRIMARY) HYPERTENSION: ICD-10-CM

## 2022-10-10 DIAGNOSIS — I47.1 SUPRAVENTRICULAR TACHYCARDIA: ICD-10-CM

## 2022-10-10 PROCEDURE — 93000 ELECTROCARDIOGRAM COMPLETE: CPT

## 2022-10-10 PROCEDURE — 99215 OFFICE O/P EST HI 40 MIN: CPT | Mod: 25

## 2022-10-10 RX ORDER — MONTELUKAST 10 MG/1
10 TABLET, FILM COATED ORAL
Qty: 30 | Refills: 0 | Status: ACTIVE | COMMUNITY
Start: 2022-08-30

## 2022-10-10 RX ORDER — ALBUTEROL SULFATE 2.5 MG/3ML
(2.5 MG/3ML) SOLUTION RESPIRATORY (INHALATION)
Qty: 75 | Refills: 0 | Status: ACTIVE | COMMUNITY
Start: 2022-07-05

## 2022-10-11 ENCOUNTER — NON-APPOINTMENT (OUTPATIENT)
Age: 62
End: 2022-10-11

## 2022-10-11 NOTE — DISCUSSION/SUMMARY
[EKG obtained to assist in diagnosis and management of assessed problem(s)] : EKG obtained to assist in diagnosis and management of assessed problem(s) [FreeTextEntry1] : Impression:\par \par 1. PAT: EKG performed today to assess for presence of tachyarrhythmias and reveals sinus bradycardia. Last ECHO with LVEF 50%. Pending cardiac MRI. ILR in place with no recent AT noted. Had 2 min of AT back in 2019, on metoprolol. ILR now at EOS. Discussed explant vs re implant of device. Wishes to have a new ILR implanted. Risks, benefits, and alternatives discussed. \par \par 2. HTN: resume oral antihypertensives as prescribed. Encouraged heart healthy diet, sodium restriction, and weight loss. Continue regular f/u with Cardiologist for further HTN management.\par \par 3. HLD: resume statin therapy as prescribed and regular f/u with Cardiologist for routine lipid monitoring and management.\par \par Plan for ILR explant and re Implant- to be followed by Dr. Galloway.

## 2022-10-11 NOTE — CARDIOLOGY SUMMARY
[de-identified] : 11/5/2021: LVEF 50% \par \par 11/20/2018, 1. Normal trileaflet aortic valve.2. Moderate concentric left ventricular hypertrophy.3. Low normal left ventricular systolic function. Nosegmental wall motion abnormalities. Septal motion isconsistent with LBBB.4. Normal right ventricular size and function.5. Agitated saline injection demonstrates no obciousevidence of a patent foramen ovale.6. Normal pericardium with trace pericardial effusion. LVEF 51%. \par

## 2022-10-11 NOTE — HISTORY OF PRESENT ILLNESS
[FreeTextEntry1] : Coco Mendoza is a 60y/o woman with Hx of HTN, CAD s/p MI (2010), asthma, NICM, LVEF 50%, and TIA s/p ILR placement (2018) who presents today for initial evaluation as ILR is now at EOS. Admits doing well with no cardiac issues or complaints. Denies chest pain, palpitations, SOB, syncope or near syncope. ILR with no evidence of afib. Saw Dr. Galloway prior to appointment who wishes to have ILR re implanted for continued monitoring. ECHO was done with LVEF 50% as per patient. Pending Cardiac MRI next week.

## 2022-11-02 NOTE — DISCHARGE NOTE NURSING/CASE MANAGEMENT/SOCIAL WORK - NSDCPEFALRISK_GEN_ALL_CORE
[FreeTextEntry1] : INTERIM HX 11/02/2022: MRI brain and cervical w/w/o 9/21/2022 normal. Symptoms are much better, continues to have mild paresthesias (80% better) over entire face (sabra, cheeks) and back, worse when stressed. Since last visit he has had 2-3 headaches, taking supplements. \par --------------------------------\par HPI (initial visit Sep 07, 2022)-Cherelle is a 23 year old RH male w/ of GERD , here for evaluation of sensory disturbances over left hemibody.\par \par Around June 2021- started having tingling in left hand over pinky and hypothenar eminence. 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 Spoke to a family doctor and told he had pinched a nerve and recommended avoiding too much pressure on hand- this improved his symptoms. Experienced similar symptoms in right hand, though mild and not bothersome.  \par Around Dec 2021, started to get tingly over back of left leg- over calf, this would happen at rest and not necessarily with activity. It does not involve his foot. It is intermittent.\par  Since 8/4/2022 began to experience tingling over left half of face, this was constant, and now improved-"Feels like goose bumps and gets worse when excited". Also experiences some "goose bumps" over left shoulder and left thigh- heightens when watching a good movie or playing video games.  He again spoke to family doctor and suggested stopping famotidine and this seemed to help.\par \par He does experience headaches when working out, not eating or sleeping well. He describes unilateral headaches (mostly left sided). More frequently lately, more sporadic in the past. NEeds to take OTC medications (Tylenol) now. + Photophobia. No nausea or vomiting. No vision changes. Duration: a few hours. Sister and mother have migraines. Not sure if tingling occurs with headaches. \par \par "Sleep is really bad". Since COVID has been experiencing some depression. Father had a cardiac arrest and that "was the worst experience I ever had". This caused him anxiety. He changed his routines and staying up more at night and plays video games. He is now working on sleep schedule. He has lost his motivation drive. His school performance worsened around cOVID and after father had cardiac arrest (needed CPR).\par \par Labs from PCP reviewed 8/22/022- TSH, CMP, CMP, Lyme, TEO negative, HbA1c. \par No hx of COVID infection.\par \par \par \par 
For information on Fall & injury Prevention, visit [email or web address]

## 2022-12-07 ENCOUNTER — APPOINTMENT (OUTPATIENT)
Dept: ELECTROPHYSIOLOGY | Facility: CLINIC | Age: 62
End: 2022-12-07

## 2023-02-02 ENCOUNTER — APPOINTMENT (OUTPATIENT)
Dept: ORTHOPEDIC SURGERY | Facility: CLINIC | Age: 63
End: 2023-02-02
Payer: COMMERCIAL

## 2023-02-02 ENCOUNTER — OUTPATIENT (OUTPATIENT)
Dept: OUTPATIENT SERVICES | Facility: HOSPITAL | Age: 63
LOS: 1 days | End: 2023-02-02
Payer: COMMERCIAL

## 2023-02-02 ENCOUNTER — APPOINTMENT (OUTPATIENT)
Dept: ULTRASOUND IMAGING | Facility: CLINIC | Age: 63
End: 2023-02-02
Payer: COMMERCIAL

## 2023-02-02 ENCOUNTER — NON-APPOINTMENT (OUTPATIENT)
Age: 63
End: 2023-02-02

## 2023-02-02 VITALS
BODY MASS INDEX: 26.53 KG/M2 | DIASTOLIC BLOOD PRESSURE: 78 MMHG | WEIGHT: 169 LBS | HEIGHT: 67 IN | HEART RATE: 61 BPM | SYSTOLIC BLOOD PRESSURE: 132 MMHG | OXYGEN SATURATION: 97 %

## 2023-02-02 DIAGNOSIS — M79.89 OTHER SPECIFIED SOFT TISSUE DISORDERS: ICD-10-CM

## 2023-02-02 DIAGNOSIS — Z98.51 TUBAL LIGATION STATUS: Chronic | ICD-10-CM

## 2023-02-02 DIAGNOSIS — M25.551 PAIN IN RIGHT HIP: ICD-10-CM

## 2023-02-02 DIAGNOSIS — Z98.49 CATARACT EXTRACTION STATUS, UNSPECIFIED EYE: Chronic | ICD-10-CM

## 2023-02-02 DIAGNOSIS — M25.561 PAIN IN RIGHT KNEE: ICD-10-CM

## 2023-02-02 DIAGNOSIS — Z98.891 HISTORY OF UTERINE SCAR FROM PREVIOUS SURGERY: Chronic | ICD-10-CM

## 2023-02-02 DIAGNOSIS — M25.552 PAIN IN RIGHT HIP: ICD-10-CM

## 2023-02-02 DIAGNOSIS — Z98.890 OTHER SPECIFIED POSTPROCEDURAL STATES: Chronic | ICD-10-CM

## 2023-02-02 PROCEDURE — 72170 X-RAY EXAM OF PELVIS: CPT

## 2023-02-02 PROCEDURE — 93970 EXTREMITY STUDY: CPT | Mod: 26

## 2023-02-02 PROCEDURE — 93970 EXTREMITY STUDY: CPT

## 2023-02-02 PROCEDURE — 73564 X-RAY EXAM KNEE 4 OR MORE: CPT | Mod: RT

## 2023-02-02 PROCEDURE — 99213 OFFICE O/P EST LOW 20 MIN: CPT

## 2023-02-05 PROBLEM — M25.551 BILATERAL HIP PAIN: Status: ACTIVE | Noted: 2022-03-09

## 2023-02-05 PROBLEM — M79.89 CALF SWELLING: Status: ACTIVE | Noted: 2023-02-02

## 2023-02-05 PROBLEM — M25.561 RIGHT KNEE PAIN: Status: ACTIVE | Noted: 2023-02-01

## 2023-02-05 NOTE — REVIEW OF SYSTEMS
[Joint Stiffness] : no joint stiffness [Joint Swelling] : no joint swelling [Fever] : no fever [Chills] : no chills [FreeTextEntry5] : HTN, LBBB, CAD s/p MI (7/2012), CHF [de-identified] : CVA (11/18/2018), TIA

## 2023-02-05 NOTE — HISTORY OF PRESENT ILLNESS
[de-identified] : Ms. Danielson is a 62-year-old female present to the office for evaluation of her right knee pain.  Patient has been experiencing right knee pain since a twisting injury on 1/24/2023.  Pain is located over the posterolateral knee and is worse with sitting or walking.  Pain is improved with rest/elevation.  She has tried Tylenol, with mild relief.  She has also tried oxycodone and tramadol.  Patient has a history of a left hip labral tear and bilateral hip/groin pain.  She also has a history of a CVA with residual left leg weakness.  She is now not putting as much weight on the right leg, causing the left knee to occasionally buckle.  Patient is also noted some ankle/foot swelling.  She reports some calf pain when she walks and is wearing compression socks at this time.  Patient has a history of CHF.  No fevers or chills.  Patient has a history of a lumbar fusion by Dr. Salazar.  She currently walks with a cane.

## 2023-02-05 NOTE — PHYSICAL EXAM
[de-identified] : Constitutional: 62 year old female, alert and oriented, cooperative, in no acute distress.\par \par HEENT \par NC/AT.  Appearance: symmetric\par \par Neck/Back\par Straight without deformity or instability.  Good ROM.\par \par Chest/Respiratory \par Respiratory effort: no intercostal retractions or use of accessory muscles. Nonlabored Breathing\par \par Skin \par On inspection, warm and dry without rashes or lesions.\par \par Mental Status: \par Judgment, insight: intact\par Orientation: oriented to time, place, and person\par \par Neurological:\par Sensory and Motor are grossly intact throughout\par \par Right Knee\par \par Inspection:\par     Skin intact, no rashes or lesions\par     No Effusion\par     Non-tender to palpation over tibial tubercle, patella, medial and lateral joint line, and pes insertion.\par     No significant calf tenderness\par     Mild calf/ankle swelling on right compared to left\par    No calf pain with forced dorsiflexion of the ankle\par \par Range of Motion:\par 	Extension - 0 degrees\par 	Flexion - 110 degrees\par 	Extensor lag: None\par \par Stability:\par      Demonstrates no Varus or Valgus instability\par      Negative Anterior or Posterior drawer.\par      Negative Lachman's\par      Negative McMurrays\par \par Patella: stable, tracks well. \par \par Neurologic Exam\par     Motor intact including 5/5 Extensor Hallucis Longus, 5/5 Flexor Hallucis Longus, 5/5 Tibialis Anterior and 5/5 Gastrocnemius\par     Sensation Intact to Light Touch including Saphenous, Sural, Superficial Peroneal, Deep Peroneal, Tibial nerve distributions\par     Slightly weaker on the left leg compared to the right\par \par Vascular Exam\par     Foot is warm and well perfused with 2+ Dorsalis Pedis Pulse \par \par No pain with range of motion of the bilateral hips or left knee. No lumbar paraspinal muscle tenderness.  [de-identified] : XRay:  XRays of the Pelvis (1 View) taken in the office today and discussed with the patient. XRays demonstrate no obvious fracture or dislocation. There is no significant evidence of osteoarthritis or osteophyte formation. (my personal interpretation). \par \par XRay: XRays of the Right Knee (4 Views) taken in the office today and reviewed with the patient. XRays demonstrate joint space narrowing in the medial and patellofemoral compartments, consistent with mild osteoarthritis, KL Grade: 1. (my personal interpretation)

## 2023-02-05 NOTE — DISCUSSION/SUMMARY
[de-identified] : Ms. Danielson is a 62-year-old female presents the office for evaluation of her right knee pain.  Patient has been experiencing right knee pain since 1/24/2023.  Pain is located over the posterolateral knee.  X-rays showed mild right knee osteoarthritis.  Examination showed mild right calf/ankle swelling, but no tenderness.  There was slightly decreased right knee range of motion.  There is slightly decreased strength on the left leg compared to the right.  Discussed with the patient the examination and imaging findings.  Discussed with the patient the potential etiologies of her knee pain including, but not limited to, right knee osteoarthritis, right knee sprain, right knee strain, meniscal injury, and ligamentous injury.  Discussed with patient the management of her knee pain, including physical therapy, anti-inflammatories, and injections.  Patient remains concerned that her leg swelling/calf pain is secondary to a DVT.  Discussed that based on patient's physical exam findings and history, DVT is less likely at this time.  However, patient has upcoming flights and would like to rule out DVT with an ultrasound duplex.  Ultrasound duplex was ordered.  Discussed that if ultrasound duplex is negative, will give patient referral to physical therapy.  Patient will take over-the-counter anti-inflammatories as needed.  Patient will follow-up in 4 to 6 weeks, pending results of the ultrasound duplex.  Patient understanding and in agreement with the plan.  All questions answered.\par \par Plan:\par -Ultrasound duplex of bilateral lower extremities\par -If ultrasound duplex shows no DVT, will refer to physical therapy\par -Over-the-counter anti-inflammatories as needed\par -Follow-up in 4 to 6 weeks, pending results of the ultrasound duplex

## 2023-04-28 ENCOUNTER — APPOINTMENT (OUTPATIENT)
Dept: ORTHOPEDIC SURGERY | Facility: CLINIC | Age: 63
End: 2023-04-28
Payer: COMMERCIAL

## 2023-04-28 VITALS — HEART RATE: 74 BPM | SYSTOLIC BLOOD PRESSURE: 146 MMHG | DIASTOLIC BLOOD PRESSURE: 74 MMHG

## 2023-04-28 DIAGNOSIS — M54.50 LOW BACK PAIN, UNSPECIFIED: ICD-10-CM

## 2023-04-28 PROCEDURE — 99214 OFFICE O/P EST MOD 30 MIN: CPT

## 2023-04-28 PROCEDURE — 72082 X-RAY EXAM ENTIRE SPI 2/3 VW: CPT

## 2023-04-28 RX ORDER — TIZANIDINE 2 MG/1
2 TABLET ORAL EVERY 6 HOURS
Qty: 24 | Refills: 0 | Status: ACTIVE | COMMUNITY
Start: 2023-04-28 | End: 1900-01-01

## 2023-04-28 RX ORDER — DICLOFENAC SODIUM 75 MG/1
75 TABLET, DELAYED RELEASE ORAL
Qty: 40 | Refills: 0 | Status: ACTIVE | COMMUNITY
Start: 2023-04-28 | End: 1900-01-01

## 2023-04-28 NOTE — PHYSICAL EXAM
[de-identified] : Lumbar Physical Exam\par \par Gait -the patient is walking  with a cane in the office\par \par Station - Normal\par \par Sagittal balance - Normal\par \par Compensatory mechanism? - None\par \par Reflexes\par Patellar - normal\par Gastroc - normal\par Clonus - No\par \par Hip Exam - Normal\par \par Straight leg raise - none\par \par Pulses - 2+ dp/pt\par \par Range of motion -reduced\par \par Sensation \par Sensation intact to light touch in L1, L2, L3, L4, L5 and S1 dermatomes bilaterally\par \par Motor\par 	IP	Quad	HS	TA	Gastroc	EHL\par Right	5/5	5/5	5/5	5/5	5/5	5/5\par Left	5/5	5/5	5/5	5/5	5/5	5/5\par \par The patient is strong in her bilateral lower extremities.  She has no neurologic deficits that are objectively able to be found\par Her sensation is intact in all dermatomes [de-identified] : Thoracic radiographs and lumbar radiographs reviewed\par No acute hardware complication\par Thoracolumbar alignment within normal limits\par \par Thoracic MRI reviewed\par No areas of critical central stenosis\par No areas of critical foraminal stenosis\par At the fusion site, decompression site there is no stenosis of the thoracic cord\par I do not note any myelomalacia\par Images are in  radiology\par \par Cervical MRI\par No areas of critical central stenosis\par There are areas of foraminal stenosis present\par \par lumbar and thoracic radiographs reviewed \par No acute hardware complication\par Thoracolumbar alignment within normal limits

## 2023-04-28 NOTE — ASSESSMENT
[FreeTextEntry1] : I had a long discussion with the patient regards to her treatment plan and diagnosis.  I think she should maximize treatment in terms of physical therapy and work-up by her neurologist for her symptoms.  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  I think she should continue to work to her regain strength in her lower extremities and also to help with gait training and balance exercises. She will continue taking cyclobenzaprine and diclofenac for pain. I will see her back in 2 weeks time.  I did encourage her to reach out to me at any time if her symptoms worsen or change in any way.

## 2023-04-28 NOTE — HISTORY OF PRESENT ILLNESS
[de-identified] : 62 year old female who presents for follow-up evaluation of her thoracic decompression fusion surgery for thoracic spondylotic myelopathy. Today, the patient reports she feels better relative to her previous appt. She reports she is experiencing back pain after pulling her back wrong while getting into an uber, that was so severe she had to take Oxycodone and Diclofenac. She does occasionally get some significant discomfort in her anterior thighs and anterior knee.  This is in particular worse at night. \par Denies any bowel/bladder incontinence. Denies any saddle anesthesia. \par \par 04/20/2022\par This is a 61-year-old female that is now 6 months out from thoracic decompression fusion surgery for thoracic spondylotic myelopathy.  She is walking and she does feel like she is making progress in terms of her gait.  She is still having some residual issues with balance.  She denies any severe radicular type pain.  She does occasionally get some significant discomfort in her anterior thighs and anterior knee.  This is in particular worse at night.  She denies any bowel bladder dysfunction.  She denies any saddle anesthesia.  She does endorse some finger numbness and discomfort.  \par \par 03/21/22\par Today the patient states that she is still having some issues with pain in her groin.  She is walking but she is still having issues with balance.  She denies any bowel bladder issues.  She denies any saddle anesthesia.  She has been diligently working with physical therapy.\par \par 02/09/22\par Today the patient is now 3 months out from T10-T12 decompression with T10-T11 fusion.  As compared to prior to surgery she is doing well.  She is walking with a cane.  She does have nighttime tingling and discomfort in her lower extremities as well as groin pain.  She does still feel like her walk/gait is waddling.  She is working with physical therapy and she does feel like this does help.  She denies any bowel bladder issues.  She denies any saddle anesthesia.\par \par 12/27/21\par Today the patient is approximately 6 weeks out from T10-T12 decompression with T10-T11 fusion.  Overall she is doing very well.  She has had significant improvement in her strength.  She is walking better.  She is ready to increase her activities as well.  Today however she does still note some bilateral groin pain which can be significant at times.  She denies any bowel bladder issues.  She denies any saddle anesthesia.\par \par 11/29/21\par Today the patient is now approximately 3 weeks out from T10-T12 decompression with he was in T10-T11 spinal fusion.  Overall she is doing very well.  She is walking with a cane.  She feels like her lower extremity strength has improved significantly.  She denies any severe radicular type symptoms.  She denies any new numbness or tingling.  Overall she is pleased with her recovery today.\par \par 11/8/21\par Today the patient states that she is still dealing with significant amount of balance issues.  She is dealing with radicular type pain, right worse than left.  She denies any bowel bladder issues.  She denies any saddle anesthesia.  She is currently walking with a cane. \par \par 10/18/21\par Today patient states that she is still having significant issues in terms of her balance and her lower extremity pain.  She does state that she has radiating pain down her bilateral legs, left worse than right.  She also feels that she is much more unsteady on her feet the past several months.  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  She does endorse numbness and tingling in her abdomen today but this is not below saddle anesthesia.  She denies any bowel bladder incontinence.

## 2023-04-28 NOTE — ADDENDUM
[FreeTextEntry1] : I, Mily Belle, acted solely as a scribe for Dr. Ben Salazar MD on this date 04/28/2023  \par \par All medical record entries made by the Scribe were at my, Dr. Ben Salazar MD., direction and personally dictated by me on 04/28/2023 . I have reviewed the chart and agree that the record accurately reflects my personal performance of the history, physical exam, assessment and plan. I have also personally directed, reviewed, and agreed with the chart.

## 2023-04-28 NOTE — REASON FOR VISIT
[Follow-Up Visit] : a follow-up visit for [Back Pain] : back pain [Radiculopathy] : radiculopathy [FreeTextEntry2] : post-op

## 2023-05-10 ENCOUNTER — APPOINTMENT (OUTPATIENT)
Dept: ORTHOPEDIC SURGERY | Facility: CLINIC | Age: 63
End: 2023-05-10

## 2023-06-22 NOTE — PRE-ANESTHESIA EVALUATION ADULT - NSANTHINDVINFOSD_GEN_ALL_CORE
Render Post-Care Instructions In Note?: no Show Aperture Variable?: Yes Number Of Freeze-Thaw Cycles: 2 freeze-thaw cycles patient Medical Necessity Clause: This procedure was medically necessary because the lesions that were treated were: Duration Of Freeze Thaw-Cycle (Seconds): 5-10 Post-Care Instructions: I reviewed with the patient in detail post-care instructions. Patient is to wear sunprotection, and avoid picking at any of the treated lesions. Pt may apply Vaseline to crusted or scabbing areas. Detail Level: Detailed Medical Necessity Information: It is in your best interest to select a reason for this procedure from the list below. All of these items fulfill various CMS LCD requirements except the new and changing color options. Spray Paint Text: The liquid nitrogen was applied to the skin utilizing a spray paint frosting technique. Consent: The patient's consent was obtained including but not limited to risks of crusting, scabbing, blistering, scarring, darker or lighter pigmentary change, recurrence, incomplete removal and infection.

## 2023-08-25 ENCOUNTER — OUTPATIENT (OUTPATIENT)
Dept: OUTPATIENT SERVICES | Facility: HOSPITAL | Age: 63
LOS: 1 days | End: 2023-08-25
Payer: COMMERCIAL

## 2023-08-25 ENCOUNTER — APPOINTMENT (OUTPATIENT)
Dept: UROLOGY | Facility: CLINIC | Age: 63
End: 2023-08-25
Payer: COMMERCIAL

## 2023-08-25 VITALS
SYSTOLIC BLOOD PRESSURE: 120 MMHG | OXYGEN SATURATION: 98 % | HEIGHT: 67 IN | DIASTOLIC BLOOD PRESSURE: 73 MMHG | WEIGHT: 165 LBS | BODY MASS INDEX: 25.9 KG/M2 | HEART RATE: 63 BPM

## 2023-08-25 DIAGNOSIS — Z98.891 HISTORY OF UTERINE SCAR FROM PREVIOUS SURGERY: Chronic | ICD-10-CM

## 2023-08-25 DIAGNOSIS — Z98.890 OTHER SPECIFIED POSTPROCEDURAL STATES: Chronic | ICD-10-CM

## 2023-08-25 DIAGNOSIS — Z98.49 CATARACT EXTRACTION STATUS, UNSPECIFIED EYE: Chronic | ICD-10-CM

## 2023-08-25 DIAGNOSIS — Z98.51 TUBAL LIGATION STATUS: Chronic | ICD-10-CM

## 2023-08-25 PROCEDURE — 51701 INSERT BLADDER CATHETER: CPT

## 2023-08-25 PROCEDURE — 99214 OFFICE O/P EST MOD 30 MIN: CPT | Mod: 25

## 2023-08-25 NOTE — PHYSICAL EXAM
[General Appearance - Well Developed] : well developed [General Appearance - Well Nourished] : well nourished [Normal Appearance] : normal appearance [Well Groomed] : well groomed [General Appearance - In No Acute Distress] : no acute distress [Urethral Meatus] : normal urethra [External Female Genitalia] : normal external genitalia [FreeTextEntry1] : sc after sterile prep wiht 15 f cath andurine collected and cath removed itact  dagmar procedyre

## 2023-08-25 NOTE — ASSESSMENT
[FreeTextEntry1] : patient [facility] hx of microhematuria eval with cysto and LINDSEY 2021 : normal  denes any luts , occas INC if waits too long o void -- uses liners occas when oht of house no utis , no hemajria or dhsuria  weigh tstable  [place] new  in October lasturine explaied - 3 red cells in settin of epith cells  here for f/u urne : 1- repeat SC urine 2- copies of notes , cysto and LINDSEY to patient

## 2023-08-25 NOTE — HISTORY OF PRESENT ILLNESS
[FreeTextEntry1] : patient [facility] hx of microhematuria eval with cysto and LINDSEY 2021 : normal  denes any luts , occas INC if waits too long o void -- uses liners occas when oht of house no utis , no hemajria or dhsuria  weigh tstable  to relocate to [place] new  in October lasturine explaied - 3 red cells in settin of epith cells  here for f/u urne :

## 2023-08-28 LAB
APPEARANCE: CLEAR
BACTERIA: NEGATIVE /HPF
BILIRUBIN URINE: NEGATIVE
BLOOD URINE: ABNORMAL
CAST: 2 /LPF
COLOR: YELLOW
EPITHELIAL CELLS: 3 /HPF
GLUCOSE QUALITATIVE U: NEGATIVE MG/DL
KETONES URINE: NEGATIVE MG/DL
LEUKOCYTE ESTERASE URINE: NEGATIVE
MICROSCOPIC-UA: NORMAL
NITRITE URINE: NEGATIVE
PH URINE: 6
PROTEIN URINE: NEGATIVE MG/DL
RED BLOOD CELLS URINE: 46 /HPF
REVIEW: NORMAL
SPECIFIC GRAVITY URINE: 1.02
UROBILINOGEN URINE: 0.2 MG/DL
WHITE BLOOD CELLS URINE: 3 /HPF

## 2023-08-29 DIAGNOSIS — R31.29 OTHER MICROSCOPIC HEMATURIA: ICD-10-CM

## 2023-09-01 ENCOUNTER — APPOINTMENT (OUTPATIENT)
Dept: UROLOGY | Facility: CLINIC | Age: 63
End: 2023-09-01
Payer: COMMERCIAL

## 2023-09-01 DIAGNOSIS — R31.29 OTHER MICROSCOPIC HEMATURIA: ICD-10-CM

## 2023-09-01 PROCEDURE — 52000 CYSTOURETHROSCOPY: CPT

## 2023-09-07 ENCOUNTER — APPOINTMENT (OUTPATIENT)
Dept: NEUROLOGY | Facility: CLINIC | Age: 63
End: 2023-09-07
Payer: COMMERCIAL

## 2023-09-07 VITALS
TEMPERATURE: 99 F | WEIGHT: 163 LBS | OXYGEN SATURATION: 98 % | SYSTOLIC BLOOD PRESSURE: 120 MMHG | HEART RATE: 91 BPM | HEIGHT: 67 IN | DIASTOLIC BLOOD PRESSURE: 72 MMHG | BODY MASS INDEX: 25.58 KG/M2

## 2023-09-07 DIAGNOSIS — R20.2 ANESTHESIA OF SKIN: ICD-10-CM

## 2023-09-07 DIAGNOSIS — M54.50 LOW BACK PAIN, UNSPECIFIED: ICD-10-CM

## 2023-09-07 DIAGNOSIS — G89.29 LOW BACK PAIN, UNSPECIFIED: ICD-10-CM

## 2023-09-07 DIAGNOSIS — M51.36 OTHER INTERVERTEBRAL DISC DEGENERATION, LUMBAR REGION: ICD-10-CM

## 2023-09-07 DIAGNOSIS — R20.0 ANESTHESIA OF SKIN: ICD-10-CM

## 2023-09-07 DIAGNOSIS — M54.16 RADICULOPATHY, LUMBAR REGION: ICD-10-CM

## 2023-09-07 PROCEDURE — 99205 OFFICE O/P NEW HI 60 MIN: CPT

## 2023-09-07 RX ORDER — GABAPENTIN 300 MG/1
300 CAPSULE ORAL 3 TIMES DAILY
Qty: 90 | Refills: 5 | Status: ACTIVE | COMMUNITY
Start: 2023-09-07 | End: 1900-01-01

## 2023-09-07 NOTE — ASSESSMENT
[FreeTextEntry1] : Patient complains of symptoms concerning with a lumbar radiculopathy on the left side, her MRI of the lumbar spine is notable to for L4/5 and L5/S1 disk bulgesand and ncs/emg 2022: chronic left L5-S1 radiculopathy ,  ncs/emg mixed sensory neuropathy (2021).  Advised patients on the importance of physical therapy, will refer, she could go to physical therapy followed once per month for more frequent basis for assessment and to order recommended PT exercises at home.  We will start Neurontin 300 mg at bedtime and titrate to 300 mg 3 times per day.  We will obtain repeat nerve conduction EMG of the legs to evaluate for neuropathy as the patient does not have symptoms consistent with neuropathy.  antalgic and spastic due to old cva  I spent the time noted on the day of this patient encounter preparing for, providing and documenting the above E/M service and counseling and educate patient on differential, workup, disease course, and treatment/management. Education was provided to the patient during this encounter. All questions and concerns were answered and addressed in detail. The patient verbalized understanding and agreed to plan. Patient was advised to continue to monitor for neurologic symptoms and to notify my office or go to the nearest emergency room if there are any changes. Any orders/referrals and communications were provided as well.   Side effects of the above medications were discussed in detail including but not limited to applicable black box warning and teratogenicity as appropriate.  For patients with seizures, I discussed risk of SUDEP with patient and advised that SUDEP risk can be minimized with good seizure control through medication compliance and other measures. Patient was advised to bring previous records to my office, including CD of imaging, when applicable.

## 2023-09-07 NOTE — DATA REVIEWED
[de-identified] : normal [de-identified] : ncs/emg mixed sensory neuropathy (2021) ncs/emg 2022: chronic left L5-S1 radiculopathy  MRI LS spine notable to for L4/5 and L5/S1 disk bulges

## 2023-09-07 NOTE — HISTORY OF PRESENT ILLNESS
[FreeTextEntry1] : The patient has prior stroke and is here for back pain radiating to the left leg starting in 2021.  The patient has seen previously neurologist and has had an MRI of the lumbar spine as well as EMGs of the legs.  She has done physical therapy for a few months with improvement of the symptoms but stopped it due to cost.  She is unable to do physical therapy on an ongoing basis but is able to do it on a few times per month basis.  She was previously given Lyrica for her pain but has not taken it due to concern of side effects.  Pain is worse with specific movements.  The patient also has knee pain.

## 2023-09-07 NOTE — PHYSICAL EXAM
[General Appearance - Alert] : alert [General Appearance - In No Acute Distress] : in no acute distress [Motor Tone] : muscle tone was normal in all four extremities [Motor Strength] : muscle strength was normal in all four extremities [Sensation Tactile Decrease] : light touch was intact [1+] : Ankle jerk left 1+ [FreeTextEntry8] : antalgic and spastic due to old cva

## 2023-09-11 ENCOUNTER — OUTPATIENT (OUTPATIENT)
Dept: OUTPATIENT SERVICES | Facility: HOSPITAL | Age: 63
LOS: 1 days | End: 2023-09-11
Payer: COMMERCIAL

## 2023-09-11 ENCOUNTER — APPOINTMENT (OUTPATIENT)
Dept: CT IMAGING | Facility: IMAGING CENTER | Age: 63
End: 2023-09-11
Payer: COMMERCIAL

## 2023-09-11 DIAGNOSIS — Z98.891 HISTORY OF UTERINE SCAR FROM PREVIOUS SURGERY: Chronic | ICD-10-CM

## 2023-09-11 DIAGNOSIS — Z98.49 CATARACT EXTRACTION STATUS, UNSPECIFIED EYE: Chronic | ICD-10-CM

## 2023-09-11 DIAGNOSIS — Z98.890 OTHER SPECIFIED POSTPROCEDURAL STATES: Chronic | ICD-10-CM

## 2023-09-11 DIAGNOSIS — R31.29 OTHER MICROSCOPIC HEMATURIA: ICD-10-CM

## 2023-09-11 DIAGNOSIS — Z98.51 TUBAL LIGATION STATUS: Chronic | ICD-10-CM

## 2023-09-11 PROCEDURE — 74178 CT ABD&PLV WO CNTR FLWD CNTR: CPT

## 2023-09-11 PROCEDURE — 74178 CT ABD&PLV WO CNTR FLWD CNTR: CPT | Mod: 26

## 2023-09-19 ENCOUNTER — APPOINTMENT (OUTPATIENT)
Dept: UROLOGY | Facility: CLINIC | Age: 63
End: 2023-09-19
Payer: COMMERCIAL

## 2023-09-19 DIAGNOSIS — N32.9 BLADDER DISORDER, UNSPECIFIED: ICD-10-CM

## 2023-09-19 DIAGNOSIS — R35.0 FREQUENCY OF MICTURITION: ICD-10-CM

## 2023-09-19 PROCEDURE — 52224Z: CUSTOM

## 2023-09-21 ENCOUNTER — APPOINTMENT (OUTPATIENT)
Dept: NEUROLOGY | Facility: CLINIC | Age: 63
End: 2023-09-21

## 2023-09-22 LAB — CORE LAB BIOPSY: NORMAL

## 2024-01-19 ENCOUNTER — APPOINTMENT (OUTPATIENT)
Dept: NEUROLOGY | Facility: CLINIC | Age: 64
End: 2024-01-19

## 2024-02-09 NOTE — PROGRESS NOTE ADULT - PROBLEM/PLAN-1
DISPLAY PLAN FREE TEXT [facility] DIVISION OF KIDNEY DISEASES AND HYPERTENSION -- FOLLOW UP NOTE  MARISELA Fellow pager # [phone]  Nephrology office # [phone]  Available on [employer] teams--> Rozina Tam  -----------------------------------------------------------------------------  Chief Complaint:/subjective:    seen this am. febrile and tachypnea intubated since then     Mode: AC/ CMV (Assist Control/ Continuous Mandatory Ventilation)  RR (machine): 16  TV (machine): 350  FiO2: 70  PEEP: 5  ITime: 0.62  MAP: 9  PIP: 22    24 hour events:  Mode: AC/ CMV (Assist Control/ Continuous Mandatory Ventilation)  RR (machine): 16  TV (machine): 350  FiO2: 70  PEEP: 5  ITime: 0.62  MAP: 9  PIP: 22            ALLERGIES & MEDICATIONS  --------------------------------------------------------------------------------  Allergies    Pineapple (Unknown)  contrast dye -  rash (Other)  iodine (Other)  codeine (Vomiting)  penicillin (Rash)  latex (Rash)    Intolerances    Tolerates ceftriaxone, cefepime (Other)    Standing Inpatient Medications  cefTRIAXone   IVPB      cefTRIAXone   IVPB 1000 milliGRAM(s) IV Intermittent every 24 hours  chlorhexidine 0.12% Liquid 15 milliLiter(s) Oral Mucosa every 12 hours  Dakins Solution - 1/4 Strength 1 Application(s) Topical daily  dextrose 5%. 1000 milliLiter(s) IV Continuous <Continuous>  dextrose 5%. 1000 milliLiter(s) IV Continuous <Continuous>  dextrose 5%. 1000 milliLiter(s) IV Continuous <Continuous>  dextrose 5%. 1000 milliLiter(s) IV Continuous <Continuous>  dextrose 50% Injectable 25 Gram(s) IV Push once  dextrose 50% Injectable 25 Gram(s) IV Push once  dextrose 50% Injectable 25 Gram(s) IV Push once  dextrose 50% Injectable 25 Gram(s) IV Push once  dextrose 50% Injectable 12.5 Gram(s) IV Push once  dextrose 50% Injectable 12.5 Gram(s) IV Push once  doxycycline IVPB      doxycycline IVPB 100 milliGRAM(s) IV Intermittent every 12 hours  glucagon  Injectable 1 milliGRAM(s) IntraMuscular once  glucagon  Injectable 1 milliGRAM(s) IntraMuscular once  heparin   Injectable 5000 Unit(s) SubCutaneous every 12 hours  insulin lispro (ADMELOG) corrective regimen sliding scale   SubCutaneous every 6 hours  metoprolol tartrate 25 milliGRAM(s) Oral two times a day  metroNIDAZOLE  IVPB 500 milliGRAM(s) IV Intermittent every 12 hours  pantoprazole   Suspension 40 milliGRAM(s) Oral daily  sodium bicarbonate 1300 milliGRAM(s) Oral three times a day  sodium chloride 0.45% 1000 milliLiter(s) IV Continuous <Continuous>  sodium zirconium cyclosilicate 10 Gram(s) Oral daily  sterile water 1000 milliLiter(s) IV Continuous <Continuous>    PRN Inpatient Medications  acetaminophen   Oral Liquid .. 650 milliGRAM(s) Oral every 6 hours PRN  dextrose Oral Gel 15 Gram(s) Oral once PRN  dextrose Oral Gel 15 Gram(s) Oral once PRN      REVIEW OF SYSTEMS  --------------------------------------------------------------------------------    unable     VITALS/PHYSICAL EXAM  --------------------------------------------------------------------------------  T(C): 38.4 (02-09-24 @ 05:55), Max: 38.9 (02-09-24 @ 04:00)  HR: 95 (02-09-24 @ 10:08) (91 - 116)  BP: 117/41 (02-09-24 @ 05:55) (98/43 - 146/62)  RR: 20 (02-09-24 @ 05:55) (18 - 24)  SpO2: 100% (02-09-24 @ 10:08) (91% - 100%)  Wt(kg): --        02-08-24 @ 07:01  -  02-09-24 @ 07:00  --------------------------------------------------------  IN: 0 mL / OUT: 0 mL / NET: 0 mL      Physical Exam:  	Gen ill appearing  	HEENT: no JVD  	Pulm: CTABL  	CV: S1S2,  	Abd: Soft,   	Ext:  ++ edema B/L LE   	Neuro: obtunded   	Skin: Warm and dry               LABS/STUDIES  --------------------------------------------------------------------------------              7.6    12.54 >-----------<  104      [02-09-24 @ 04:55]              23.3     Hemoglobin: 7.6 g/dL (02-09-24 @ 04:55)  Hemoglobin: 7.8 g/dL (02-08-24 @ 00:07)    Platelet Count - Automated: 104 K/uL (02-09-24 @ 04:55)  Platelet Count - Automated: 92 K/uL (02-08-24 @ 00:07)    140  |  104  |  110  ----------------------------<  235      [02-09-24 @ 04:55]  5.2   |  17  |  3.60        Ca     8.5     [02-09-24 @ 04:55]      Mg     2.20     [02-09-24 @ 04:55]      Phos  4.3     [02-09-24 @ 04:55]            Creatinine: 3.60 mg/dL (02-09-24 @ 04:55)  Creatinine: 3.33 mg/dL (02-08-24 @ 00:07)  Creatinine: 3.35 mg/dL (02-07-24 @ 03:40)  Creatinine: 3.26 mg/dL (02-06-24 @ 18:25)  Creatinine: 3.03 mg/dL (02-06-24 @ 03:30)  Creatinine: 2.78 mg/dL (02-05-24 @ 04:00)  Creatinine: 2.50 mg/dL (02-04-24 @ 11:00)  Creatinine: 2.25 mg/dL (02-03-24 @ 16:12)  Creatinine: 2.05 mg/dL (02-02-24 @ 22:14)  Creatinine: 1.91 mg/dL (02-02-24 @ 08:50)  Creatinine: 1.97 mg/dL (02-01-24 @ 04:49)  Creatinine: 2.02 mg/dL (01-31-24 @ 05:45)    SODIUM TREND:  Sodium 140 [02-09 @ 04:55]  Sodium 138 [02-08 @ 00:07]  Sodium 137 [02-07 @ 03:40]  Sodium 138 [02-06 @ 18:25]  Sodium 138 [02-06 @ 03:30]  Sodium 134 [02-05 @ 04:00]  Sodium 135 [02-04 @ 11:00]  Sodium 133 [02-03 @ 16:12]  Sodium 133 [02-02 @ 22:14]  Sodium 133 [02-02 @ 08:50]    ABG - ( 09 Feb 2024 04:55 )  pH, Arterial: 7.42  pH, Blood: x     /  pCO2: 28    /  pO2: 172   / HCO3: 18    / Base Excess: -5.5  /  SaO2: 99.0              Mode: AC/ CMV (Assist Control/ Continuous Mandatory Ventilation)  RR (machine): 16  TV (machine): 350  FiO2: 70  PEEP: 5  ITime: 0.62  MAP: 9  PIP: 22    SCr 3.60 [02-09 @ 04:55]  SCr 3.33 [02-08 @ 00:07]  SCr 3.35 [02-07 @ 03:40]  SCr 3.26 [02-06 @ 18:25]  SCr 3.03 [02-06 @ 03:30]    Urinalysis - [02-09-24 @ 04:55]      Color  / Appearance  / SG  / pH       Gluc 235 / Ketone   / Bili  / Urobili        Blood  / Protein  / Leuk Est  / Nitrite       RBC  / WBC  / Hyaline  / Gran  / Sq Epi  / Non Sq Epi  / Bacteria       Iron 45, TIBC 211, %sat 22      [12-16-23 @ 07:30]  Ferritin 365      [12-16-23 @ 07:30]  TSH 1.460      [12-21-23 @ 05:50]

## 2024-05-15 NOTE — PATIENT PROFILE ADULT - NSASFUNCLEVELADLTOILET_GEN_A_NUR
bilateral upper extremity Passive ROM was WFL (within functional limits)/bilateral lower extremity Passive ROM was WFL (within functional limits) 0 = independent

## 2025-03-07 NOTE — PHYSICAL THERAPY INITIAL EVALUATION ADULT - DIAGNOSIS, PT EVAL
well developed, well nourished , in no acute distress , ambulating without difficulty , normal communication ability Impaired functional mobility

## 2025-06-09 NOTE — ED ADULT NURSE NOTE - NSFALLRSKOUTCOME_ED_ALL_ED
Last seen 5/23/2025   Last labs   Last filled  4/4/24  Next appointment 7/3/2025     Lab Results   Component Value Date     04/21/2025    K 4.6 04/21/2025     (H) 04/21/2025    CO2 23 04/21/2025    BUN 26 04/21/2025    CREATININE 1.18 04/21/2025    GLUCOSE 141 (H) 04/21/2025    CALCIUM 9.7 04/21/2025    BILITOT <0.2 04/21/2025    ALKPHOS 103 04/21/2025    AST 23 04/21/2025    ALT 23 04/21/2025    LABGLOM 66 04/21/2025    GFRAA >60.0 10/11/2021    AGRATIO 1.1 10/11/2021    GLOB 3.9 10/11/2021        
Universal Safety Interventions